# Patient Record
Sex: MALE | Race: WHITE | Employment: OTHER | URBAN - METROPOLITAN AREA
[De-identification: names, ages, dates, MRNs, and addresses within clinical notes are randomized per-mention and may not be internally consistent; named-entity substitution may affect disease eponyms.]

---

## 2018-08-28 ENCOUNTER — APPOINTMENT (OUTPATIENT)
Dept: GENERAL RADIOLOGY | Age: 68
End: 2018-08-28
Payer: MEDICARE

## 2018-08-28 ENCOUNTER — APPOINTMENT (OUTPATIENT)
Dept: CT IMAGING | Age: 68
End: 2018-08-28
Payer: MEDICARE

## 2018-08-28 ENCOUNTER — HOSPITAL ENCOUNTER (EMERGENCY)
Age: 68
Discharge: HOME | End: 2018-08-28
Attending: EMERGENCY MEDICINE
Payer: MEDICARE

## 2018-08-28 VITALS
HEART RATE: 74 BPM | BODY MASS INDEX: 26.6 KG/M2 | OXYGEN SATURATION: 97 % | TEMPERATURE: 98.1 F | WEIGHT: 190 LBS | HEIGHT: 71 IN | RESPIRATION RATE: 16 BRPM | DIASTOLIC BLOOD PRESSURE: 78 MMHG | SYSTOLIC BLOOD PRESSURE: 148 MMHG

## 2018-08-28 DIAGNOSIS — F12.10 MARIJUANA ABUSE: ICD-10-CM

## 2018-08-28 DIAGNOSIS — R42 DIZZINESS AND GIDDINESS: Primary | ICD-10-CM

## 2018-08-28 LAB
ACETAMINOPHEN LEVEL: <5 MCG/ML (ref 10–30)
ALBUMIN SERPL-MCNC: 4.7 G/DL (ref 3.5–5.2)
ALP BLD-CCNC: 122 U/L (ref 40–129)
ALT SERPL-CCNC: 11 U/L (ref 0–40)
AMPHETAMINE SCREEN, URINE: NOT DETECTED
ANION GAP SERPL CALCULATED.3IONS-SCNC: 18 MMOL/L (ref 7–16)
APTT: 31.5 SEC (ref 24.5–35.1)
AST SERPL-CCNC: 14 U/L (ref 0–39)
B.E.: 0.1 MMOL/L (ref -3–3)
BACTERIA: ABNORMAL /HPF
BARBITURATE SCREEN URINE: NOT DETECTED
BASOPHILS ABSOLUTE: 0.09 E9/L (ref 0–0.2)
BASOPHILS RELATIVE PERCENT: 0.9 % (ref 0–2)
BENZODIAZEPINE SCREEN, URINE: NOT DETECTED
BILIRUB SERPL-MCNC: 0.6 MG/DL (ref 0–1.2)
BILIRUBIN URINE: NEGATIVE
BLOOD, URINE: ABNORMAL
BUN BLDV-MCNC: 11 MG/DL (ref 8–23)
CALCIUM SERPL-MCNC: 9.5 MG/DL (ref 8.6–10.2)
CANNABINOID SCREEN URINE: POSITIVE
CHLORIDE BLD-SCNC: 100 MMOL/L (ref 98–107)
CHP ED QC CHECK: YES
CLARITY: CLEAR
CO2: 22 MMOL/L (ref 22–29)
COCAINE METABOLITE SCREEN URINE: NOT DETECTED
COHB: 2.1 % (ref 0–1.5)
COLOR: YELLOW
CREAT SERPL-MCNC: 0.9 MG/DL (ref 0.7–1.2)
CRITICAL: ABNORMAL
DATE ANALYZED: ABNORMAL
DATE OF COLLECTION: ABNORMAL
EKG ATRIAL RATE: 127 BPM
EKG P AXIS: 70 DEGREES
EKG P-R INTERVAL: 162 MS
EKG Q-T INTERVAL: 308 MS
EKG QRS DURATION: 96 MS
EKG QTC CALCULATION (BAZETT): 447 MS
EKG R AXIS: -88 DEGREES
EKG T AXIS: 65 DEGREES
EKG VENTRICULAR RATE: 127 BPM
EOSINOPHILS ABSOLUTE: 0.37 E9/L (ref 0.05–0.5)
EOSINOPHILS RELATIVE PERCENT: 3.6 % (ref 0–6)
ETHANOL: <10 MG/DL (ref 0–0.08)
GFR AFRICAN AMERICAN: >60
GFR NON-AFRICAN AMERICAN: >60 ML/MIN/1.73
GLUCOSE BLD-MCNC: 171 MG/DL
GLUCOSE BLD-MCNC: 174 MG/DL (ref 74–109)
GLUCOSE URINE: NEGATIVE MG/DL
HCO3: 21.5 MMOL/L (ref 22–26)
HCT VFR BLD CALC: 48.6 % (ref 37–54)
HEMOGLOBIN: 16.7 G/DL (ref 12.5–16.5)
HHB: 1.5 % (ref 0–5)
IMMATURE GRANULOCYTES #: 0.06 E9/L
IMMATURE GRANULOCYTES %: 0.6 % (ref 0–5)
INR BLD: 1
KETONES, URINE: NEGATIVE MG/DL
LAB: ABNORMAL
LACTIC ACID: 2.6 MMOL/L (ref 0.5–2.2)
LEUKOCYTE ESTERASE, URINE: ABNORMAL
LYMPHOCYTES ABSOLUTE: 2.59 E9/L (ref 1.5–4)
LYMPHOCYTES RELATIVE PERCENT: 25.4 % (ref 20–42)
Lab: 845
MCH RBC QN AUTO: 31.3 PG (ref 26–35)
MCHC RBC AUTO-ENTMCNC: 34.4 % (ref 32–34.5)
MCV RBC AUTO: 91 FL (ref 80–99.9)
METER GLUCOSE: 171 MG/DL (ref 70–110)
METHADONE SCREEN, URINE: NOT DETECTED
METHB: 0.2 % (ref 0–1.5)
MODE: ABNORMAL
MONOCYTES ABSOLUTE: 0.88 E9/L (ref 0.1–0.95)
MONOCYTES RELATIVE PERCENT: 8.6 % (ref 2–12)
NEUTROPHILS ABSOLUTE: 6.21 E9/L (ref 1.8–7.3)
NEUTROPHILS RELATIVE PERCENT: 60.9 % (ref 43–80)
NITRITE, URINE: NEGATIVE
O2 CONTENT: 22.7 ML/DL
O2 SATURATION: 98.5 % (ref 92–98.5)
O2HB: 96.2 % (ref 94–97)
OPERATOR ID: ABNORMAL
OPIATE SCREEN URINE: NOT DETECTED
PATIENT TEMP: 37 C
PCO2: 27.5 MMHG (ref 35–45)
PDW BLD-RTO: 14.3 FL (ref 11.5–15)
PH BLOOD GAS: 7.51 (ref 7.35–7.45)
PH UA: 6 (ref 5–9)
PHENCYCLIDINE SCREEN URINE: NOT DETECTED
PLATELET # BLD: 348 E9/L (ref 130–450)
PMV BLD AUTO: 10.7 FL (ref 7–12)
PO2: 128.8 MMHG (ref 60–100)
POTASSIUM SERPL-SCNC: 3.4 MMOL/L (ref 3.5–5)
PROPOXYPHENE SCREEN: NOT DETECTED
PROTEIN UA: NEGATIVE MG/DL
PROTHROMBIN TIME: 11.1 SEC (ref 9.3–12.4)
RBC # BLD: 5.34 E12/L (ref 3.8–5.8)
RBC UA: ABNORMAL /HPF (ref 0–2)
SALICYLATE, SERUM: <0.3 MG/DL (ref 0–30)
SODIUM BLD-SCNC: 140 MMOL/L (ref 132–146)
SOURCE, BLOOD GAS: ABNORMAL
SPECIFIC GRAVITY UA: <=1.005 (ref 1–1.03)
THB: 16.7 G/DL (ref 11.5–16.5)
TIME ANALYZED: 854
TOTAL PROTEIN: 8.2 G/DL (ref 6.4–8.3)
TRICYCLIC ANTIDEPRESSANTS SCREEN SERUM: NEGATIVE NG/ML
TROPONIN: <0.01 NG/ML (ref 0–0.03)
UROBILINOGEN, URINE: 0.2 E.U./DL
WBC # BLD: 10.2 E9/L (ref 4.5–11.5)
WBC UA: ABNORMAL /HPF (ref 0–5)

## 2018-08-28 PROCEDURE — 73080 X-RAY EXAM OF ELBOW: CPT

## 2018-08-28 PROCEDURE — 82962 GLUCOSE BLOOD TEST: CPT

## 2018-08-28 PROCEDURE — 6370000000 HC RX 637 (ALT 250 FOR IP): Performed by: EMERGENCY MEDICINE

## 2018-08-28 PROCEDURE — 2580000003 HC RX 258: Performed by: EMERGENCY MEDICINE

## 2018-08-28 PROCEDURE — 36415 COLL VENOUS BLD VENIPUNCTURE: CPT

## 2018-08-28 PROCEDURE — 84484 ASSAY OF TROPONIN QUANT: CPT

## 2018-08-28 PROCEDURE — 71045 X-RAY EXAM CHEST 1 VIEW: CPT

## 2018-08-28 PROCEDURE — 83605 ASSAY OF LACTIC ACID: CPT

## 2018-08-28 PROCEDURE — 81001 URINALYSIS AUTO W/SCOPE: CPT

## 2018-08-28 PROCEDURE — G0480 DRUG TEST DEF 1-7 CLASSES: HCPCS

## 2018-08-28 PROCEDURE — 82805 BLOOD GASES W/O2 SATURATION: CPT

## 2018-08-28 PROCEDURE — 85730 THROMBOPLASTIN TIME PARTIAL: CPT

## 2018-08-28 PROCEDURE — 80053 COMPREHEN METABOLIC PANEL: CPT

## 2018-08-28 PROCEDURE — 96365 THER/PROPH/DIAG IV INF INIT: CPT

## 2018-08-28 PROCEDURE — 70450 CT HEAD/BRAIN W/O DYE: CPT

## 2018-08-28 PROCEDURE — 87040 BLOOD CULTURE FOR BACTERIA: CPT

## 2018-08-28 PROCEDURE — 6360000002 HC RX W HCPCS: Performed by: EMERGENCY MEDICINE

## 2018-08-28 PROCEDURE — 85025 COMPLETE CBC W/AUTO DIFF WBC: CPT

## 2018-08-28 PROCEDURE — 99285 EMERGENCY DEPT VISIT HI MDM: CPT

## 2018-08-28 PROCEDURE — 93005 ELECTROCARDIOGRAM TRACING: CPT | Performed by: EMERGENCY MEDICINE

## 2018-08-28 PROCEDURE — 80307 DRUG TEST PRSMV CHEM ANLYZR: CPT

## 2018-08-28 PROCEDURE — 96366 THER/PROPH/DIAG IV INF ADDON: CPT

## 2018-08-28 PROCEDURE — 85610 PROTHROMBIN TIME: CPT

## 2018-08-28 RX ORDER — SODIUM CHLORIDE 0.9 % (FLUSH) 0.9 %
10 SYRINGE (ML) INJECTION EVERY 12 HOURS SCHEDULED
Status: DISCONTINUED | OUTPATIENT
Start: 2018-08-28 | End: 2018-08-28 | Stop reason: HOSPADM

## 2018-08-28 RX ORDER — DIAZEPAM 5 MG/1
5 TABLET ORAL EVERY 6 HOURS PRN
Qty: 16 TABLET | Refills: 0 | Status: ON HOLD | OUTPATIENT
Start: 2018-08-28 | End: 2018-08-30

## 2018-08-28 RX ORDER — DIAZEPAM 5 MG/1
10 TABLET ORAL ONCE
Status: COMPLETED | OUTPATIENT
Start: 2018-08-28 | End: 2018-08-28

## 2018-08-28 RX ORDER — VENLAFAXINE HYDROCHLORIDE 150 MG/1
150 CAPSULE, EXTENDED RELEASE ORAL DAILY
COMMUNITY
End: 2018-08-30 | Stop reason: SDUPTHER

## 2018-08-28 RX ORDER — ATORVASTATIN CALCIUM 10 MG/1
10 TABLET, FILM COATED ORAL DAILY
COMMUNITY
End: 2018-08-30 | Stop reason: SDUPTHER

## 2018-08-28 RX ORDER — AMLODIPINE BESYLATE 10 MG/1
10 TABLET ORAL DAILY
COMMUNITY
End: 2018-08-30 | Stop reason: SDUPTHER

## 2018-08-28 RX ORDER — OMEPRAZOLE 20 MG/1
20 CAPSULE, DELAYED RELEASE ORAL 2 TIMES DAILY
COMMUNITY
End: 2018-08-30 | Stop reason: SDUPTHER

## 2018-08-28 RX ORDER — POTASSIUM CHLORIDE 7.45 MG/ML
10 INJECTION INTRAVENOUS ONCE
Status: COMPLETED | OUTPATIENT
Start: 2018-08-28 | End: 2018-08-28

## 2018-08-28 RX ORDER — POLYETHYLENE GLYCOL 3350 17 G/17G
17 POWDER, FOR SOLUTION ORAL DAILY PRN
COMMUNITY
End: 2018-08-30

## 2018-08-28 RX ORDER — TAMSULOSIN HYDROCHLORIDE 0.4 MG/1
0.4 CAPSULE ORAL NIGHTLY
COMMUNITY
End: 2018-08-30 | Stop reason: SDUPTHER

## 2018-08-28 RX ORDER — SODIUM CHLORIDE 0.9 % (FLUSH) 0.9 %
10 SYRINGE (ML) INJECTION PRN
Status: DISCONTINUED | OUTPATIENT
Start: 2018-08-28 | End: 2018-08-28 | Stop reason: HOSPADM

## 2018-08-28 RX ADMIN — DIAZEPAM 10 MG: 5 TABLET ORAL at 12:22

## 2018-08-28 RX ADMIN — POTASSIUM CHLORIDE 10 MEQ: 10 INJECTION, SOLUTION INTRAVENOUS at 10:25

## 2018-08-28 RX ADMIN — Medication 10 ML: at 12:23

## 2018-08-28 ASSESSMENT — ENCOUNTER SYMPTOMS
EYE REDNESS: 0
SHORTNESS OF BREATH: 0
DIARRHEA: 0
SINUS PRESSURE: 0
WHEEZING: 0
VOMITING: 0
COUGH: 0
ABDOMINAL PAIN: 0
EYE DISCHARGE: 0
NAUSEA: 0
EYE PAIN: 0
BLOOD IN STOOL: 0
SORE THROAT: 0
BACK PAIN: 0

## 2018-08-28 ASSESSMENT — PAIN SCALES - GENERAL: PAINLEVEL_OUTOF10: 10

## 2018-08-28 ASSESSMENT — PAIN DESCRIPTION - LOCATION: LOCATION: HEAD

## 2018-08-28 ASSESSMENT — PAIN DESCRIPTION - PAIN TYPE: TYPE: ACUTE PAIN

## 2018-08-28 NOTE — ED PROVIDER NOTES
Normal rate, regular rhythm and normal heart sounds. Pulmonary/Chest: Effort normal and breath sounds normal. No respiratory distress. He has no wheezes. Abdominal: Soft. Bowel sounds are normal. He exhibits no distension. There is no tenderness. There is no rebound. Neurological: He is alert and oriented to person, place, and time. He has normal strength. No cranial nerve deficit or sensory deficit. He exhibits normal muscle tone. Coordination normal.   Patient is answering questions appropriately and responding with appropriate activity to questions however is very delayed and having trouble immediately responding to those commands. Skin: He is not diaphoretic. Procedures    MDM  --------------------------------------------- PAST HISTORY ---------------------------------------------  Past Medical History:  has a past medical history of Cerebral artery occlusion with cerebral infarction ([address] [address] Depression; Hyperlipidemia; and Hypertension. Past Surgical History:  has no past surgical history on file. Social History:  reports that he has never smoked. He has never used smokeless tobacco. He reports that he uses drugs, including Marijuana, about 7 times per week. He reports that he does not drink alcohol. Family History: family history is not on file. The patients home medications have been reviewed.     Allergies: Penicillins    -------------------------------------------------- RESULTS -------------------------------------------------  Labs:  Results for orders placed or performed during the hospital encounter of 08/28/18   CBC Auto Differential   Result Value Ref Range    WBC 10.2 4.5 - 11.5 E9/L    RBC 5.34 3.80 - 5.80 E12/L    Hemoglobin 16.7 (H) 12.5 - 16.5 g/dL    Hematocrit 48.6 37.0 - 54.0 %    MCV 91.0 80.0 - 99.9 fL    MCH 31.3 26.0 - 35.0 pg    MCHC 34.4 32.0 - 34.5 %    RDW 14.3 11.5 - 15.0 fL    Platelets 608 047 - 901 E9/L    MPV 10.7 7.0 - 12.0 fL    Neutrophils % 60.9 43.0 - 80.0 %    Immature Granulocytes % 0.6 0.0 - 5.0 %    Lymphocytes % 25.4 20.0 - 42.0 %    Monocytes % 8.6 2.0 - 12.0 %    Eosinophils % 3.6 0.0 - 6.0 %    Basophils % 0.9 0.0 - 2.0 %    Neutrophils # 6.21 1.80 - 7.30 E9/L    Immature Granulocytes # 0.06 E9/L    Lymphocytes # 2.59 1.50 - 4.00 E9/L    Monocytes # 0.88 0.10 - 0.95 E9/L    Eosinophils # 0.37 0.05 - 0.50 E9/L    Basophils # 0.09 0.00 - 0.20 E9/L   Comprehensive Metabolic Panel   Result Value Ref Range    Sodium 140 132 - 146 mmol/L    Potassium 3.4 (L) 3.5 - 5.0 mmol/L    Chloride 100 98 - 107 mmol/L    CO2 22 22 - 29 mmol/L    Anion Gap 18 (H) 7 - 16 mmol/L    Glucose 174 (H) 74 - 109 mg/dL    BUN 11 8 - 23 mg/dL    CREATININE 0.9 0.7 - 1.2 mg/dL    GFR Non-African American >60 >=60 mL/min/1.73    GFR African American >60     Calcium 9.5 8.6 - 10.2 mg/dL    Total Protein 8.2 6.4 - 8.3 g/dL    Alb 4.7 3.5 - 5.2 g/dL    Total Bilirubin 0.6 0.0 - 1.2 mg/dL    Alkaline Phosphatase 122 40 - 129 U/L    ALT 11 0 - 40 U/L    AST 14 0 - 39 U/L   Troponin   Result Value Ref Range    Troponin <0.01 0.00 - 0.03 ng/mL   Lactic Acid, Plasma   Result Value Ref Range    Lactic Acid 2.6 (H) 0.5 - 2.2 mmol/L   Urinalysis   Result Value Ref Range    Color, UA Yellow Straw/Yellow    Clarity, UA Clear Clear    Glucose, Ur Negative Negative mg/dL    Bilirubin Urine Negative Negative    Ketones, Urine Negative Negative mg/dL    Specific Gravity, UA <=1.005 1.005 - 1.030    Blood, Urine TRACE-INTACT Negative    pH, UA 6.0 5.0 - 9.0    Protein, UA Negative Negative mg/dL    Urobilinogen, Urine 0.2 <2.0 E.U./dL    Nitrite, Urine Negative Negative    Leukocyte Esterase, Urine TRACE (A) Negative   Urine Drug Screen   Result Value Ref Range    Amphetamine Screen, Urine NOT DETECTED Negative <1000 ng/mL    Barbiturate Screen, Ur NOT DETECTED Negative < 200 ng/mL    Benzodiazepine Screen, Urine NOT DETECTED Negative < 200 ng/mL    Cannabinoid Scrn, Ur POSITIVE (A) Negative < 50ng/mL No gross focal consolidation. CT Head WO Contrast   Final Result   1. No evidence of intracranial hemorrhage, extra axial collection,   space-occupying mass lesion or mass effect, midline shift, or acute   territorial infarction. If there is strong clinical suspicion for   acute infarct of the brain, then MR imaging of the brain with   diffusion-weighted sequence may be obtained for further evaluation. 2. Chronic involutional changes and minimal microvascular ischemic   disease as described above.          ------------------------- NURSING NOTES AND VITALS REVIEWED ---------------------------  Date / Time Roomed:  8/28/2018  8:34 AM  ED Bed Assignment:  21/21    The nursing notes within the ED encounter and vital signs as below have been reviewed. BP (!) 164/89   Pulse 70   Temp 98.1 °F (36.7 °C) (Oral)   Resp 18   Ht 5' 11\" (1.803 m)   Wt 190 lb (86.2 kg)   SpO2 97%   BMI 26.50 kg/m²   Oxygen Saturation Interpretation: Normal      ------------------------------------------ PROGRESS NOTES ------------------------------------------  Patient seen and examined the patient does appear altered in his mental status he is complaining of dizziness he is answering questions appropriately but is very delayed in his responses to my commands. Labs and imaging for concern for stroke or brain bleed are ordered neurologically EKG chest x-ray and other drug screens have been ordered as well. Imaging and labs are reviewed because we have not found a clear source for this patient's altered mental status I have suggested a lumbar puncture to the patient. Patient is much more alert and oriented this time is answering all questions appropriately and states he does not want to have a lumbar puncture performed. Patient is complaining of significant dizziness at this time I've given the patient a 10 mg value to see how he does.     Patient reevaluated and is able to ambulate without any difficulty he is given shows signs of improvement of his mental status at this point I feel comfortable discharging the patient. I have discussed the patient's use of marijuana may have some implications in his symptoms today. He does state that he goes his own marijuana and homes is not using any synthetics or other disorders for his marijuana. He asked that he follow up with primary care and would like to be connected with a psychiatrist for his psychiatric meds that he uses. The patient is safe for discharge and he has told me that he will return if symptoms do change and he would be willing to lumbar puncture if he is having these symptoms again. I again suggested that he get a lumbar puncture to get answers today and he refuses. Patient is discharged with short course of Valium. I have spoken with the patient and discussed todays results, in addition to providing specific details for the plan of care and counseling regarding the diagnosis and prognosis. Their questions are answered at this time and they are agreeable with the plan. I discussed at length with them reasons for immediate return here for re evaluation. They will followup with their psychiatrist and primary care physician by calling their office tomorrow. --------------------------------- ADDITIONAL PROVIDER NOTES ---------------------------------  At this time the patient is without objective evidence of an acute process requiring hospitalization or inpatient management. They have remained hemodynamically stable throughout their entire ED visit and are stable for discharge with outpatient follow-up. The plan has been discussed in detail and they are aware of the specific conditions for emergent return, as well as the importance of follow-up. New Prescriptions    DIAZEPAM (VALIUM) 5 MG TABLET    Take 1 tablet by mouth every 6 hours as needed for Anxiety for up to 10 days. .       Diagnosis:  1. Dizziness and giddiness    2.  Marijuana abuse

## 2018-08-28 NOTE — ED NOTES
Pt refused lumbar puncture, discussed with Dr. Soniya Calderon.       Monica Alonzo, RN  08/28/18 4934

## 2018-08-28 NOTE — ED NOTES
RN called CT. They will come get patient when CT machine is booted properly. Lobito Camargo.  [facility]  08/28/18 9708

## 2018-08-28 NOTE — ED NOTES
Pt ambulated down love and back, complaining of headache, steady on feet with cane.      Ciara Shah, SCOTT  08/28/18 5826

## 2018-08-30 ENCOUNTER — HOSPITAL ENCOUNTER (INPATIENT)
Age: 68
LOS: 6 days | Discharge: PSYCHIATRIC OR SUBSTANCE USE FACILITY | DRG: 103 | End: 2018-09-05
Attending: INTERNAL MEDICINE | Admitting: INTERNAL MEDICINE
Payer: MEDICARE

## 2018-08-30 ENCOUNTER — OFFICE VISIT (OUTPATIENT)
Dept: FAMILY MEDICINE CLINIC | Age: 68
End: 2018-08-30
Payer: MEDICARE

## 2018-08-30 VITALS
BODY MASS INDEX: 26.05 KG/M2 | SYSTOLIC BLOOD PRESSURE: 142 MMHG | OXYGEN SATURATION: 97 % | DIASTOLIC BLOOD PRESSURE: 82 MMHG | WEIGHT: 182 LBS | HEIGHT: 70 IN | HEART RATE: 76 BPM | RESPIRATION RATE: 18 BRPM

## 2018-08-30 DIAGNOSIS — Z91.81 AT HIGH RISK FOR INJURY RELATED TO FALL: ICD-10-CM

## 2018-08-30 DIAGNOSIS — R35.0 FREQUENCY OF URINATION: ICD-10-CM

## 2018-08-30 DIAGNOSIS — K21.9 GASTROESOPHAGEAL REFLUX DISEASE WITHOUT ESOPHAGITIS: ICD-10-CM

## 2018-08-30 DIAGNOSIS — R27.0 ATAXIA: Primary | ICD-10-CM

## 2018-08-30 DIAGNOSIS — H53.8 BLURRED VISION: ICD-10-CM

## 2018-08-30 DIAGNOSIS — F32.0 CURRENT MILD EPISODE OF MAJOR DEPRESSIVE DISORDER, UNSPECIFIED WHETHER RECURRENT (HCC): ICD-10-CM

## 2018-08-30 DIAGNOSIS — E78.2 MIXED HYPERLIPIDEMIA: ICD-10-CM

## 2018-08-30 DIAGNOSIS — F32.9 MAJOR DEPRESSION, CHRONIC: ICD-10-CM

## 2018-08-30 DIAGNOSIS — R73.09 ABNORMAL BLOOD SUGAR: ICD-10-CM

## 2018-08-30 DIAGNOSIS — I10 ESSENTIAL (PRIMARY) HYPERTENSION: ICD-10-CM

## 2018-08-30 DIAGNOSIS — Z87.828 HISTORY OF TRAUMATIC HEAD INJURY: ICD-10-CM

## 2018-08-30 PROBLEM — E78.5 HYPERLIPIDEMIA: Chronic | Status: ACTIVE | Noted: 2018-08-30

## 2018-08-30 PROBLEM — G44.309 POST-TRAUMATIC HEADACHE: Status: ACTIVE | Noted: 2018-08-30

## 2018-08-30 PROBLEM — E78.5 HYPERLIPIDEMIA: Status: ACTIVE | Noted: 2018-08-30

## 2018-08-30 LAB
GLUCOSE BLD-MCNC: 155 MG/DL
HBA1C MFR BLD: 6.9 %
METER GLUCOSE: 132 MG/DL (ref 70–110)

## 2018-08-30 PROCEDURE — 99204 OFFICE O/P NEW MOD 45 MIN: CPT | Performed by: STUDENT IN AN ORGANIZED HEALTH CARE EDUCATION/TRAINING PROGRAM

## 2018-08-30 PROCEDURE — 1123F ACP DISCUSS/DSCN MKR DOCD: CPT | Performed by: STUDENT IN AN ORGANIZED HEALTH CARE EDUCATION/TRAINING PROGRAM

## 2018-08-30 PROCEDURE — 6360000002 HC RX W HCPCS: Performed by: INTERNAL MEDICINE

## 2018-08-30 PROCEDURE — 1036F TOBACCO NON-USER: CPT | Performed by: STUDENT IN AN ORGANIZED HEALTH CARE EDUCATION/TRAINING PROGRAM

## 2018-08-30 PROCEDURE — G8427 DOCREV CUR MEDS BY ELIG CLIN: HCPCS | Performed by: STUDENT IN AN ORGANIZED HEALTH CARE EDUCATION/TRAINING PROGRAM

## 2018-08-30 PROCEDURE — 2060000000 HC ICU INTERMEDIATE R&B

## 2018-08-30 PROCEDURE — G8419 CALC BMI OUT NRM PARAM NOF/U: HCPCS | Performed by: STUDENT IN AN ORGANIZED HEALTH CARE EDUCATION/TRAINING PROGRAM

## 2018-08-30 PROCEDURE — 82962 GLUCOSE BLOOD TEST: CPT | Performed by: STUDENT IN AN ORGANIZED HEALTH CARE EDUCATION/TRAINING PROGRAM

## 2018-08-30 PROCEDURE — 6370000000 HC RX 637 (ALT 250 FOR IP): Performed by: INTERNAL MEDICINE

## 2018-08-30 PROCEDURE — 4040F PNEUMOC VAC/ADMIN/RCVD: CPT | Performed by: STUDENT IN AN ORGANIZED HEALTH CARE EDUCATION/TRAINING PROGRAM

## 2018-08-30 PROCEDURE — G8598 ASA/ANTIPLAT THER USED: HCPCS | Performed by: STUDENT IN AN ORGANIZED HEALTH CARE EDUCATION/TRAINING PROGRAM

## 2018-08-30 PROCEDURE — 3017F COLORECTAL CA SCREEN DOC REV: CPT | Performed by: STUDENT IN AN ORGANIZED HEALTH CARE EDUCATION/TRAINING PROGRAM

## 2018-08-30 PROCEDURE — 6370000000 HC RX 637 (ALT 250 FOR IP): Performed by: HOSPITALIST

## 2018-08-30 PROCEDURE — 83036 HEMOGLOBIN GLYCOSYLATED A1C: CPT | Performed by: STUDENT IN AN ORGANIZED HEALTH CARE EDUCATION/TRAINING PROGRAM

## 2018-08-30 PROCEDURE — 1101F PT FALLS ASSESS-DOCD LE1/YR: CPT | Performed by: STUDENT IN AN ORGANIZED HEALTH CARE EDUCATION/TRAINING PROGRAM

## 2018-08-30 PROCEDURE — G0444 DEPRESSION SCREEN ANNUAL: HCPCS | Performed by: STUDENT IN AN ORGANIZED HEALTH CARE EDUCATION/TRAINING PROGRAM

## 2018-08-30 PROCEDURE — 82962 GLUCOSE BLOOD TEST: CPT

## 2018-08-30 RX ORDER — MECLIZINE HCL 12.5 MG/1
25 TABLET ORAL 3 TIMES DAILY PRN
Status: DISCONTINUED | OUTPATIENT
Start: 2018-08-30 | End: 2018-09-05 | Stop reason: HOSPADM

## 2018-08-30 RX ORDER — AMLODIPINE BESYLATE 10 MG/1
10 TABLET ORAL DAILY
Qty: 90 TABLET | Refills: 0 | Status: SHIPPED | OUTPATIENT
Start: 2018-08-30 | End: 2018-10-30 | Stop reason: SDUPTHER

## 2018-08-30 RX ORDER — ONDANSETRON 2 MG/ML
4 INJECTION INTRAMUSCULAR; INTRAVENOUS EVERY 6 HOURS PRN
Status: DISCONTINUED | OUTPATIENT
Start: 2018-08-30 | End: 2018-09-05 | Stop reason: HOSPADM

## 2018-08-30 RX ORDER — OMEPRAZOLE 20 MG/1
20 CAPSULE, DELAYED RELEASE ORAL 2 TIMES DAILY
Qty: 180 CAPSULE | Refills: 0 | Status: SHIPPED | OUTPATIENT
Start: 2018-08-30 | End: 2018-10-30 | Stop reason: SDUPTHER

## 2018-08-30 RX ORDER — PANTOPRAZOLE SODIUM 40 MG/1
40 TABLET, DELAYED RELEASE ORAL
Status: DISCONTINUED | OUTPATIENT
Start: 2018-08-31 | End: 2018-09-05 | Stop reason: HOSPADM

## 2018-08-30 RX ORDER — ATORVASTATIN CALCIUM 40 MG/1
40 TABLET, FILM COATED ORAL DAILY
Status: DISCONTINUED | OUTPATIENT
Start: 2018-08-30 | End: 2018-09-05 | Stop reason: HOSPADM

## 2018-08-30 RX ORDER — ASPIRIN 81 MG/1
81 TABLET ORAL DAILY
Status: DISCONTINUED | OUTPATIENT
Start: 2018-08-30 | End: 2018-09-05 | Stop reason: HOSPADM

## 2018-08-30 RX ORDER — NICOTINE POLACRILEX 4 MG
15 LOZENGE BUCCAL PRN
Status: DISCONTINUED | OUTPATIENT
Start: 2018-08-30 | End: 2018-09-05 | Stop reason: HOSPADM

## 2018-08-30 RX ORDER — VENLAFAXINE HYDROCHLORIDE 150 MG/1
150 CAPSULE, EXTENDED RELEASE ORAL DAILY
Qty: 90 CAPSULE | Refills: 0 | Status: ON HOLD | OUTPATIENT
Start: 2018-08-30 | End: 2018-09-08 | Stop reason: HOSPADM

## 2018-08-30 RX ORDER — ATORVASTATIN CALCIUM 10 MG/1
10 TABLET, FILM COATED ORAL DAILY
Qty: 90 TABLET | Refills: 0 | Status: SHIPPED | OUTPATIENT
Start: 2018-08-30 | End: 2018-10-30 | Stop reason: SDUPTHER

## 2018-08-30 RX ORDER — VENLAFAXINE HYDROCHLORIDE 150 MG/1
150 CAPSULE, EXTENDED RELEASE ORAL DAILY
Status: DISCONTINUED | OUTPATIENT
Start: 2018-08-30 | End: 2018-09-05 | Stop reason: HOSPADM

## 2018-08-30 RX ORDER — POLYETHYLENE GLYCOL 3350 17 G/17G
17 POWDER, FOR SOLUTION ORAL DAILY
COMMUNITY
Start: 2018-07-15

## 2018-08-30 RX ORDER — AMLODIPINE BESYLATE 10 MG/1
10 TABLET ORAL DAILY
Status: DISCONTINUED | OUTPATIENT
Start: 2018-08-30 | End: 2018-09-05 | Stop reason: HOSPADM

## 2018-08-30 RX ORDER — TAMSULOSIN HYDROCHLORIDE 0.4 MG/1
0.4 CAPSULE ORAL NIGHTLY
Qty: 90 CAPSULE | Refills: 0 | Status: SHIPPED | OUTPATIENT
Start: 2018-08-30 | End: 2018-10-30 | Stop reason: SDUPTHER

## 2018-08-30 RX ORDER — TAMSULOSIN HYDROCHLORIDE 0.4 MG/1
0.4 CAPSULE ORAL NIGHTLY
Status: DISCONTINUED | OUTPATIENT
Start: 2018-08-30 | End: 2018-09-05 | Stop reason: HOSPADM

## 2018-08-30 RX ORDER — DEXTROSE MONOHYDRATE 50 MG/ML
100 INJECTION, SOLUTION INTRAVENOUS PRN
Status: DISCONTINUED | OUTPATIENT
Start: 2018-08-30 | End: 2018-09-05 | Stop reason: HOSPADM

## 2018-08-30 RX ORDER — ACETAMINOPHEN 325 MG/1
650 TABLET ORAL EVERY 4 HOURS PRN
Status: DISCONTINUED | OUTPATIENT
Start: 2018-08-30 | End: 2018-09-05 | Stop reason: HOSPADM

## 2018-08-30 RX ORDER — DEXTROSE MONOHYDRATE 25 G/50ML
12.5 INJECTION, SOLUTION INTRAVENOUS PRN
Status: DISCONTINUED | OUTPATIENT
Start: 2018-08-30 | End: 2018-09-05 | Stop reason: HOSPADM

## 2018-08-30 RX ADMIN — VENLAFAXINE HYDROCHLORIDE 150 MG: 150 CAPSULE, EXTENDED RELEASE ORAL at 20:47

## 2018-08-30 RX ADMIN — TAMSULOSIN HYDROCHLORIDE 0.4 MG: 0.4 CAPSULE ORAL at 20:47

## 2018-08-30 RX ADMIN — ENOXAPARIN SODIUM 40 MG: 40 INJECTION SUBCUTANEOUS at 20:47

## 2018-08-30 RX ADMIN — ONDANSETRON 4 MG: 2 SOLUTION INTRAMUSCULAR; INTRAVENOUS at 19:56

## 2018-08-30 RX ADMIN — MECLIZINE HCL 12.5 MG 25 MG: 12.5 TABLET ORAL at 20:09

## 2018-08-30 RX ADMIN — ACETAMINOPHEN 650 MG: 325 TABLET, FILM COATED ORAL at 20:47

## 2018-08-30 ASSESSMENT — PAIN DESCRIPTION - PAIN TYPE: TYPE: ACUTE PAIN

## 2018-08-30 ASSESSMENT — ENCOUNTER SYMPTOMS
SINUS PAIN: 0
SHORTNESS OF BREATH: 1
NAUSEA: 0
SPUTUM PRODUCTION: 1
VOMITING: 0
ABDOMINAL PAIN: 1
COUGH: 1
HEMOPTYSIS: 0
BLURRED VISION: 1
CONSTIPATION: 1
BACK PAIN: 0
DOUBLE VISION: 1
HEARTBURN: 1
PHOTOPHOBIA: 1

## 2018-08-30 ASSESSMENT — PATIENT HEALTH QUESTIONNAIRE - PHQ9
10. IF YOU CHECKED OFF ANY PROBLEMS, HOW DIFFICULT HAVE THESE PROBLEMS MADE IT FOR YOU TO DO YOUR WORK, TAKE CARE OF THINGS AT HOME, OR GET ALONG WITH OTHER PEOPLE: 3
8. MOVING OR SPEAKING SO SLOWLY THAT OTHER PEOPLE COULD HAVE NOTICED. OR THE OPPOSITE, BEING SO FIGETY OR RESTLESS THAT YOU HAVE BEEN MOVING AROUND A LOT MORE THAN USUAL: 3
2. FEELING DOWN, DEPRESSED OR HOPELESS: 3
9. THOUGHTS THAT YOU WOULD BE BETTER OFF DEAD, OR OF HURTING YOURSELF: 3
5. POOR APPETITE OR OVEREATING: 3
SUM OF ALL RESPONSES TO PHQ QUESTIONS 1-9: 27
3. TROUBLE FALLING OR STAYING ASLEEP: 3
1. LITTLE INTEREST OR PLEASURE IN DOING THINGS: 3
7. TROUBLE CONCENTRATING ON THINGS, SUCH AS READING THE NEWSPAPER OR WATCHING TELEVISION: 3
4. FEELING TIRED OR HAVING LITTLE ENERGY: 3
6. FEELING BAD ABOUT YOURSELF - OR THAT YOU ARE A FAILURE OR HAVE LET YOURSELF OR YOUR FAMILY DOWN: 3
SUM OF ALL RESPONSES TO PHQ9 QUESTIONS 1 & 2: 6
SUM OF ALL RESPONSES TO PHQ QUESTIONS 1-9: 27

## 2018-08-30 ASSESSMENT — PAIN DESCRIPTION - LOCATION: LOCATION: HEAD

## 2018-08-30 ASSESSMENT — PAIN SCALES - GENERAL
PAINLEVEL_OUTOF10: 4
PAINLEVEL_OUTOF10: 0
PAINLEVEL_OUTOF10: 0

## 2018-08-30 ASSESSMENT — PAIN DESCRIPTION - DESCRIPTORS: DESCRIPTORS: DULL;HEADACHE

## 2018-08-30 ASSESSMENT — PAIN DESCRIPTION - PROGRESSION: CLINICAL_PROGRESSION: GRADUALLY WORSENING

## 2018-08-30 ASSESSMENT — PAIN DESCRIPTION - ONSET: ONSET: ON-GOING

## 2018-08-30 ASSESSMENT — PAIN DESCRIPTION - FREQUENCY: FREQUENCY: CONTINUOUS

## 2018-08-30 NOTE — PROGRESS NOTES
[facility] (formerly [facility])  Department of Family Medicine  Family Medicine Residency Program      Patient:  Shiv Smith 80 y.o. male     Date of Service: 8/30/18      Chief complaint:   Chief Complaint   Patient presents with    New Patient    Depression     patient has been out of his medication for 5 days    Hypertension    Dizziness    Headache    Numbness     tingling sensation all over his body, started when the dizziness started and he falls         History of Present Illness   The patient is a 80 y.o. male  presented to the clinic with complaints as above. Hasn't seen a doctor in a year. Was seeing a doctor Tate Basurto in [facility]. Moved here one year ago. Patient is tearful. Wife is providing history because patient is not responding very well. She states he has headaches all the time. Around the 9th of this month he was going to load something in a [employer] and he hit his head on the [place] [employer]. Has had headaches ever since. Patient is also complaining of being dizzy and off-balance. Falls multiple times per week. Admits to hitting his head on a binder in 2000. States \"all this started then\". Has been consistently dizzy since the 9th when he hit his head. Has had multiple falls and does not know why he is falling. He describes his dizziness as the room spinning and it is present when he is laying down, seated or walking. Has intermittent episodes when he feels better. Went to the ED 8/28 for dizziness and altered mental status. Found to have respiratory alkalosis, mildly elevated lactate. They recommended LP but he declined. CT head was negative. No MRI. Discharged. Depression over the last week because he ran out of venlafaxine. No suicide plan but does feel if he wasn't here he'd be better off. He has stomach aches all the time. Wife thinks he has diverticulosis. Has chronic cough. It is productive. Both for at least a year. No COPD dx.  Sometimes short of breath according to his Positive for falls. Negative for back pain and neck pain. Neurological: Positive for dizziness, tingling, sensory change, focal weakness, loss of consciousness, weakness and headaches. Endo/Heme/Allergies: Negative for environmental allergies. Does not bruise/bleed easily. Psychiatric/Behavioral: Positive for depression, memory loss and substance abuse. Negative for suicidal ideas. The patient is nervous/anxious and has insomnia. Physical Exam   Vitals: BP (!) 142/82   Pulse 76   Resp 18   Ht 5' 10\" (1.778 m)   Wt 182 lb (82.6 kg)   SpO2 97%   BMI 26.11 kg/m²   General Appearance: Well developed, somnolent, oriented to person place and time. HEENT: Normocephalic, atraumatic. Left eye blown pupil. Neck: Supple, symmetrical, trachea midline. No JVD. Chest wall/Lung: Clear to auscultation bilaterally,  respirations unlabored. No ronchi/wheezing/rales  Heart[de-identified]  Irregular rate and rhythm, S1 and S2 normal, no murmur, rub or gallop. Abdomen: Soft, Diffuse abdominal pain to palpation, bowel sounds normoactive, no masses, no organomegaly. umbilical hernia. Extremities:  Extremities normal, atraumatic, no cyanosis. no edema. Skin: Skin color, texture, turgor normal, no rashes or lesions  Lymph nodes: no lymph node enlargement appreciated  Neurologic:    Ataxic gait  Blurry vision requiring patient to tilt head to see person he is talking to. Slurred speech. Difficulty looking straight ahead. Tingling throughout body. Psychiatric: depressed mood, tearful. Assessment and Plan     Angel Luis Perez was seen today for new patient, depression, hypertension, dizziness, headache and numbness. Diagnoses and all orders for this visit:    Ataxia: multiple falls at home. Worse this summer particularly since he hit his head on the 9th. Mostly due to dizziness and blurry vision. Has not seen neurology as far as patient and his wife know.     Patient admitted downtown for further assessment    At high complications,  especially if left uncontrolled. Counseled regarding the possible side effects, risks, benefits and alternatives to treatment; patient and/or guardian verbalizes understanding, agrees, feels comfortable with and wishes to proceed with above treatment plan. Call or go to ED immediately if symptoms worsen or persist. Advised patient to call with any new medication issues, and, as applicable, read all Rx info from pharmacy to assure aware of all possible risks and side effects of medication before taking. Patient and/or guardian given opportunity to ask questions/raise concerns. The patient verbalized comfort and understanding of instructions. I encourage further reading and education about your health conditions. Information on many health conditions is provided by the American Academy of Family Physicians: https://familydoctor. org/  Please bring any questions to me at your next visit. Return to Office: Return in about 2 weeks (around 9/13/2018). Medication List:    Current Outpatient Prescriptions   Medication Sig Dispense Refill    polyethylene glycol (GLYCOLAX) powder Take 17 g by mouth daily       amLODIPine (NORVASC) 10 MG tablet Take 1 tablet by mouth daily 90 tablet 0    atorvastatin (LIPITOR) 10 MG tablet Take 1 tablet by mouth daily 90 tablet 0    omeprazole (PRILOSEC) 20 MG delayed release capsule Take 1 capsule by mouth 2 times daily 180 capsule 0    tamsulosin (FLOMAX) 0.4 MG capsule Take 1 capsule by mouth nightly 90 capsule 0    venlafaxine (EFFEXOR XR) 150 MG extended release capsule Take 1 capsule by mouth daily 90 capsule 0    aspirin 81 MG tablet Take 81 mg by mouth daily      diazepam (VALIUM) 5 MG tablet Take 1 tablet by mouth every 6 hours as needed for Anxiety for up to 10 days. . 16 tablet 0     No current facility-administered medications for this visit.          Catalina Mckeon, DO       This document may have been prepared at least partially through the

## 2018-08-31 ENCOUNTER — APPOINTMENT (OUTPATIENT)
Dept: CT IMAGING | Age: 68
DRG: 103 | End: 2018-08-31
Attending: INTERNAL MEDICINE
Payer: MEDICARE

## 2018-08-31 ENCOUNTER — APPOINTMENT (OUTPATIENT)
Dept: MRI IMAGING | Age: 68
DRG: 103 | End: 2018-08-31
Attending: INTERNAL MEDICINE
Payer: MEDICARE

## 2018-08-31 PROBLEM — I65.23 BILATERAL CAROTID ARTERY STENOSIS: Chronic | Status: ACTIVE | Noted: 2018-08-31

## 2018-08-31 LAB
ANION GAP SERPL CALCULATED.3IONS-SCNC: 13 MMOL/L (ref 7–16)
BUN BLDV-MCNC: 15 MG/DL (ref 8–23)
CALCIUM SERPL-MCNC: 9 MG/DL (ref 8.6–10.2)
CHLORIDE BLD-SCNC: 102 MMOL/L (ref 98–107)
CHOLESTEROL, TOTAL: 161 MG/DL (ref 0–199)
CO2: 27 MMOL/L (ref 22–29)
CREAT SERPL-MCNC: 0.9 MG/DL (ref 0.7–1.2)
GFR AFRICAN AMERICAN: >60
GFR NON-AFRICAN AMERICAN: >60 ML/MIN/1.73
GLUCOSE BLD-MCNC: 117 MG/DL (ref 74–109)
HDLC SERPL-MCNC: 63 MG/DL
LDL CHOLESTEROL CALCULATED: 76 MG/DL (ref 0–99)
LV EF: 65 %
LVEF MODALITY: NORMAL
METER GLUCOSE: 108 MG/DL (ref 70–110)
METER GLUCOSE: 115 MG/DL (ref 70–110)
METER GLUCOSE: 137 MG/DL (ref 70–110)
METER GLUCOSE: 91 MG/DL (ref 70–110)
POTASSIUM SERPL-SCNC: 4.4 MMOL/L (ref 3.5–5)
SODIUM BLD-SCNC: 142 MMOL/L (ref 132–146)
TRIGL SERPL-MCNC: 111 MG/DL (ref 0–149)
VLDLC SERPL CALC-MCNC: 22 MG/DL

## 2018-08-31 PROCEDURE — 82962 GLUCOSE BLOOD TEST: CPT

## 2018-08-31 PROCEDURE — 6360000004 HC RX CONTRAST MEDICATION: Performed by: RADIOLOGY

## 2018-08-31 PROCEDURE — 6360000002 HC RX W HCPCS: Performed by: INTERNAL MEDICINE

## 2018-08-31 PROCEDURE — 2060000000 HC ICU INTERMEDIATE R&B

## 2018-08-31 PROCEDURE — 80061 LIPID PANEL: CPT

## 2018-08-31 PROCEDURE — 6370000000 HC RX 637 (ALT 250 FOR IP): Performed by: INTERNAL MEDICINE

## 2018-08-31 PROCEDURE — 70551 MRI BRAIN STEM W/O DYE: CPT

## 2018-08-31 PROCEDURE — 70544 MR ANGIOGRAPHY HEAD W/O DYE: CPT

## 2018-08-31 PROCEDURE — 70498 CT ANGIOGRAPHY NECK: CPT

## 2018-08-31 PROCEDURE — 36415 COLL VENOUS BLD VENIPUNCTURE: CPT

## 2018-08-31 PROCEDURE — 99223 1ST HOSP IP/OBS HIGH 75: CPT | Performed by: SURGERY

## 2018-08-31 PROCEDURE — 80048 BASIC METABOLIC PNL TOTAL CA: CPT

## 2018-08-31 PROCEDURE — 93306 TTE W/DOPPLER COMPLETE: CPT

## 2018-08-31 PROCEDURE — 70496 CT ANGIOGRAPHY HEAD: CPT

## 2018-08-31 PROCEDURE — 6370000000 HC RX 637 (ALT 250 FOR IP): Performed by: HOSPITALIST

## 2018-08-31 RX ADMIN — IOPAMIDOL 60 ML: 755 INJECTION, SOLUTION INTRAVENOUS at 11:46

## 2018-08-31 RX ADMIN — ATORVASTATIN CALCIUM 40 MG: 40 TABLET, FILM COATED ORAL at 09:04

## 2018-08-31 RX ADMIN — ASPIRIN 81 MG: 81 TABLET ORAL at 09:04

## 2018-08-31 RX ADMIN — ENOXAPARIN SODIUM 40 MG: 40 INJECTION SUBCUTANEOUS at 09:03

## 2018-08-31 RX ADMIN — AMLODIPINE BESYLATE 10 MG: 10 TABLET ORAL at 09:04

## 2018-08-31 RX ADMIN — ACETAMINOPHEN 650 MG: 325 TABLET, FILM COATED ORAL at 12:47

## 2018-08-31 RX ADMIN — TAMSULOSIN HYDROCHLORIDE 0.4 MG: 0.4 CAPSULE ORAL at 20:18

## 2018-08-31 RX ADMIN — MECLIZINE HCL 12.5 MG 25 MG: 12.5 TABLET ORAL at 17:10

## 2018-08-31 RX ADMIN — VENLAFAXINE HYDROCHLORIDE 150 MG: 150 CAPSULE, EXTENDED RELEASE ORAL at 09:03

## 2018-08-31 RX ADMIN — PANTOPRAZOLE SODIUM 40 MG: 40 TABLET, DELAYED RELEASE ORAL at 12:47

## 2018-08-31 RX ADMIN — ACETAMINOPHEN 650 MG: 325 TABLET, FILM COATED ORAL at 20:17

## 2018-08-31 RX ADMIN — MECLIZINE HCL 12.5 MG 25 MG: 12.5 TABLET ORAL at 17:13

## 2018-08-31 RX ADMIN — MECLIZINE HCL 12.5 MG 25 MG: 12.5 TABLET ORAL at 12:48

## 2018-08-31 ASSESSMENT — PAIN SCALES - GENERAL
PAINLEVEL_OUTOF10: 0
PAINLEVEL_OUTOF10: 5
PAINLEVEL_OUTOF10: 0
PAINLEVEL_OUTOF10: 6

## 2018-08-31 ASSESSMENT — ENCOUNTER SYMPTOMS
NAUSEA: 1
BACK PAIN: 1
DOUBLE VISION: 0
RESPIRATORY NEGATIVE: 1

## 2018-08-31 ASSESSMENT — PAIN DESCRIPTION - PAIN TYPE: TYPE: ACUTE PAIN

## 2018-08-31 NOTE — PROGRESS NOTES
Physical Therapy    Facility/Department: [address] NEURO [facility]MC[facility]      NAME: Alyssa Sweet  : 1950  MRN: [id number]    Date of Service: 2018    PT order received. Chart reviewed. Evaluation attempted x 2 this am but pt off the floor for testing. Will check later.        Osito Tavarez PT, DPT [id number]

## 2018-08-31 NOTE — CONSULTS
pantoprazole  40 mg Oral QAM AC    enoxaparin  40 mg Subcutaneous Daily        Allergies:  Penicillins    Social History     Social History    Marital status:      Spouse name: N/A    Number of children: N/A    Years of education: N/A     Occupational History    Not on file.      Social History Main Topics    Smoking status: Never Smoker    Smokeless tobacco: Never Used    Alcohol use No    Drug use: Yes     Frequency: 7.0 times per week     Types: Marijuana    Sexual activity: No     Other Topics Concern    Not on file     Social History Narrative    No narrative on file        Family History   Problem Relation Age of Onset    Cancer Mother     No Known Problems Father     Diabetes Sister     Diabetes Brother     Diabetes Paternal Grandmother     Cancer Paternal Grandfather        REVIEW OF SYSTEMS:      Eyes:      Blurred vision:  No []/Yes [x]               Diplopia:   No [x]/Yes []               Vision loss:       No [x]/Yes []   Ears, nose, throat:             Hearing loss:    No [x]/Yes []      Vertigo:   No []/Yes [x]                       Swallowing problem:  No [x]/Yes []               Nose bleeds:   No [x]/Yes []      Voice hoarseness:  No [x]/Yes []  Respiratory:             Cough:   No [x]/Yes []      Pleuritic chest pain:  No [x]/Yes []                        Dyspnea:   No [x]/Yes []      Wheezing:   No [x]/Yes []  Cardiovascular:             Angina:   No [x]/Yes []      Palpitations:   No [x]/Yes []          Claudication:    No [x]/Yes []      Leg swelling:   No [x]/Yes []  Gastrointestinal:             Nausea or vomiting:  No [x]/Yes []               Abdominal pain:  No [x]/Yes []                     Intestinal bleeding: No [x]/Yes []  Musculoskeletal:             Leg pain:   No [x]/Yes []      Back pain:   No [x]/Yes []                    Weakness:   No [x]/Yes []  Neurologic:             Numbness:   No [x]/Yes []      Paralysis:   No [x]/Yes [] Headaches:   No [x]/Yes []  Hematologic, lymphatic:   Anemia:   No [x]/Yes []              Bleeding or bruising:  No [x]/Yes []              Fevers or chills: No [x]/Yes []  Endocrine:             Temp intolerance:   No [x]/Yes []                       Polydipsia, polyuria:  No [x]/Yes []  Skin:              Rash:    No [x]/Yes []      Ulcers:   No [x]/Yes []              Abnorm pigment: No [x]/Yes []  :              Frequency/urgency:  No [x]/Yes []      Hematuria:    No [x]/Yes []                      Incontinence:    No [x]/Yes []    PHYSICAL EXAM:  Vitals:    08/31/18 0755   BP: 114/62   Pulse: 72   Resp: 14   Temp: 97.8 °F (36.6 °C)   SpO2: 97%     General Appearance: alert and oriented to person, place and time, well developed and well- nourished, in no acute distress  Skin: warm and dry, no rash or erythema  Head: normocephalic and atraumatic  Eyes: extraocular eye movements intact, conjunctivae normal  ENT: external ear and ear canal normal bilaterally, nose without deformity  Pulmonary/Chest: normal air movement, no respiratory distress  Cardiovascular: normal rate, regular rhythm  Musculoskeletal: normal range of motion, no joint deformity or tenderness  Neurologic: no cranial nerve deficit, speech normal  Extremities: no leg edema bilaterally    PULSE EXAM      Right      Left   Brachial     Radial 3 3   Femoral     Popliteal     Dorsalis Pedis     Posterior Tibial 3 3   (3=normal, 2=diminished, 1=barely palpable, 4=widened)      LABS:    Lab Results   Component Value Date    WBC 10.2 08/28/2018    HGB 16.7 (H) 08/28/2018    HCT 48.6 08/28/2018     08/28/2018    PROTIME 11.1 08/28/2018    INR 1.0 08/28/2018    K 4.4 08/31/2018    BUN 15 08/31/2018    CREATININE 0.9 08/31/2018       RADIOLOGY:    CTA head and neck reviewed.

## 2018-08-31 NOTE — CONSULTS
substance abuse. Family History:     Family History   Problem Relation Age of Onset    Cancer Mother     No Known Problems Father     Diabetes Sister     Diabetes Brother     Diabetes Paternal Grandmother     Cancer Paternal Grandfather         History of Present Illness:     History obtained from pt and EMR  80year old man with history of sever head trauma with resulting severe dizziness presents after a number of hits to the head over the past few weeks with severe dizziness and imbalance of gait. He has had CTAs and MRAs with mild scattered stenosis  CT head does not show any acute problem. Mild to moderate atrophy      Objective:   /68   Pulse 54   Temp 97.9 °F (36.6 °C) (Oral)   Resp 12   Ht 5' 10\" (1.778 m)   Wt 182 lb (82.6 kg)   SpO2 95%   BMI 26.11 kg/m²          General:laying in bed NAD  Head: AT NC  Eyes: sclera clear no nystagmus      Extremities:  Well perfused             Neurological Exam    Mental Status  The patient is alert and oriented to person, place, time, and situation. His recent and remote memory are normal. He has no dysarthria. He is unable to name object and read. He has normal attention span and concentration. He follows multi-step commands. He has a normal fund of knowledge. Cranial Nerves    CN II: The patient's visual acuity and visual fields are normal.  CN III, IV, VI: The patient's pupils are equally round and reactive to light and ocular movements are normal.  CN V: The patient has normal facial sensation  CN VII:  The patient has symmetric facial movement  CN VIII:  The patient's hearing is normal.  CN IX, X: The patient has symmetric palate movement and normal gag reflex. CN XI: The patient's shoulder shrug strength is normal.  CN XII: The patient's tongue is midline without atrophy or fasciculations. No nystagmus with frenzel goggle testing head impulse testing caused worsening of dizziness but no eye movement abnormality.      Motor  The patient has normal muscle bulk throughout. His overall muscle tone is normal throughout. His strength is 5/5 throughout all four extremities. Slight increase in LE tone     Sensory  The patient's sensation is to light touch and pinprick. Reflexes  Deep tendon reflexes are 2+ and symmetric except as noted. Right                     Left  Biceps                                   1+                         1+  Triceps                                  1+                         1+  Patellar                                 2+                         2+  Achilles                                2+                         2+    Gait and Coordination   He has a wide stance. Romberg's sign is sways with eyes closed. He has normal right finger to nose and normal left finger to nose coordination. Unable to do HTS       Laboratory/Radiology:       I independently reviewed the labs and imaging studies at today's appointment. Assessment:     I suspect ataxia and dizziness is acute on chronic related to many head traumas. Less likely stroke however we should complete work up given he has already had multiple modalities on intracranial vessel evaluation. Patient Active Problem List   Diagnosis    Ataxia    At high risk for injury related to fall    History of traumatic head injury    Post-traumatic headache    Hyperlipidemia    Essential (primary) hypertension    GERD (gastroesophageal reflux disease)    Frequency of urination    Major depression, chronic    Bilateral carotid artery stenosis       Plan:   Follow up MRI   Continue symptomatic control of dizziness.   PT/OT zulma.   safety training   Neuro rest.       [facility]  6:29 PM  8/31/2018

## 2018-08-31 NOTE — PROGRESS NOTES
Hospitalist Progress Note      PCP: Shorty Toribio DO    Date of Admission: 8/30/2018    Chief Complaint:  [address] Course: *admitted with ataxia  ? If had CVA MRI pending     Subjective:   No complaints      Medications:  Reviewed    Infusion Medications    dextrose       Scheduled Medications    amLODIPine  10 mg Oral Daily    aspirin  81 mg Oral Daily    atorvastatin  40 mg Oral Daily    tamsulosin  0.4 mg Oral Nightly    venlafaxine  150 mg Oral Daily    insulin lispro  0-10 Units Subcutaneous 4x Daily AC & HS    pantoprazole  40 mg Oral QAM AC    enoxaparin  40 mg Subcutaneous Daily     PRN Meds: glucose, dextrose, glucagon (rDNA), dextrose, ondansetron, acetaminophen, meclizine      Intake/Output Summary (Last 24 hours) at 08/31/18 1356  Last data filed at 08/30/18 2243   Gross per 24 hour   Intake              475 ml   Output                0 ml   Net              475 ml       Exam:    /62   Pulse 72   Temp 97.8 °F (36.6 °C) (Core)   Resp 14   Ht 5' 10\" (1.778 m)   Wt 182 lb (82.6 kg)   SpO2 97%   BMI 26.11 kg/m²           Gen: well developed  HEENT: NC/AT, moist mucous membranes, no oropharyngeal erythema or exudate  Neck: supple, trachea midline, no anterior cervical or SC LAD  Heart:  Normal s1/s2, RRR, no murmurs, gallops, or rubs. Lungs: cta  bilaterally,   Abd: bowel sounds present, soft, nontender, nondistended, no masses  Extrem:  No clubbing, cyanosis,   No  edema  Skin: no rashes or lesions  Psych: A & O x3  Neuro: grossly intact, moves all four extremities. Capillary Refill: Brisk,< 3 seconds   Peripheral Pulses: +2 palpable, equal bilaterally               Labs:   No results for input(s): WBC, HGB, HCT, PLT in the last 72 hours. Recent Labs      08/31/18   0709   NA  142   K  4.4   CL  102   CO2  27   BUN  15   CREATININE  0.9   CALCIUM  9.0     No results for input(s): AST, ALT, BILIDIR, BILITOT, ALKPHOS in the last 72 hours.   No results for
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
## 2018-09-01 LAB
ANION GAP SERPL CALCULATED.3IONS-SCNC: 10 MMOL/L (ref 7–16)
BUN BLDV-MCNC: 11 MG/DL (ref 8–23)
CALCIUM SERPL-MCNC: 8.8 MG/DL (ref 8.6–10.2)
CANNABINOIDS CONF, URINE: >500 NG/ML
CHLORIDE BLD-SCNC: 104 MMOL/L (ref 98–107)
CO2: 27 MMOL/L (ref 22–29)
CREAT SERPL-MCNC: 0.9 MG/DL (ref 0.7–1.2)
GFR AFRICAN AMERICAN: >60
GFR NON-AFRICAN AMERICAN: >60 ML/MIN/1.73
GLUCOSE BLD-MCNC: 100 MG/DL (ref 74–109)
METER GLUCOSE: 116 MG/DL (ref 70–110)
METER GLUCOSE: 126 MG/DL (ref 70–110)
METER GLUCOSE: 78 MG/DL (ref 70–110)
METER GLUCOSE: 95 MG/DL (ref 70–110)
POTASSIUM SERPL-SCNC: 4.5 MMOL/L (ref 3.5–5)
SODIUM BLD-SCNC: 141 MMOL/L (ref 132–146)

## 2018-09-01 PROCEDURE — G8979 MOBILITY GOAL STATUS: HCPCS

## 2018-09-01 PROCEDURE — 97162 PT EVAL MOD COMPLEX 30 MIN: CPT

## 2018-09-01 PROCEDURE — 6370000000 HC RX 637 (ALT 250 FOR IP): Performed by: INTERNAL MEDICINE

## 2018-09-01 PROCEDURE — 6360000002 HC RX W HCPCS: Performed by: INTERNAL MEDICINE

## 2018-09-01 PROCEDURE — G8978 MOBILITY CURRENT STATUS: HCPCS

## 2018-09-01 PROCEDURE — 2060000000 HC ICU INTERMEDIATE R&B

## 2018-09-01 PROCEDURE — 36415 COLL VENOUS BLD VENIPUNCTURE: CPT

## 2018-09-01 PROCEDURE — 99232 SBSQ HOSP IP/OBS MODERATE 35: CPT | Performed by: NURSE PRACTITIONER

## 2018-09-01 PROCEDURE — 80048 BASIC METABOLIC PNL TOTAL CA: CPT

## 2018-09-01 PROCEDURE — 6370000000 HC RX 637 (ALT 250 FOR IP): Performed by: HOSPITALIST

## 2018-09-01 PROCEDURE — 2580000003 HC RX 258: Performed by: INTERNAL MEDICINE

## 2018-09-01 PROCEDURE — 82962 GLUCOSE BLOOD TEST: CPT

## 2018-09-01 RX ORDER — SODIUM CHLORIDE 0.9 % (FLUSH) 0.9 %
10 SYRINGE (ML) INJECTION PRN
Status: DISCONTINUED | OUTPATIENT
Start: 2018-09-01 | End: 2018-09-05 | Stop reason: HOSPADM

## 2018-09-01 RX ORDER — MECLIZINE HYDROCHLORIDE 25 MG/1
25 TABLET ORAL 3 TIMES DAILY PRN
Qty: 60 TABLET | Refills: 0 | Status: SHIPPED | OUTPATIENT
Start: 2018-09-01 | End: 2018-09-11

## 2018-09-01 RX ORDER — SODIUM CHLORIDE 0.9 % (FLUSH) 0.9 %
10 SYRINGE (ML) INJECTION EVERY 12 HOURS SCHEDULED
Status: DISCONTINUED | OUTPATIENT
Start: 2018-09-01 | End: 2018-09-05 | Stop reason: HOSPADM

## 2018-09-01 RX ADMIN — ENOXAPARIN SODIUM 40 MG: 40 INJECTION SUBCUTANEOUS at 09:08

## 2018-09-01 RX ADMIN — ONDANSETRON 4 MG: 2 SOLUTION INTRAMUSCULAR; INTRAVENOUS at 07:46

## 2018-09-01 RX ADMIN — ATORVASTATIN CALCIUM 40 MG: 40 TABLET, FILM COATED ORAL at 09:08

## 2018-09-01 RX ADMIN — PANTOPRAZOLE SODIUM 40 MG: 40 TABLET, DELAYED RELEASE ORAL at 06:42

## 2018-09-01 RX ADMIN — MECLIZINE HCL 12.5 MG 25 MG: 12.5 TABLET ORAL at 07:43

## 2018-09-01 RX ADMIN — AMLODIPINE BESYLATE 10 MG: 10 TABLET ORAL at 09:08

## 2018-09-01 RX ADMIN — ASPIRIN 81 MG: 81 TABLET ORAL at 09:08

## 2018-09-01 RX ADMIN — Medication 10 ML: at 21:18

## 2018-09-01 RX ADMIN — VENLAFAXINE HYDROCHLORIDE 150 MG: 150 CAPSULE, EXTENDED RELEASE ORAL at 09:08

## 2018-09-01 RX ADMIN — MECLIZINE HCL 12.5 MG 25 MG: 12.5 TABLET ORAL at 17:17

## 2018-09-01 RX ADMIN — TAMSULOSIN HYDROCHLORIDE 0.4 MG: 0.4 CAPSULE ORAL at 21:11

## 2018-09-01 ASSESSMENT — PAIN SCALES - GENERAL
PAINLEVEL_OUTOF10: 0

## 2018-09-01 NOTE — CARE COORDINATION
SOCIAL WORK / DISCHARGE PLANNING:  Sw discussed case with Macario CHAVEZ, originally plan for home with Karl Rea and need for dme of bedside commode and hospital bed. PT eval completed, based on [facility] score MANUELA recommended. Per Jerome Adams he provided a MANUELA list to family and they are aware of need to make choices. Negrito will follow up.                        Electronically signed by MARTHA Guzman on 9/1/2018 at 4:24 PM

## 2018-09-01 NOTE — PROGRESS NOTES
Barry Castaneda is a 80 y.o. right handed male     Neurology is following for dizziness and ataxia.     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   II: pupils L pupil 2mm, R pupil blown   III,VII: ptosis None   III,IV,VI: extraocular muscles  Full ROM   V: mastication Normal   V: facial light touch sensation  Normal   V,VII: corneal reflex     VII: facial muscle function - upper  Normal   VII: facial muscle function - lower Normal   VIII: hearing Normal   IX: soft palate elevation  Normal   IX,X: gag reflex    XI: trapezius strength  5/5   XI: sternocleidomastoid strength 5/5   XI: neck extension strength  5/5   XII: tongue strength  Normal     Motor:  Generalized weakness most likely from poor effort as he states \"he just did all this for PT\"  3/5 throughout for me  Bilateral hand  5/5  Normal bulk and tone with LE increased tone    Sensory:  LT equal throughout, pt reports tingling all over body from head to toe chronic from trauma    Coordination:   Abnormal FNF exam bilaterally   Not cooperative to complete HKS exam     Gait:  Not tested     DTR:   Right Brachioradialis reflex 2+  Left Brachioradialis reflex 2+  Right Biceps reflex 1+  Left Biceps reflex 1+  Right Triceps reflex 1+  Left Triceps reflex 1+  Right Quadriceps reflex 2+  Left Quadriceps reflex 2+  Right Achilles reflex 2+  Left Achilles reflex 2+    No  GERD (gastroesophageal reflux disease)    Frequency of urination    Major depression, chronic    Bilateral carotid artery stenosis       Plan:     Treat symptomatic dizziness  Vestibular rehab with PT ordered  Continue PT/OT  Safety and gait training  Continue aspirin and statin   A1C 6.9, LDL 76       Ok to discharge to rehab once medically cleared and bed available.   Neuro will sign off      TITI Booker, CNP  12:36 PM  9/1/2018

## 2018-09-01 NOTE — PROGRESS NOTES
Hospitalist Progress Note      PCP: Brigido Farias DO    Date of Admission: 8/30/2018    Chief Complaint:  ataxia    Hospital Course: * no cva per MRI, per nurse( no PT note), they recommend MANUELA. SS called. Pt states he does feel a little better than when he first came in     Subjective: feeling better       Medications:  Reviewed    Infusion Medications    dextrose       Scheduled Medications    amLODIPine  10 mg Oral Daily    aspirin  81 mg Oral Daily    atorvastatin  40 mg Oral Daily    tamsulosin  0.4 mg Oral Nightly    venlafaxine  150 mg Oral Daily    insulin lispro  0-10 Units Subcutaneous 4x Daily AC & HS    pantoprazole  40 mg Oral QAM AC    enoxaparin  40 mg Subcutaneous Daily     PRN Meds: glucose, dextrose, glucagon (rDNA), dextrose, ondansetron, acetaminophen, meclizine      Intake/Output Summary (Last 24 hours) at 09/01/18 1319  Last data filed at 09/01/18 0755   Gross per 24 hour   Intake              880 ml   Output                0 ml   Net              880 ml       Exam:    /62   Pulse 52   Temp 97 °F (36.1 °C) (Temporal)   Resp 14   Ht 5' 10\" (1.778 m)   Wt 182 lb (82.6 kg)   SpO2 96%   BMI 26.11 kg/m²       Gen: well developed  HEENT: NC/AT, moist mucous membranes,   Neck: supple, trachea midline, no anterior cervical or SC LAD  Heart:  Normal s1/s2, RRR, no murmurs, gallops, or rubs. Lungs: cta  bilaterally,   Abd: bowel sounds present, soft, nontender, nondistended, no masses  Extrem:  No clubbing, cyanosis,   No  edema  Skin: no rashes or lesions  Psych: A & O x3  Neuro: grossly intact, moves all four extremities. Capillary Refill: Brisk,< 3 seconds   Peripheral Pulses: +2 palpable, equal bilaterally                     Labs:   No results for input(s): WBC, HGB, HCT, PLT in the last 72 hours.   Recent Labs      08/31/18   0709  09/01/18   0633   NA  142  141   K  4.4  4.5   CL  102  104   CO2  27  27   BUN  15  11   CREATININE  0.9  0.9   CALCIUM  9.0  8.8 No results for input(s): AST, ALT, BILIDIR, BILITOT, ALKPHOS in the last 72 hours. No results for input(s): INR in the last 72 hours. No results for input(s): Radha Bibber in the last 72 hours. No results for input(s): AST, ALT, ALB, BILIDIR, BILITOT, ALKPHOS in the last 72 hours. No results for input(s): LACTA in the last 72 hours.   No results found for: Madelyn [place]  No results found for: AMMONIA    Assessment:    Active Hospital Problems    Diagnosis Date Noted    Bilateral carotid artery stenosis [I65.23] 08/31/2018    Ataxia [R27.0] 08/30/2018   marijuana use  HTN  IIDM   HLD   GERD   major depression   abn cta of brachiocephalic v.     Plan:  MRI done,    cont statin   cont aspirin  Consult psych           DVT Prophylaxis: lovenox  Diet: DIET CARB CONTROL; Carb Control: 4 carb choices (60 gms)/meal  Code Status: Full Code     PT/OT Eval Status: ordered     Dispo -MANUELA recommended        Electronically signed by Meghna Prater DO on 9/1/2018 at 1:19 PM

## 2018-09-01 NOTE — PROGRESS NOTES
Physical Therapy    Facility/Department: [facility] NEURO IMCU  Initial Assessment    NAME: Chris Mckeon  : 1950  MRN: [id number]    Date of Service: 2018    Initial Assessment     Name: Chris Mckeon  : 1950  MRN: [id number]    Date of Service: 2018    Evaluating PT:  Wilberto Belle DPT    Room #:  4658/7822-B  Diagnosis:  Ataxia  PMHx:  To ED on 18 for ataxia/dizziness. Hx includes CVA, multiple/frequent falls, hx head traumas, anxiety, depression, HTN, substance abuse. MRI shows no acute ischemic insults. Precautions:  Falls, ataxia    Equipment Needs:  ww    Pt lives with wife in a 2 story home with 4 stairs to enter and 1 rail. Bed is on 2nd floor and bath is on 2nd floor (10+10 steps with bilateral rails). Pt ambulated with spc PTA. Initial Evaluation  Date: 18 Treatment Short Term/ Long Term   Goals   AM-PAC 6 Clicks 82/77     Was pt agreeable to Eval/treatment? yes     Does pt have pain? No pain     Bed Mobility  Rolling: Independent   Supine to sit: Independent   Sit to supine: Independent   Scooting: Independent      Transfers Sit to stand: Isma  Stand to sit: Isma  Stand pivot: Isma  Sit to stand: mod I with AAD  Stand to sit: mod I with AAD  Stand pivot: mod I with AAD   Ambulation    10 feet with ww modA  >150 feet with AAD mod I   Stair negotiation: ascended and descended NT  >20 steps with bilateral rail mod I   ROM BUE:  See OT notes  BLE:  WNL     Strength BUE:  See OT notes  BLE:  4-/5     Balance Sitting EOB:  sba->Isma  Dynamic Standing:  modA with ww  Sitting EOB:  Independent   Dynamic Standing: Mod I with AAD     Pt is A & O x 2-3. Pt reports he is at a hospital but gave wrong hospital name multiple times. Pt reports the year is . Reoriented to time and place.   Sensation:  Pt reports tingling/impaired sensation \"over my whole body\"  Edema:  None noted    Patient education  Pt educated on safety with mobility, hand placement with transfers    Patient

## 2018-09-02 LAB
ANION GAP SERPL CALCULATED.3IONS-SCNC: 14 MMOL/L (ref 7–16)
BLOOD CULTURE, ROUTINE: NORMAL
BUN BLDV-MCNC: 9 MG/DL (ref 8–23)
CALCIUM SERPL-MCNC: 9 MG/DL (ref 8.6–10.2)
CHLORIDE BLD-SCNC: 100 MMOL/L (ref 98–107)
CO2: 26 MMOL/L (ref 22–29)
CREAT SERPL-MCNC: 0.8 MG/DL (ref 0.7–1.2)
CULTURE, BLOOD 2: NORMAL
GFR AFRICAN AMERICAN: >60
GFR NON-AFRICAN AMERICAN: >60 ML/MIN/1.73
GLUCOSE BLD-MCNC: 94 MG/DL (ref 74–109)
METER GLUCOSE: 106 MG/DL (ref 70–110)
METER GLUCOSE: 131 MG/DL (ref 70–110)
METER GLUCOSE: 142 MG/DL (ref 70–110)
METER GLUCOSE: 96 MG/DL (ref 70–110)
POTASSIUM SERPL-SCNC: 3.9 MMOL/L (ref 3.5–5)
SODIUM BLD-SCNC: 140 MMOL/L (ref 132–146)

## 2018-09-02 PROCEDURE — 2060000000 HC ICU INTERMEDIATE R&B

## 2018-09-02 PROCEDURE — 97530 THERAPEUTIC ACTIVITIES: CPT

## 2018-09-02 PROCEDURE — 6360000002 HC RX W HCPCS: Performed by: INTERNAL MEDICINE

## 2018-09-02 PROCEDURE — 82962 GLUCOSE BLOOD TEST: CPT

## 2018-09-02 PROCEDURE — G8987 SELF CARE CURRENT STATUS: HCPCS

## 2018-09-02 PROCEDURE — G8988 SELF CARE GOAL STATUS: HCPCS

## 2018-09-02 PROCEDURE — 6370000000 HC RX 637 (ALT 250 FOR IP): Performed by: HOSPITALIST

## 2018-09-02 PROCEDURE — 6370000000 HC RX 637 (ALT 250 FOR IP): Performed by: INTERNAL MEDICINE

## 2018-09-02 PROCEDURE — 36415 COLL VENOUS BLD VENIPUNCTURE: CPT

## 2018-09-02 PROCEDURE — 80048 BASIC METABOLIC PNL TOTAL CA: CPT

## 2018-09-02 PROCEDURE — 97165 OT EVAL LOW COMPLEX 30 MIN: CPT

## 2018-09-02 RX ORDER — LACTULOSE 10 G/15ML
20 SOLUTION ORAL 2 TIMES DAILY
Status: DISCONTINUED | OUTPATIENT
Start: 2018-09-02 | End: 2018-09-05 | Stop reason: HOSPADM

## 2018-09-02 RX ORDER — BISACODYL 10 MG
10 SUPPOSITORY, RECTAL RECTAL DAILY PRN
Status: DISCONTINUED | OUTPATIENT
Start: 2018-09-02 | End: 2018-09-05 | Stop reason: HOSPADM

## 2018-09-02 RX ADMIN — AMLODIPINE BESYLATE 10 MG: 10 TABLET ORAL at 08:24

## 2018-09-02 RX ADMIN — LACTULOSE 20 G: 20 SOLUTION ORAL at 11:07

## 2018-09-02 RX ADMIN — TAMSULOSIN HYDROCHLORIDE 0.4 MG: 0.4 CAPSULE ORAL at 20:01

## 2018-09-02 RX ADMIN — ATORVASTATIN CALCIUM 40 MG: 40 TABLET, FILM COATED ORAL at 08:24

## 2018-09-02 RX ADMIN — ENOXAPARIN SODIUM 40 MG: 40 INJECTION SUBCUTANEOUS at 08:24

## 2018-09-02 RX ADMIN — ACETAMINOPHEN 650 MG: 325 TABLET, FILM COATED ORAL at 06:12

## 2018-09-02 RX ADMIN — ASPIRIN 81 MG: 81 TABLET ORAL at 08:24

## 2018-09-02 RX ADMIN — VENLAFAXINE HYDROCHLORIDE 150 MG: 150 CAPSULE, EXTENDED RELEASE ORAL at 08:24

## 2018-09-02 RX ADMIN — MECLIZINE HCL 12.5 MG 25 MG: 12.5 TABLET ORAL at 20:03

## 2018-09-02 RX ADMIN — MECLIZINE HCL 12.5 MG 25 MG: 12.5 TABLET ORAL at 08:25

## 2018-09-02 RX ADMIN — PANTOPRAZOLE SODIUM 40 MG: 40 TABLET, DELAYED RELEASE ORAL at 06:09

## 2018-09-02 ASSESSMENT — PAIN SCALES - GENERAL
PAINLEVEL_OUTOF10: 0
PAINLEVEL_OUTOF10: 3

## 2018-09-02 NOTE — PROGRESS NOTES
Physical Therapy    Medical chart reviewed for PT treatment 9/2 AM. Pt reporting increased dizziness this morning and requesting PT to return at a later time. Will re-attempt at a later time/date. Thank you.     Yasmani Dodson, PT, DPT  [phone]

## 2018-09-03 LAB
ANION GAP SERPL CALCULATED.3IONS-SCNC: 12 MMOL/L (ref 7–16)
BUN BLDV-MCNC: 11 MG/DL (ref 8–23)
CALCIUM SERPL-MCNC: 8.9 MG/DL (ref 8.6–10.2)
CHLORIDE BLD-SCNC: 103 MMOL/L (ref 98–107)
CO2: 28 MMOL/L (ref 22–29)
CREAT SERPL-MCNC: 0.9 MG/DL (ref 0.7–1.2)
GFR AFRICAN AMERICAN: >60
GFR NON-AFRICAN AMERICAN: >60 ML/MIN/1.73
GLUCOSE BLD-MCNC: 98 MG/DL (ref 74–109)
METER GLUCOSE: 122 MG/DL (ref 70–110)
METER GLUCOSE: 123 MG/DL (ref 70–110)
METER GLUCOSE: 87 MG/DL (ref 70–110)
POTASSIUM SERPL-SCNC: 4.2 MMOL/L (ref 3.5–5)
SODIUM BLD-SCNC: 143 MMOL/L (ref 132–146)

## 2018-09-03 PROCEDURE — 6360000002 HC RX W HCPCS: Performed by: INTERNAL MEDICINE

## 2018-09-03 PROCEDURE — 6370000000 HC RX 637 (ALT 250 FOR IP): Performed by: INTERNAL MEDICINE

## 2018-09-03 PROCEDURE — 80048 BASIC METABOLIC PNL TOTAL CA: CPT

## 2018-09-03 PROCEDURE — 2060000000 HC ICU INTERMEDIATE R&B

## 2018-09-03 PROCEDURE — 36415 COLL VENOUS BLD VENIPUNCTURE: CPT

## 2018-09-03 PROCEDURE — 82962 GLUCOSE BLOOD TEST: CPT

## 2018-09-03 PROCEDURE — 2580000003 HC RX 258: Performed by: INTERNAL MEDICINE

## 2018-09-03 PROCEDURE — 6370000000 HC RX 637 (ALT 250 FOR IP): Performed by: HOSPITALIST

## 2018-09-03 RX ADMIN — ATORVASTATIN CALCIUM 40 MG: 40 TABLET, FILM COATED ORAL at 08:44

## 2018-09-03 RX ADMIN — Medication 10 ML: at 12:33

## 2018-09-03 RX ADMIN — AMLODIPINE BESYLATE 10 MG: 10 TABLET ORAL at 09:16

## 2018-09-03 RX ADMIN — ACETAMINOPHEN 650 MG: 325 TABLET, FILM COATED ORAL at 10:39

## 2018-09-03 RX ADMIN — ASPIRIN 81 MG: 81 TABLET ORAL at 08:45

## 2018-09-03 RX ADMIN — Medication 10 ML: at 19:56

## 2018-09-03 RX ADMIN — HYDROMORPHONE HYDROCHLORIDE 0.5 MG: 1 INJECTION, SOLUTION INTRAMUSCULAR; INTRAVENOUS; SUBCUTANEOUS at 19:56

## 2018-09-03 RX ADMIN — Medication 10 ML: at 08:45

## 2018-09-03 RX ADMIN — PANTOPRAZOLE SODIUM 40 MG: 40 TABLET, DELAYED RELEASE ORAL at 06:41

## 2018-09-03 RX ADMIN — VENLAFAXINE HYDROCHLORIDE 150 MG: 150 CAPSULE, EXTENDED RELEASE ORAL at 08:44

## 2018-09-03 RX ADMIN — MECLIZINE HCL 12.5 MG 25 MG: 12.5 TABLET ORAL at 08:44

## 2018-09-03 RX ADMIN — HYDROMORPHONE HYDROCHLORIDE 0.5 MG: 1 INJECTION, SOLUTION INTRAMUSCULAR; INTRAVENOUS; SUBCUTANEOUS at 12:33

## 2018-09-03 RX ADMIN — ENOXAPARIN SODIUM 40 MG: 40 INJECTION SUBCUTANEOUS at 08:45

## 2018-09-03 RX ADMIN — MECLIZINE HCL 12.5 MG 25 MG: 12.5 TABLET ORAL at 19:56

## 2018-09-03 ASSESSMENT — PAIN DESCRIPTION - LOCATION
LOCATION: HEAD
LOCATION: HEAD

## 2018-09-03 ASSESSMENT — PAIN DESCRIPTION - PROGRESSION: CLINICAL_PROGRESSION: GRADUALLY WORSENING

## 2018-09-03 ASSESSMENT — PAIN DESCRIPTION - PAIN TYPE
TYPE: ACUTE PAIN
TYPE: ACUTE PAIN

## 2018-09-03 ASSESSMENT — PAIN DESCRIPTION - FREQUENCY: FREQUENCY: INTERMITTENT

## 2018-09-03 ASSESSMENT — PAIN DESCRIPTION - DESCRIPTORS
DESCRIPTORS: SQUEEZING;HEADACHE
DESCRIPTORS: ACHING;DISCOMFORT

## 2018-09-03 ASSESSMENT — PAIN SCALES - GENERAL
PAINLEVEL_OUTOF10: 8
PAINLEVEL_OUTOF10: 0
PAINLEVEL_OUTOF10: 8
PAINLEVEL_OUTOF10: 0
PAINLEVEL_OUTOF10: 0
PAINLEVEL_OUTOF10: 5
PAINLEVEL_OUTOF10: 0
PAINLEVEL_OUTOF10: 0

## 2018-09-03 ASSESSMENT — PAIN DESCRIPTION - ONSET: ONSET: ON-GOING

## 2018-09-03 NOTE — CONSULTS
10 mL, Intravenous, 2 times per day  sodium chloride flush 0.9 % injection 10 mL, 10 mL, Intravenous, PRN  amLODIPine (NORVASC) tablet 10 mg, 10 mg, Oral, Daily  aspirin EC tablet 81 mg, 81 mg, Oral, Daily  atorvastatin (LIPITOR) tablet 40 mg, 40 mg, Oral, Daily  tamsulosin (FLOMAX) capsule 0.4 mg, 0.4 mg, Oral, Nightly  venlafaxine (EFFEXOR XR) extended release capsule 150 mg, 150 mg, Oral, Daily  insulin lispro (HUMALOG) injection vial 0-10 Units, 0-10 Units, Subcutaneous, 4x Daily AC & HS  pantoprazole (PROTONIX) tablet 40 mg, 40 mg, Oral, QAM AC  enoxaparin (LOVENOX) injection 40 mg, 40 mg, Subcutaneous, Daily  glucose (GLUTOSE) 40 % oral gel 15 g, 15 g, Oral, PRN  dextrose 50 % solution 12.5 g, 12.5 g, Intravenous, PRN  glucagon (rDNA) injection 1 mg, 1 mg, Intramuscular, PRN  dextrose 5 % solution, 100 mL/hr, Intravenous, PRN  ondansetron (ZOFRAN) injection 4 mg, 4 mg, Intravenous, Q6H PRN  acetaminophen (TYLENOL) tablet 650 mg, 650 mg, Oral, Q4H PRN  meclizine (ANTIVERT) tablet 25 mg, 25 mg, Oral, TID PRN      MENTAL STATUS EXAM:       Mental Status Examination:    Cognition:      [x] Alert  [x] Awake  [x] Oriented  [x] Person  [x] Place [x] Time      [] drowsy  [] tired  [] lethargic  [] distractable     Attention/Concentration:   [x] Attentive  [] Distracted        Memory Recent and Remote: [] Intact   [] Impaired [] Partially Impaired     Language:  [] Able to recognize and name objects          [] Unable to recognize and name Objects    Fund of Knowledge:  [] Poor [x]  Fair  [] Good    Speech: [x] Normal  [] Soft  [] Slow  [] Fast [] Pressured            [] Loud [] Dysarthria  [] Incoherent       Appearance: [] Well Groomed  [] Casual Dressed  [] Unkept  [x] Disheveled          [x] Normal weight[] Thin  [] Overweight  [] Obese           Attitude: [] Positive  [] Hostile  [] Demanding  [] Guarded  [] Defensive         [x] Cooperative  []  Uncooperative      Behavior:  [] Normal Gait  [] Walks with

## 2018-09-04 LAB
ANION GAP SERPL CALCULATED.3IONS-SCNC: 12 MMOL/L (ref 7–16)
BUN BLDV-MCNC: 10 MG/DL (ref 8–23)
CALCIUM SERPL-MCNC: 8.7 MG/DL (ref 8.6–10.2)
CHLORIDE BLD-SCNC: 101 MMOL/L (ref 98–107)
CO2: 27 MMOL/L (ref 22–29)
CREAT SERPL-MCNC: 0.9 MG/DL (ref 0.7–1.2)
GFR AFRICAN AMERICAN: >60
GFR NON-AFRICAN AMERICAN: >60 ML/MIN/1.73
GLUCOSE BLD-MCNC: 98 MG/DL (ref 74–109)
METER GLUCOSE: 103 MG/DL (ref 70–110)
METER GLUCOSE: 122 MG/DL (ref 70–110)
METER GLUCOSE: 196 MG/DL (ref 70–110)
METER GLUCOSE: 212 MG/DL (ref 70–110)
POTASSIUM SERPL-SCNC: 4.4 MMOL/L (ref 3.5–5)
SODIUM BLD-SCNC: 140 MMOL/L (ref 132–146)

## 2018-09-04 PROCEDURE — 82962 GLUCOSE BLOOD TEST: CPT

## 2018-09-04 PROCEDURE — 80048 BASIC METABOLIC PNL TOTAL CA: CPT

## 2018-09-04 PROCEDURE — 6360000002 HC RX W HCPCS: Performed by: INTERNAL MEDICINE

## 2018-09-04 PROCEDURE — 6370000000 HC RX 637 (ALT 250 FOR IP): Performed by: INTERNAL MEDICINE

## 2018-09-04 PROCEDURE — 2580000003 HC RX 258: Performed by: INTERNAL MEDICINE

## 2018-09-04 PROCEDURE — 36415 COLL VENOUS BLD VENIPUNCTURE: CPT

## 2018-09-04 PROCEDURE — 6370000000 HC RX 637 (ALT 250 FOR IP): Performed by: HOSPITALIST

## 2018-09-04 PROCEDURE — 97535 SELF CARE MNGMENT TRAINING: CPT

## 2018-09-04 PROCEDURE — 2060000000 HC ICU INTERMEDIATE R&B

## 2018-09-04 RX ORDER — VENLAFAXINE HYDROCHLORIDE 150 MG/1
150 CAPSULE, EXTENDED RELEASE ORAL DAILY
Status: CANCELLED | OUTPATIENT
Start: 2018-09-05

## 2018-09-04 RX ORDER — MECLIZINE HCL 12.5 MG/1
25 TABLET ORAL 3 TIMES DAILY PRN
Status: CANCELLED | OUTPATIENT
Start: 2018-09-04

## 2018-09-04 RX ORDER — LACTULOSE 10 G/15ML
20 SOLUTION ORAL 2 TIMES DAILY
Status: CANCELLED | OUTPATIENT
Start: 2018-09-04

## 2018-09-04 RX ORDER — ATORVASTATIN CALCIUM 40 MG/1
40 TABLET, FILM COATED ORAL DAILY
Status: CANCELLED | OUTPATIENT
Start: 2018-09-05

## 2018-09-04 RX ORDER — ASPIRIN 81 MG/1
81 TABLET ORAL DAILY
Status: CANCELLED | OUTPATIENT
Start: 2018-09-05

## 2018-09-04 RX ORDER — BISACODYL 10 MG
10 SUPPOSITORY, RECTAL RECTAL DAILY PRN
Status: CANCELLED | OUTPATIENT
Start: 2018-09-04

## 2018-09-04 RX ORDER — HYDROCODONE BITARTRATE AND ACETAMINOPHEN 5; 325 MG/1; MG/1
1 TABLET ORAL EVERY 4 HOURS PRN
Status: DISCONTINUED | OUTPATIENT
Start: 2018-09-04 | End: 2018-09-05 | Stop reason: HOSPADM

## 2018-09-04 RX ORDER — ACETAMINOPHEN 325 MG/1
650 TABLET ORAL EVERY 4 HOURS PRN
Status: CANCELLED | OUTPATIENT
Start: 2018-09-04

## 2018-09-04 RX ORDER — AMLODIPINE BESYLATE 10 MG/1
10 TABLET ORAL DAILY
Status: CANCELLED | OUTPATIENT
Start: 2018-09-05

## 2018-09-04 RX ORDER — TAMSULOSIN HYDROCHLORIDE 0.4 MG/1
0.4 CAPSULE ORAL NIGHTLY
Status: CANCELLED | OUTPATIENT
Start: 2018-09-04

## 2018-09-04 RX ORDER — HYDROCODONE BITARTRATE AND ACETAMINOPHEN 5; 325 MG/1; MG/1
1 TABLET ORAL EVERY 6 HOURS PRN
Status: CANCELLED | OUTPATIENT
Start: 2018-09-04

## 2018-09-04 RX ADMIN — PANTOPRAZOLE SODIUM 40 MG: 40 TABLET, DELAYED RELEASE ORAL at 05:41

## 2018-09-04 RX ADMIN — ONDANSETRON 4 MG: 2 SOLUTION INTRAMUSCULAR; INTRAVENOUS at 13:02

## 2018-09-04 RX ADMIN — MECLIZINE HCL 12.5 MG 25 MG: 12.5 TABLET ORAL at 08:38

## 2018-09-04 RX ADMIN — ATORVASTATIN CALCIUM 40 MG: 40 TABLET, FILM COATED ORAL at 08:38

## 2018-09-04 RX ADMIN — AMLODIPINE BESYLATE 10 MG: 10 TABLET ORAL at 08:38

## 2018-09-04 RX ADMIN — Medication 10 ML: at 08:38

## 2018-09-04 RX ADMIN — ASPIRIN 81 MG: 81 TABLET ORAL at 08:38

## 2018-09-04 RX ADMIN — HYDROCODONE BITARTRATE AND ACETAMINOPHEN 1 TABLET: 5; 325 TABLET ORAL at 10:56

## 2018-09-04 RX ADMIN — VENLAFAXINE HYDROCHLORIDE 150 MG: 150 CAPSULE, EXTENDED RELEASE ORAL at 08:38

## 2018-09-04 RX ADMIN — ENOXAPARIN SODIUM 40 MG: 40 INJECTION SUBCUTANEOUS at 08:38

## 2018-09-04 RX ADMIN — INSULIN LISPRO 2 UNITS: 100 INJECTION, SOLUTION INTRAVENOUS; SUBCUTANEOUS at 16:04

## 2018-09-04 ASSESSMENT — PAIN SCALES - GENERAL
PAINLEVEL_OUTOF10: 4
PAINLEVEL_OUTOF10: 0

## 2018-09-04 NOTE — PROGRESS NOTES
Hospitalist Progress Note      PCP: Priti Garcia DO    Date of Admission: 8/30/2018    Chief Complaint: dizziness/ ataxia     Hospital Course: *No cva per MRI, per nurse( no PT note), they recommend MANUELA. SS called. Pt states he does feel a little better than when he first came in  . Now he says he has worsening dizziness today     Subjective:    Dizziness /constipation  Refers still feels dizzy      Medications:  Reviewed    Infusion Medications    dextrose       Scheduled Medications    lactulose  20 g Oral BID    sodium chloride flush  10 mL Intravenous 2 times per day    amLODIPine  10 mg Oral Daily    aspirin  81 mg Oral Daily    atorvastatin  40 mg Oral Daily    tamsulosin  0.4 mg Oral Nightly    venlafaxine  150 mg Oral Daily    insulin lispro  0-10 Units Subcutaneous 4x Daily AC & HS    pantoprazole  40 mg Oral QAM AC    enoxaparin  40 mg Subcutaneous Daily     PRN Meds: HYDROmorphone, bisacodyl, sodium chloride flush, glucose, dextrose, glucagon (rDNA), dextrose, ondansetron, acetaminophen, meclizine      Intake/Output Summary (Last 24 hours) at 09/03/18 1217  Last data filed at 09/03/18 1353   Gross per 24 hour   Intake               20 ml   Output                0 ml   Net               20 ml       Exam:    /80   Pulse 50   Temp 97.4 °F (36.3 °C) (Oral)   Resp 16   Ht 5' 10\" (1.778 m)   Wt 182 lb (82.6 kg)   SpO2 94%   BMI 26.11 kg/m²     Gen: well developed  HEENT: NC/AT, moist mucous membranes,   Neck: supple, trachea midline, no anterior cervical or SC LAD  Heart:  Normal s1/s2, RRR, no murmurs, gallops, or rubs.    Lungs: cta  bilaterally,   Abd: bowel sounds present, soft, nontender,  Extrem:  No clubbing, cyanosis,   No  edema  Skin: no rashes or lesions  Psych: A & O x3  Neuro: grossly intact, moves all four extremities.    Capillary Refill: Brisk,< 3 seconds   Peripheral Pulses: +2 palpable, equal bilaterally                     Labs:   No results for input(s): WBC, HGB, HCT, PLT in the last 72 hours. Recent Labs      09/01/18   0633  09/02/18   0703  09/03/18   0554   NA  141  140  143   K  4.5  3.9  4.2   CL  104  100  103   CO2  27  26  28   BUN  11  9  11   CREATININE  0.9  0.8  0.9   CALCIUM  8.8  9.0  8.9     No results for input(s): AST, ALT, BILIDIR, BILITOT, ALKPHOS in the last 72 hours. No results for input(s): INR in the last 72 hours. No results for input(s): Abdirahman Ma in the last 72 hours. No results for input(s): AST, ALT, ALB, BILIDIR, BILITOT, ALKPHOS in the last 72 hours. No results for input(s): LACTA in the last 72 hours.   No results found for: Echo Factor  No results found for: AMMONIA    Assessment:    Active Hospital Problems    Diagnosis Date Noted    Bilateral carotid artery stenosis [I65.23] 08/31/2018    Ataxia [R27.0] 08/30/2018   marijuana use  HTN  IIDM   HLD   GERD   major depression   abn cta of brachiocephalic v.( no further workup)   constipation    Plan:  Cont antivert   cont statin   cont aspirin  Consult psych   lactulose     Pending precert for discharge     DVT Prophylaxis: lovenox  Diet: DIET CARB CONTROL; Carb Control: 4 carb choices (60 gms)/meal  Code Status: Full Code     PT/OT Eval Status: ordered     Dispo -MANUELA recommended  Awaiting precert          Electronically signed by Christian Pride MD on 9/3/2018 at 10:17 PM

## 2018-09-04 NOTE — PROGRESS NOTES
ADLS    Functional Assessment:    Initial Status 9/2 Current Status 9/4/18   Feeding  S; set up Talisha Benton  Will continue to assess. Grooming  SBA  SBA  Seated at EOB pt demo'd ability to comb hair with assist required for dynamic sitting balance/safety. Fair tolerance. Upper Body Dressing SBA  SBA  To [facility] gown seated at EOB. Min cues for sequencing required.    Lower Body Dressing Min A  Min A  Pt demo'd ability to don/doff socks utilizing leg cross over technique seated at EOB. Pt required Min A for standing balance/safety while adjusting pants around waist in standing d/t unsteadiness. Bathing NT  NT   Toileting  NT  NT   Bed Mobility  Supine to Sit: SBA  Sit to Supine:SBA  Supine<>Sit: SBA  Min cues for safety required. Pt reports dizziness with positional change requiring ~1 minute to recover. Functional Transfers Min A sit<>stand   Sit<>Stand: Min A  Min cues for hand placement/safety required with fair- carryover. Pt initially c/o dizziness with positional changes however pt reports symptoms decreased quickly. Functional Mobility Min A [address]; mod cues for walker safety; mod cues to sequence steps and regard base of support. Pt reporting weak knees; fearful of buckling  Min A  Completed < household distances with w/w with mod cues for walker safety and proximity. Mod cues for sequencing and to widen SANDY provided with poor carryover. Pt reports \"my legs don't work\". Sit balance: Min A to SBA seated at EOB x5 minutes. Pt demo'd L lateral LOB requiring Min A to recover initially upon sitting at EOB. Stand balance: Min A with w/w. Endurance/Activity tolerance: Fair. Limited by c/o dizziness. Comments: Upon arrival pt in supine with HOB elevated. Pt educated on transfer/mobility safety, walker safety, sequencing, fall prevention, adadptive techniques for ADL tasks and benefits of increasing activity. Pt verbalized/demonstrated fair- understanding, recommend continued educatiion.  Pt

## 2018-09-05 ENCOUNTER — HOSPITAL ENCOUNTER (INPATIENT)
Age: 68
LOS: 3 days | Discharge: HOME | DRG: 881 | End: 2018-09-08
Attending: PSYCHIATRY & NEUROLOGY | Admitting: PSYCHIATRY & NEUROLOGY
Payer: MEDICARE

## 2018-09-05 VITALS
TEMPERATURE: 98.6 F | RESPIRATION RATE: 18 BRPM | OXYGEN SATURATION: 96 % | HEART RATE: 61 BPM | DIASTOLIC BLOOD PRESSURE: 77 MMHG | BODY MASS INDEX: 26.05 KG/M2 | HEIGHT: 70 IN | WEIGHT: 182 LBS | SYSTOLIC BLOOD PRESSURE: 116 MMHG

## 2018-09-05 PROBLEM — F32.2 SEVERE MAJOR DEPRESSION WITHOUT PSYCHOTIC FEATURES (HCC): Status: ACTIVE | Noted: 2018-09-05

## 2018-09-05 LAB
ANION GAP SERPL CALCULATED.3IONS-SCNC: 12 MMOL/L (ref 7–16)
BUN BLDV-MCNC: 10 MG/DL (ref 8–23)
CALCIUM SERPL-MCNC: 9.2 MG/DL (ref 8.6–10.2)
CHLORIDE BLD-SCNC: 104 MMOL/L (ref 98–107)
CO2: 24 MMOL/L (ref 22–29)
CREAT SERPL-MCNC: 0.9 MG/DL (ref 0.7–1.2)
GFR AFRICAN AMERICAN: >60
GFR NON-AFRICAN AMERICAN: >60 ML/MIN/1.73
GLUCOSE BLD-MCNC: 121 MG/DL (ref 74–109)
METER GLUCOSE: 106 MG/DL (ref 70–110)
METER GLUCOSE: 116 MG/DL (ref 70–110)
POTASSIUM SERPL-SCNC: 3.8 MMOL/L (ref 3.5–5)
SODIUM BLD-SCNC: 140 MMOL/L (ref 132–146)

## 2018-09-05 PROCEDURE — 1240000000 HC EMOTIONAL WELLNESS R&B

## 2018-09-05 PROCEDURE — G8979 MOBILITY GOAL STATUS: HCPCS

## 2018-09-05 PROCEDURE — 82962 GLUCOSE BLOOD TEST: CPT

## 2018-09-05 PROCEDURE — 97535 SELF CARE MNGMENT TRAINING: CPT

## 2018-09-05 PROCEDURE — 6370000000 HC RX 637 (ALT 250 FOR IP): Performed by: INTERNAL MEDICINE

## 2018-09-05 PROCEDURE — G8978 MOBILITY CURRENT STATUS: HCPCS

## 2018-09-05 PROCEDURE — 80048 BASIC METABOLIC PNL TOTAL CA: CPT

## 2018-09-05 PROCEDURE — 6370000000 HC RX 637 (ALT 250 FOR IP): Performed by: HOSPITALIST

## 2018-09-05 PROCEDURE — 97161 PT EVAL LOW COMPLEX 20 MIN: CPT

## 2018-09-05 PROCEDURE — 36415 COLL VENOUS BLD VENIPUNCTURE: CPT

## 2018-09-05 PROCEDURE — 97110 THERAPEUTIC EXERCISES: CPT

## 2018-09-05 PROCEDURE — 2580000003 HC RX 258: Performed by: INTERNAL MEDICINE

## 2018-09-05 RX ORDER — AMLODIPINE BESYLATE 10 MG/1
10 TABLET ORAL DAILY
Status: DISCONTINUED | OUTPATIENT
Start: 2018-09-06 | End: 2018-09-08 | Stop reason: HOSPADM

## 2018-09-05 RX ORDER — MECLIZINE HCL 12.5 MG/1
25 TABLET ORAL 3 TIMES DAILY PRN
Status: DISCONTINUED | OUTPATIENT
Start: 2018-09-05 | End: 2018-09-08 | Stop reason: HOSPADM

## 2018-09-05 RX ORDER — BISACODYL 10 MG
10 SUPPOSITORY, RECTAL RECTAL DAILY PRN
Status: DISCONTINUED | OUTPATIENT
Start: 2018-09-05 | End: 2018-09-08 | Stop reason: HOSPADM

## 2018-09-05 RX ORDER — HALOPERIDOL 5 MG/ML
2 INJECTION INTRAMUSCULAR EVERY 4 HOURS PRN
Status: DISCONTINUED | OUTPATIENT
Start: 2018-09-05 | End: 2018-09-08 | Stop reason: HOSPADM

## 2018-09-05 RX ORDER — ASPIRIN 81 MG/1
81 TABLET ORAL DAILY
Status: DISCONTINUED | OUTPATIENT
Start: 2018-09-06 | End: 2018-09-08 | Stop reason: HOSPADM

## 2018-09-05 RX ORDER — MAGNESIUM HYDROXIDE/ALUMINUM HYDROXICE/SIMETHICONE 120; 1200; 1200 MG/30ML; MG/30ML; MG/30ML
30 SUSPENSION ORAL PRN
Status: DISCONTINUED | OUTPATIENT
Start: 2018-09-05 | End: 2018-09-08 | Stop reason: HOSPADM

## 2018-09-05 RX ORDER — LORAZEPAM 0.5 MG/1
0.5 TABLET ORAL EVERY 8 HOURS PRN
Status: DISCONTINUED | OUTPATIENT
Start: 2018-09-05 | End: 2018-09-08 | Stop reason: HOSPADM

## 2018-09-05 RX ORDER — ATORVASTATIN CALCIUM 40 MG/1
40 TABLET, FILM COATED ORAL DAILY
Status: DISCONTINUED | OUTPATIENT
Start: 2018-09-06 | End: 2018-09-08 | Stop reason: HOSPADM

## 2018-09-05 RX ORDER — ACETAMINOPHEN 325 MG/1
650 TABLET ORAL EVERY 4 HOURS PRN
Status: DISCONTINUED | OUTPATIENT
Start: 2018-09-05 | End: 2018-09-08 | Stop reason: HOSPADM

## 2018-09-05 RX ORDER — VENLAFAXINE HYDROCHLORIDE 150 MG/1
150 CAPSULE, EXTENDED RELEASE ORAL DAILY
Status: DISCONTINUED | OUTPATIENT
Start: 2018-09-06 | End: 2018-09-08 | Stop reason: HOSPADM

## 2018-09-05 RX ORDER — TAMSULOSIN HYDROCHLORIDE 0.4 MG/1
0.4 CAPSULE ORAL NIGHTLY
Status: DISCONTINUED | OUTPATIENT
Start: 2018-09-05 | End: 2018-09-08 | Stop reason: HOSPADM

## 2018-09-05 RX ORDER — HYDROCODONE BITARTRATE AND ACETAMINOPHEN 5; 325 MG/1; MG/1
1 TABLET ORAL EVERY 6 HOURS PRN
Status: DISCONTINUED | OUTPATIENT
Start: 2018-09-05 | End: 2018-09-08 | Stop reason: HOSPADM

## 2018-09-05 RX ORDER — BENZTROPINE MESYLATE 1 MG/ML
2 INJECTION INTRAMUSCULAR; INTRAVENOUS 2 TIMES DAILY PRN
Status: DISCONTINUED | OUTPATIENT
Start: 2018-09-05 | End: 2018-09-08 | Stop reason: HOSPADM

## 2018-09-05 RX ADMIN — TAMSULOSIN HYDROCHLORIDE 0.4 MG: 0.4 CAPSULE ORAL at 21:41

## 2018-09-05 RX ADMIN — ATORVASTATIN CALCIUM 40 MG: 40 TABLET, FILM COATED ORAL at 09:02

## 2018-09-05 RX ADMIN — VENLAFAXINE HYDROCHLORIDE 150 MG: 150 CAPSULE, EXTENDED RELEASE ORAL at 09:02

## 2018-09-05 RX ADMIN — PANTOPRAZOLE SODIUM 40 MG: 40 TABLET, DELAYED RELEASE ORAL at 07:00

## 2018-09-05 RX ADMIN — MECLIZINE HCL 12.5 MG 25 MG: 12.5 TABLET ORAL at 21:41

## 2018-09-05 RX ADMIN — AMLODIPINE BESYLATE 10 MG: 10 TABLET ORAL at 09:02

## 2018-09-05 RX ADMIN — ASPIRIN 81 MG: 81 TABLET ORAL at 09:02

## 2018-09-05 RX ADMIN — MECLIZINE HCL 12.5 MG 25 MG: 12.5 TABLET ORAL at 06:39

## 2018-09-05 RX ADMIN — Medication 10 ML: at 09:02

## 2018-09-05 ASSESSMENT — PAIN SCALES - GENERAL
PAINLEVEL_OUTOF10: 0

## 2018-09-05 ASSESSMENT — LIFESTYLE VARIABLES: HISTORY_ALCOHOL_USE: YES

## 2018-09-05 NOTE — ED NOTES
PER JULIO SCOTT GALVEZ, PATIENT IS ACCEPTED TO 7W BY DR. Danyel Olson. PATIENT TO GO TO ROOM #5457. FAXED VOLUNTARY FORM TO [facility]. N2N: EXT [id number]    CALLED ADMITTING AND NOTIFIED Jose J Olivas. CALLED UNIT & NOTIFIED SCOTT PLATT TO COMPLETE PRE-CERTIFICATION.      PHILL Mar, [facility]  09/05/18 0644
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
## 2018-09-05 NOTE — PROGRESS NOTES
`[facility]  Admission Note     Admission Type:   Admission Type: Voluntary    Reason for admission:  Reason for Admission: depression    PATIENT STRENGTHS:       Patient Strengths and Limitations:  Limitations: Inappropriate/potentially harmful leisure interests, Multiple barriers to leisure interests, Lacks leisure interests, Difficulty problem solving/relies on others to help solve problems, Hopeless about future    Addictive Behavior:   Addictive Behavior  In the past 3 months, have you felt or has someone told you that you have a problem with:  : Other(Comments)  Do you have a history of Chemical Use?: Yes  Do you have a history of Alcohol Use?: Yes  Do you have a history of Street Drug Abuse?: Yes    Medical Problems:   Past Medical History:   Diagnosis Date    Anxiety     Cerebral artery occlusion with cerebral infarction ([facility] [address] 1990    Depression     GERD (gastroesophageal reflux disease)     Headache     Hyperlipidemia     Hypertension     Substance abuse        Status EXAM:  Status and Exam  Normal: Yes  Facial Expression: Expressionless  Affect: Appropriate  Level of Consciousness: Alert  Mood:Normal: Yes  Motor Activity:Normal: Yes  Interview Behavior: Cooperative  Preception: [place] to Person, [place] to Place  Attention:Normal: Yes  Hallucinations: None  Delusions: No  Memory:Normal: No  Memory: Other(See comment)  Insight and Judgment: No  Insight and Judgment: Poor Judgment, Poor Insight  Present Suicidal Ideation: No  Present Homicidal Ideation: No    Tobacco Screening:  Practical Counseling, on admission, dawit X, if applicable and completed (first 3 are required if patient doesn't refuse):            (X )  Recognizing danger situations (included triggers and roadblocks)                    (X )  Coping skills (new ways to manage stress, exercise, relaxation techniques, changing routine, distraction)                                                           ( )  Basic

## 2018-09-05 NOTE — PROGRESS NOTES
Patient refusing to have this RN check blood sugar, and give medications. Patient offered nausea medication as well as something to help with dizziness and vertigo, patient also refusing.

## 2018-09-05 NOTE — PROGRESS NOTES
Physical Therapy    Facility/Department: [facility]  2  Initial Assessment    NAME: Haley Lei  : 1950  MRN: [id number]    Date of Service: 2018  Evaluating Therapist: Marcela Dolan DPT      Room #: 1698E  DIAGNOSIS: severe major depression  PMHx:  To ED on 18 for ataxia/dizziness. Hx includes CVA, multiple/frequent falls, hx head traumas, anxiety, depression, HTN, substance abuse. MRI shows no acute ischemic insults. Precautions:  Falls, ataxia     Equipment Needs:  ww     Pt lives with wife in a 2 story home with 4 stairs to enter and 1 rail. Bed is on 2nd floor and bath is on 2nd floor (10+10 steps with bilateral rails). Pt ambulated with spc PTA. Initial Evaluation  Date: 18 Treatment  Date: NA  Short Term/ Long Term   Goals   Was pt agreeable to Eval/treatment? yes     Did pt report pain? Pt reported aching/pains in B LEs with activity     Bed Mobility  Rolling: NT   Supine to sit: SBA   Sit to supine: SBA   Scooting: SBA   Mod I   Transfers Sit to stand: SBA  Stand to sit: SBA  Stand pivot: SBA  Mod I   Ambulation    20 feet x2 with ww with CGA  75 feet with AAD with mod I   Stair negotiation: ascended and descended NT   20 steps with 1 rail with CGA   AMPAC Raw Score 17/24       Alertness/Orientation: x4 however demonstrates confusion at times during conversation  LE AROM: Grossly WFL   LE Strength: Grossly 4/5  Static Balance: standing with ww CGA  Dynamic Balance: standing with ww CGA   Endurance: poor--limited by vertigo and LE fatigue   Sensation: denied numbness/tingling   Edema/Skin Integrity: no visible skin integrity issues noted     Chair/Bed Alarm: NA      ASSESSMENT/TREATMENT  Pt displays functional ability as noted in the objective portion of this evaluation.       Pt performed the following therapeutic activities/exercise:   Seated ankle pumps, LAQ, hip flexion, hip abduction/adduction all x5 sets of 5 reps each  W/c propulstion x 100 feet with B UEs and supervision   Ambulation and gait training per above    Comments: Pt was sitting in w/c upon PT arrival and agreeable to PT evaluation/treatment. Pt demonstrated unsteadiness while ambulating and was limited by LE weakness and discomfort, also c/o transient vertigo when performing functional activity which pt reports is chronic. Pt tolerated TE well--limited endurance due to c/o cramping in LE muscualture with multiple reps. Pending progress toward goals recommend continued skilled PT services at discharge as pt is unsafe to discharge home at this time and demonstrates increased risk of falls. Pt educated on importance of regular activity with assistance on unit. At end of session pt sitting in w/c in common area with call light within reach and all needs met. Patient education  Pt educated re: safety recommendations, PT treatment expectations and POC, PT d/c recommendations. Patient response to education:   Pt verbalized understanding Pt demonstrated skill Pt requires further education in this area   Yes  partial yes     Rehab potential is good for reaching above PT goals. Pts/ family goals   To get better       PLAN  PT care will be provided in accordance with the objectives noted above. Whenever appropriate, clear delegation orders will be provided for nursing staff. Exercises and functional mobility practice will be used as well as appropriate assistive devices or modalities to obtain goals. Patient and family education will also be administered as needed. Frequency of treatments will be 2-5x/week x 2-4 days. Patient and or family understand(s) diagnosis, prognosis, and plan of care.       Time in: 1446  Time out: RADHA Richter   License number:  [id number]

## 2018-09-06 PROBLEM — F31.60 MIXED BIPOLAR I DISORDER (HCC): Status: ACTIVE | Noted: 2018-09-06

## 2018-09-06 LAB
METER GLUCOSE: 126 MG/DL (ref 70–110)
METER GLUCOSE: 92 MG/DL (ref 70–110)

## 2018-09-06 PROCEDURE — 1240000000 HC EMOTIONAL WELLNESS R&B

## 2018-09-06 PROCEDURE — 82962 GLUCOSE BLOOD TEST: CPT

## 2018-09-06 PROCEDURE — 99222 1ST HOSP IP/OBS MODERATE 55: CPT | Performed by: PSYCHIATRY & NEUROLOGY

## 2018-09-06 PROCEDURE — 6370000000 HC RX 637 (ALT 250 FOR IP): Performed by: PSYCHIATRY & NEUROLOGY

## 2018-09-06 PROCEDURE — 6370000000 HC RX 637 (ALT 250 FOR IP): Performed by: INTERNAL MEDICINE

## 2018-09-06 RX ORDER — ARIPIPRAZOLE 2 MG/1
2 TABLET ORAL EVERY EVENING
Status: DISCONTINUED | OUTPATIENT
Start: 2018-09-06 | End: 2018-09-07

## 2018-09-06 RX ADMIN — ATORVASTATIN CALCIUM 40 MG: 40 TABLET, FILM COATED ORAL at 09:50

## 2018-09-06 RX ADMIN — VENLAFAXINE HYDROCHLORIDE 150 MG: 150 CAPSULE, EXTENDED RELEASE ORAL at 09:50

## 2018-09-06 RX ADMIN — ARIPIPRAZOLE 2 MG: 2 TABLET ORAL at 18:16

## 2018-09-06 RX ADMIN — TAMSULOSIN HYDROCHLORIDE 0.4 MG: 0.4 CAPSULE ORAL at 20:41

## 2018-09-06 RX ADMIN — MECLIZINE HCL 12.5 MG 25 MG: 12.5 TABLET ORAL at 15:51

## 2018-09-06 RX ADMIN — MECLIZINE HCL 12.5 MG 25 MG: 12.5 TABLET ORAL at 09:50

## 2018-09-06 RX ADMIN — AMLODIPINE BESYLATE 10 MG: 10 TABLET ORAL at 10:00

## 2018-09-06 RX ADMIN — ASPIRIN 81 MG: 81 TABLET ORAL at 09:50

## 2018-09-06 ASSESSMENT — SLEEP AND FATIGUE QUESTIONNAIRES
DO YOU HAVE DIFFICULTY SLEEPING: NO
DO YOU USE A SLEEP AID: NO

## 2018-09-06 ASSESSMENT — LIFESTYLE VARIABLES: HISTORY_ALCOHOL_USE: YES

## 2018-09-06 NOTE — PROGRESS NOTES
Briefly attended recreational group this afternoon. Minimal interest in activity, but recepetive to encouragement. Pleasant and interacting with peers. Expresses depression as current barrier.
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
## 2018-09-06 NOTE — PROGRESS NOTES
Patient out in dayroom. The T tried talking to the patient regarding taking the patients' blood sugar and getting a shower and the patient started screaming at the [facility] saying \"get the fuck away from me. I don't want you fucking talking to me. \"  Staff attempted to speak with the patient but he stated \"I'm fucking leaving tomorrow. I'll break a fucking window if I have to, I'm leaving tomorrow. \"  Patient refused getting his blood sugar checked.
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
## 2018-09-06 NOTE — PROGRESS NOTES
Patient attended community meeting. Shared daily goal and participated in morning stretch/exercise. Identified goal as \" Behave myself, not yell or scream, listen and learn\". Positively participated in discussion on the importance of supports. Shared his wife of 48 years as main support. Proudly shares that his 3 sons and 1 daughter also are helpful. Apologetic for \"loosing my cool with that doctor, that's just not me\".

## 2018-09-06 NOTE — PLAN OF CARE
Problem: Anger Management/Homicidal Ideation:  Goal: Able to display appropriate communication and problem solving  Able to display appropriate communication and problem solving  Outcome: Ongoing    Goal: Ability to verbalize frustrations and anger appropriately will improve  Ability to verbalize frustrations and anger appropriately will improve  Outcome: Ongoing  Patient isolative to room, appropriately groomed and attired. Patient is irritable and angry towards staff and having to be here. Denies thoughts or intent to harm self or others at this time. Denies audio or visual hallucinations at this time. Reports no concerns or complaints, no signs or symptoms of distress or discomfort. Unable to maintain control of behaviors and emotions. Patient is encouraged to continue to work towards discharge goal by complying with medications, attending groups and to seek staff if feelings are overwhelming. Environmental rounds completed per unit policy to maintain safety of everyone on the unit. Staff will offer support and interventions as requested or required.

## 2018-09-06 NOTE — PLAN OF CARE
Problem: Nutrition  Goal: Optimal nutrition therapy  Outcome: Ongoing  Nutrition Problem: Inadequate oral intake  Intervention: Food and/or Nutrient Delivery: Continue current diet (Pt refused ONS.)  Nutritional Goals: Consume > 75% of most meals.

## 2018-09-06 NOTE — PROGRESS NOTES
This nurse went down to patients' room to talk to patient. Patient sitting on the floor. Asked the patient if he fell and he said \"no, I just sat down because I'm so fucking mad and I'm ready to break out of the fucking window. I'm going to be nice to you ma'am and ask you to please leave the fuck alone. \"  This nurse offered the patient PO ativan for anxiety and to help relax him, and the patient stated \"I don't want anything except to be left the fuck alone. \"  Patient came out to the nurses station in his wheelchair and threw his patient folder onto the counter, turned around and headed back towards his room. Patient left the wheelchair in the middle of the hallway and another staff member went to take it into his room and patient stated \"I don't want that fucking wheelchair. \"  The nurse removed the wheelchair from the patients' room and told the patient that it would be available if he needed/wanted it. Environmental rounds completed per unit policy to maintain safety of everyone on the unit. Staff will offer support and interventions as requested or required.

## 2018-09-06 NOTE — PROGRESS NOTES
Patient on phone with someone talking about refusing all medications, and how \"I'm not taking these medications from these people. \"  Patient also stating \"I'm not sleeping in that bed. I'm going to sleep on the floor. \"  Patient talking to person on phone stating \"when you come up here don't bring anything. They'll probably make you take everything off and they'll probably keep it. Leave your phone at home, because these people are shady and they'll probably take all of your stuff. \"      Patient then came up to nurses station asking \"why didn't I get my night time medications? \"  This nurse went onto the computer and told patient that he refused his medications and told staff \"I don't want anything from you people. \"  Patient is now requesting his medications. This nurse gave patient his medications. Patient grabbed his folder from the counter which he previously threw up there and stated Levy Patches is this doing up here? I thought I threw this away. \"  Patient walked away with folder, ripping it up into pieces and throwing it all over the floor in the day room. Patient returned to his room. Environmental rounds completed per unit policy to maintain safety of everyone on the unit. Staff will offer support and interventions as requested or required.

## 2018-09-06 NOTE — PROGRESS NOTES
Goals set   · Goals: Consume > 75% of most meals. · Monitoring: Meal Intake, Diet Tolerance, Weight, Comparative Standards, Pertinent Labs    See Adult Nutrition Doc Flowsheet for more detail.      Electronically signed by Jacquie Ramirez RD, LD on 9/6/18 at 12:42 PM    Contact Number:  Ext [phone]

## 2018-09-07 PROCEDURE — 99231 SBSQ HOSP IP/OBS SF/LOW 25: CPT | Performed by: PSYCHIATRY & NEUROLOGY

## 2018-09-07 PROCEDURE — 6370000000 HC RX 637 (ALT 250 FOR IP): Performed by: PSYCHIATRY & NEUROLOGY

## 2018-09-07 PROCEDURE — 6370000000 HC RX 637 (ALT 250 FOR IP): Performed by: INTERNAL MEDICINE

## 2018-09-07 PROCEDURE — 1240000000 HC EMOTIONAL WELLNESS R&B

## 2018-09-07 RX ORDER — ARIPIPRAZOLE 5 MG/1
5 TABLET ORAL EVERY EVENING
Status: DISCONTINUED | OUTPATIENT
Start: 2018-09-07 | End: 2018-09-08 | Stop reason: HOSPADM

## 2018-09-07 RX ADMIN — ASPIRIN 81 MG: 81 TABLET ORAL at 09:08

## 2018-09-07 RX ADMIN — TAMSULOSIN HYDROCHLORIDE 0.4 MG: 0.4 CAPSULE ORAL at 20:21

## 2018-09-07 RX ADMIN — MECLIZINE HCL 12.5 MG 25 MG: 12.5 TABLET ORAL at 09:08

## 2018-09-07 RX ADMIN — AMLODIPINE BESYLATE 10 MG: 10 TABLET ORAL at 09:08

## 2018-09-07 RX ADMIN — ARIPIPRAZOLE 5 MG: 5 TABLET ORAL at 17:58

## 2018-09-07 RX ADMIN — MECLIZINE HCL 12.5 MG 25 MG: 12.5 TABLET ORAL at 20:21

## 2018-09-07 RX ADMIN — VENLAFAXINE HYDROCHLORIDE 150 MG: 150 CAPSULE, EXTENDED RELEASE ORAL at 09:08

## 2018-09-07 RX ADMIN — ATORVASTATIN CALCIUM 40 MG: 40 TABLET, FILM COATED ORAL at 09:08

## 2018-09-07 ASSESSMENT — PAIN SCALES - GENERAL: PAINLEVEL_OUTOF10: 0

## 2018-09-07 NOTE — PROGRESS NOTES
Group Therapy Note    Date: 9/7/2018  Start Time: 210  End Time:  255  Number of Participants: 10    Type of Group: Cognitive Skills    Wellness Binder Information  Module Name:  How to express appreciate to others and apologize  Session Number:      Patient's Goal: Pt will be able to identify importance of expressing apology and gratitude in relationships. Notes:  Pt active in class discussion. Pt became tearful at end of class but didn't want to share in class. SW spoke with him after class and he expressed fear that dr was going to keep him forever here and he just wants to go home. SW provided support and shared that pt would probably be d/c early next week as pt is compliant with all groups/classes/and medications. Status After Intervention:  Improved    Participation Level:  Active Listener and Interactive    Participation Quality: Appropriate, Attentive, Sharing and Supportive      Speech:  normal      Thought Process/Content: Logical      Affective Functioning: Congruent      Mood: depressed      Level of consciousness:  Alert, Oriented x4 and Attentive      Response to Learning: Able to verbalize current knowledge/experience, Able to verbalize/acknowledge new learning and Progressing to goal      Endings: None Reported    Modes of Intervention: Education, Support, Socialization and Problem-solving      Discipline Responsible: /Counselor      Signature:  TAYLOR Cardenas

## 2018-09-07 NOTE — PROGRESS NOTES
Patient attended community meeting/morning stretch.  Attempted to have patient identify goal for the day but patient stated: \"No\"

## 2018-09-07 NOTE — PROGRESS NOTES
Group Therapy Note    Date: 9/7/2018  Start Time:1000  End Time:  2100  Number of Participants: 6    Type of Group: Psychoeducation    Wellness Binder Information  Module Name:  Dimensions of wellness   Session Number:  na    Patient's Goal: patient engaged in group, able to share what she is doing well and what needs improvement. Notes: sharing and engaged in group, willing to participate in group and supportive of others. Status After Intervention:  Improved    Participation Level:  Active Listener    Participation Quality: Appropriate and Attentive      Speech:  normal      Thought Process/Content: Logical      Affective Functioning: Congruent      Mood: euthymic      Level of consciousness:  Alert, Oriented x4 and Attentive      Response to Learning: Able to verbalize/acknowledge new learning, Able to retain information and Progressing to goal      Endings: None Reported    Modes of Intervention: Education, Support, Socialization and Problem-solving      Discipline Responsible: Psychoeducational Specialist      Signature:  Reyna Iraheta

## 2018-09-07 NOTE — PROGRESS NOTES
Group Therapy Note    Date: 9/7/2018  Start Time: 1110  End Time:  9846  Number of Participants: 6    Type of Group: Psychotherapy    Wellness Binder Information  Module Name:  n/a  Session Number:  n/a    Patient's Goal:  To increase socialization and improve communication with others. Notes: Pt active in group discussion focusing on importance of reaching out to others instead of pushing others away and ways to improve relationships. Pt shared personal issues and provides feedback and support to others. Status After Intervention:  Improved    Participation Level:  Active Listener and Interactive    Participation Quality: Appropriate, Attentive, Sharing and Supportive      Speech:  normal      Thought Process/Content: Logical      Affective Functioning: congruent      Mood: depressed      Level of consciousness:  Alert, Oriented x4 and Attentive      Response to Learning: Able to verbalize current knowledge/experience, Able to verbalize/acknowledge new learning and Progressing to goal      Endings: None Reported    Modes of Intervention: Support, Socialization, Exploration and Problem-solving      Discipline Responsible: /Counselor      Signature:  TAYLOR Dickens

## 2018-09-07 NOTE — CARE COORDINATION
LC Note: d/c planning: SW met with pt for assessment. [facility] had obtained answers from srn unit while he was there yesterday. Pt discussed stressors of parents dying 1 day apart in March. He also discussed recent irritability and fear dr will keep him here for a long time. SW provided support and encouraged referral to counseling and med management which pt agreed to. Pt states his children will drive him to an appt or he will take bus. Pt lives with his wife in [place] and plans on returning. Pt is close with his wife and family. We discussed referrals to [facility] and [facility] in [place]. LC called and left message for [place] [facility] re: intake appt. Pt has [address] and message left if they can accept his insurance. If not able to we will refer to [facility] on [address]

## 2018-09-08 VITALS
DIASTOLIC BLOOD PRESSURE: 82 MMHG | SYSTOLIC BLOOD PRESSURE: 123 MMHG | HEIGHT: 70 IN | BODY MASS INDEX: 26.05 KG/M2 | WEIGHT: 182 LBS | RESPIRATION RATE: 12 BRPM | HEART RATE: 75 BPM | OXYGEN SATURATION: 98 % | TEMPERATURE: 98.2 F

## 2018-09-08 PROCEDURE — 99238 HOSP IP/OBS DSCHRG MGMT 30/<: CPT | Performed by: PSYCHIATRY & NEUROLOGY

## 2018-09-08 PROCEDURE — 6370000000 HC RX 637 (ALT 250 FOR IP): Performed by: INTERNAL MEDICINE

## 2018-09-08 RX ORDER — VENLAFAXINE HYDROCHLORIDE 150 MG/1
150 CAPSULE, EXTENDED RELEASE ORAL DAILY
Qty: 30 CAPSULE | Refills: 3 | Status: SHIPPED | OUTPATIENT
Start: 2018-09-09 | End: 2018-10-30 | Stop reason: SDUPTHER

## 2018-09-08 RX ORDER — ARIPIPRAZOLE 5 MG/1
5 TABLET ORAL EVERY EVENING
Qty: 30 TABLET | Refills: 0 | Status: SHIPPED | OUTPATIENT
Start: 2018-09-08 | End: 2018-10-08 | Stop reason: SDUPTHER

## 2018-09-08 RX ADMIN — ATORVASTATIN CALCIUM 40 MG: 40 TABLET, FILM COATED ORAL at 08:37

## 2018-09-08 RX ADMIN — AMLODIPINE BESYLATE 10 MG: 10 TABLET ORAL at 09:26

## 2018-09-08 RX ADMIN — VENLAFAXINE HYDROCHLORIDE 150 MG: 150 CAPSULE, EXTENDED RELEASE ORAL at 08:37

## 2018-09-08 RX ADMIN — ASPIRIN 81 MG: 81 TABLET ORAL at 08:37

## 2018-09-08 ASSESSMENT — PAIN SCALES - GENERAL: PAINLEVEL_OUTOF10: 0

## 2018-09-08 NOTE — PROGRESS NOTES
Patient attended community meeting/morning stretch. Patient identified goal for the day as:  \"Get out of wheelchair\"
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
## 2018-09-08 NOTE — PROGRESS NOTES
[facility]  Discharge Note    Pt discharged with followings belongings:   Dentures: None  Vision - Corrective Lenses: Glasses  Hearing Aid: None  Jewelry: None  Body Piercings Removed: N/A  Clothing: Footwear, Pants, Shirt, Socks (1 pr shoes, 1 pr jeans, 2 shirts, 1 pr socks)  Were All Patient Medications Collected?: Not Applicable  Other Valuables: None   Valuables sent home with yes. Valuables retrieved from safe, Security envelope number:  n/a and returned to patient. Patient left department with Departure Mode: By self via Mobility at Departure: Ambulatory, discharged to Discharged to: Private Residence. Patient education on aftercare instructions: yes  Information faxed to n/a by n/a Patient verbalize understanding of AVS:  yes.     Status EXAM upon discharge:  Status and Exam  Normal: No  Facial Expression: Brightened, Exaggerated  Affect: Congruent  Level of Consciousness: Alert  Mood:Normal: No  Mood: Elated  Motor Activity:Normal: Yes  Motor Activity: Increased  Interview Behavior: Cooperative  Preception: [place] to Person, Arlyn Journey to Time, [place] to Place, [place] to Situation  Attention:Normal: Yes  Attention: Unable to Concentrate  Thought Processes: Circumstantial  Thought Content:Normal: Yes  Thought Content: Preoccupations  Hallucinations: None  Delusions: No  Memory:Normal: No  Memory: Poor Recent  Insight and Judgment: Yes  Insight and Judgment:  (improving)  Present Suicidal Ideation: No  Present Homicidal Ideation: No    Loyd Mistry RN

## 2018-09-14 ENCOUNTER — OFFICE VISIT (OUTPATIENT)
Dept: FAMILY MEDICINE CLINIC | Age: 68
End: 2018-09-14
Payer: MEDICARE

## 2018-09-14 VITALS
RESPIRATION RATE: 16 BRPM | HEIGHT: 71 IN | BODY MASS INDEX: 26.04 KG/M2 | WEIGHT: 186 LBS | DIASTOLIC BLOOD PRESSURE: 75 MMHG | HEART RATE: 62 BPM | OXYGEN SATURATION: 96 % | SYSTOLIC BLOOD PRESSURE: 119 MMHG

## 2018-09-14 DIAGNOSIS — Z87.828 HISTORY OF TRAUMATIC HEAD INJURY: Chronic | ICD-10-CM

## 2018-09-14 DIAGNOSIS — I10 ESSENTIAL (PRIMARY) HYPERTENSION: Chronic | ICD-10-CM

## 2018-09-14 DIAGNOSIS — F32.2 SEVERE MAJOR DEPRESSION WITHOUT PSYCHOTIC FEATURES (HCC): ICD-10-CM

## 2018-09-14 DIAGNOSIS — R91.1 INCIDENTAL PULMONARY NODULE, > 3MM AND < 8MM: ICD-10-CM

## 2018-09-14 DIAGNOSIS — R42 DIZZINESS: ICD-10-CM

## 2018-09-14 DIAGNOSIS — E11.9 TYPE 2 DIABETES MELLITUS WITHOUT COMPLICATION, WITHOUT LONG-TERM CURRENT USE OF INSULIN (HCC): Primary | ICD-10-CM

## 2018-09-14 PROCEDURE — 1036F TOBACCO NON-USER: CPT | Performed by: STUDENT IN AN ORGANIZED HEALTH CARE EDUCATION/TRAINING PROGRAM

## 2018-09-14 PROCEDURE — 1123F ACP DISCUSS/DSCN MKR DOCD: CPT | Performed by: STUDENT IN AN ORGANIZED HEALTH CARE EDUCATION/TRAINING PROGRAM

## 2018-09-14 PROCEDURE — 3044F HG A1C LEVEL LT 7.0%: CPT | Performed by: STUDENT IN AN ORGANIZED HEALTH CARE EDUCATION/TRAINING PROGRAM

## 2018-09-14 PROCEDURE — 2022F DILAT RTA XM EVC RTNOPTHY: CPT | Performed by: STUDENT IN AN ORGANIZED HEALTH CARE EDUCATION/TRAINING PROGRAM

## 2018-09-14 PROCEDURE — G8419 CALC BMI OUT NRM PARAM NOF/U: HCPCS | Performed by: STUDENT IN AN ORGANIZED HEALTH CARE EDUCATION/TRAINING PROGRAM

## 2018-09-14 PROCEDURE — 4040F PNEUMOC VAC/ADMIN/RCVD: CPT | Performed by: STUDENT IN AN ORGANIZED HEALTH CARE EDUCATION/TRAINING PROGRAM

## 2018-09-14 PROCEDURE — G8598 ASA/ANTIPLAT THER USED: HCPCS | Performed by: STUDENT IN AN ORGANIZED HEALTH CARE EDUCATION/TRAINING PROGRAM

## 2018-09-14 PROCEDURE — 1101F PT FALLS ASSESS-DOCD LE1/YR: CPT | Performed by: STUDENT IN AN ORGANIZED HEALTH CARE EDUCATION/TRAINING PROGRAM

## 2018-09-14 PROCEDURE — 1111F DSCHRG MED/CURRENT MED MERGE: CPT | Performed by: STUDENT IN AN ORGANIZED HEALTH CARE EDUCATION/TRAINING PROGRAM

## 2018-09-14 PROCEDURE — 99213 OFFICE O/P EST LOW 20 MIN: CPT | Performed by: STUDENT IN AN ORGANIZED HEALTH CARE EDUCATION/TRAINING PROGRAM

## 2018-09-14 PROCEDURE — 3017F COLORECTAL CA SCREEN DOC REV: CPT | Performed by: STUDENT IN AN ORGANIZED HEALTH CARE EDUCATION/TRAINING PROGRAM

## 2018-09-14 PROCEDURE — G8427 DOCREV CUR MEDS BY ELIG CLIN: HCPCS | Performed by: STUDENT IN AN ORGANIZED HEALTH CARE EDUCATION/TRAINING PROGRAM

## 2018-09-14 RX ORDER — LANCETS 30 GAUGE
EACH MISCELLANEOUS
Qty: 100 EACH | Refills: 3 | Status: SHIPPED | OUTPATIENT
Start: 2018-09-14

## 2018-09-14 RX ORDER — GLUCOSAMINE HCL/CHONDROITIN SU 500-400 MG
CAPSULE ORAL
Qty: 100 STRIP | Refills: 0 | Status: SHIPPED | OUTPATIENT
Start: 2018-09-14 | End: 2019-01-14 | Stop reason: SDUPTHER

## 2018-09-14 RX ORDER — MECLIZINE HYDROCHLORIDE 25 MG/1
25 TABLET ORAL 3 TIMES DAILY PRN
COMMUNITY
End: 2019-03-05

## 2018-09-14 ASSESSMENT — ENCOUNTER SYMPTOMS
BACK PAIN: 0
NAUSEA: 0
ABDOMINAL PAIN: 1
PHOTOPHOBIA: 1
SINUS PAIN: 0
WHEEZING: 0
BLURRED VISION: 1
VOMITING: 0
SHORTNESS OF BREATH: 0
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
## 2018-09-18 ENCOUNTER — TELEPHONE (OUTPATIENT)
Dept: FAMILY MEDICINE CLINIC | Age: 68
End: 2018-09-18

## 2018-09-18 DIAGNOSIS — E11.9 TYPE 2 DIABETES MELLITUS WITHOUT COMPLICATION, WITHOUT LONG-TERM CURRENT USE OF INSULIN (HCC): Primary | ICD-10-CM

## 2018-09-18 NOTE — TELEPHONE ENCOUNTER
Can you please place opthalmology referral to [facility] for insurance purposes. Thank you.

## 2018-09-19 NOTE — TELEPHONE ENCOUNTER
Can you place the referral to Dr. Ben Qiu.  Ashley Rolon @ [address] [place] [address] Dr Rod - @ [facility]

## 2018-09-21 ENCOUNTER — TELEPHONE (OUTPATIENT)
Dept: FAMILY MEDICINE CLINIC | Age: 68
End: 2018-09-21

## 2018-09-21 NOTE — TELEPHONE ENCOUNTER
Aidan Tompkins DO at 9/21/2018  8:10 AM     Status: Signed      Please call Lucas Sanchez and let him know we need to order a CT scan of his chest to further evaluate his lungs due to his smoking history. Thanks!

## 2018-09-28 ENCOUNTER — TELEPHONE (OUTPATIENT)
Dept: FAMILY MEDICINE CLINIC | Age: 68
End: 2018-09-28

## 2018-10-05 PROBLEM — E11.9 TYPE 2 DIABETES MELLITUS WITHOUT COMPLICATION, WITHOUT LONG-TERM CURRENT USE OF INSULIN (HCC): Status: ACTIVE | Noted: 2018-10-05

## 2018-10-08 DIAGNOSIS — F31.60 MIXED BIPOLAR I DISORDER (HCC): Primary | ICD-10-CM

## 2018-10-09 RX ORDER — ARIPIPRAZOLE 5 MG/1
5 TABLET ORAL EVERY EVENING
Qty: 30 TABLET | Refills: 0 | Status: SHIPPED | OUTPATIENT
Start: 2018-10-09 | End: 2018-10-30 | Stop reason: SDUPTHER

## 2018-10-18 NOTE — TELEPHONE ENCOUNTER
Patient would like a call back in regards to this CT order. Said that they do not understand why he needs another CT when he just had a one done.   Would like to speak to

## 2018-10-18 NOTE — TELEPHONE ENCOUNTER
Called them back and explained why the scan is important since we found lung nodules on a CT head, but that that scan didn't give a full picture of his lungs. They expressed concern that they can't afford the payment associated with additional testing right now. Will discuss further at visit next week.

## 2018-10-30 ENCOUNTER — OFFICE VISIT (OUTPATIENT)
Dept: FAMILY MEDICINE CLINIC | Age: 68
End: 2018-10-30
Payer: MEDICARE

## 2018-10-30 VITALS
HEART RATE: 76 BPM | BODY MASS INDEX: 26.32 KG/M2 | SYSTOLIC BLOOD PRESSURE: 108 MMHG | DIASTOLIC BLOOD PRESSURE: 72 MMHG | WEIGHT: 188 LBS | HEIGHT: 71 IN | RESPIRATION RATE: 18 BRPM | OXYGEN SATURATION: 98 %

## 2018-10-30 DIAGNOSIS — R05.9 COUGH: ICD-10-CM

## 2018-10-30 DIAGNOSIS — R35.0 FREQUENCY OF URINATION: ICD-10-CM

## 2018-10-30 DIAGNOSIS — E78.2 MIXED HYPERLIPIDEMIA: ICD-10-CM

## 2018-10-30 DIAGNOSIS — Z23 NEED FOR INFLUENZA VACCINATION: ICD-10-CM

## 2018-10-30 DIAGNOSIS — R91.1 INCIDENTAL PULMONARY NODULE, > 3MM AND < 8MM: ICD-10-CM

## 2018-10-30 DIAGNOSIS — K21.9 GASTROESOPHAGEAL REFLUX DISEASE WITHOUT ESOPHAGITIS: ICD-10-CM

## 2018-10-30 DIAGNOSIS — F31.60 MIXED BIPOLAR I DISORDER (HCC): ICD-10-CM

## 2018-10-30 DIAGNOSIS — Z11.59 NEED FOR HEPATITIS C SCREENING TEST: ICD-10-CM

## 2018-10-30 DIAGNOSIS — E11.9 TYPE 2 DIABETES MELLITUS WITHOUT COMPLICATION, WITHOUT LONG-TERM CURRENT USE OF INSULIN (HCC): Primary | ICD-10-CM

## 2018-10-30 DIAGNOSIS — I10 ESSENTIAL (PRIMARY) HYPERTENSION: ICD-10-CM

## 2018-10-30 PROCEDURE — 3044F HG A1C LEVEL LT 7.0%: CPT | Performed by: STUDENT IN AN ORGANIZED HEALTH CARE EDUCATION/TRAINING PROGRAM

## 2018-10-30 PROCEDURE — G0008 ADMIN INFLUENZA VIRUS VAC: HCPCS | Performed by: STUDENT IN AN ORGANIZED HEALTH CARE EDUCATION/TRAINING PROGRAM

## 2018-10-30 PROCEDURE — G8482 FLU IMMUNIZE ORDER/ADMIN: HCPCS | Performed by: STUDENT IN AN ORGANIZED HEALTH CARE EDUCATION/TRAINING PROGRAM

## 2018-10-30 PROCEDURE — 90662 IIV NO PRSV INCREASED AG IM: CPT | Performed by: STUDENT IN AN ORGANIZED HEALTH CARE EDUCATION/TRAINING PROGRAM

## 2018-10-30 PROCEDURE — 2022F DILAT RTA XM EVC RTNOPTHY: CPT | Performed by: STUDENT IN AN ORGANIZED HEALTH CARE EDUCATION/TRAINING PROGRAM

## 2018-10-30 PROCEDURE — 1123F ACP DISCUSS/DSCN MKR DOCD: CPT | Performed by: STUDENT IN AN ORGANIZED HEALTH CARE EDUCATION/TRAINING PROGRAM

## 2018-10-30 PROCEDURE — 4040F PNEUMOC VAC/ADMIN/RCVD: CPT | Performed by: STUDENT IN AN ORGANIZED HEALTH CARE EDUCATION/TRAINING PROGRAM

## 2018-10-30 PROCEDURE — G8598 ASA/ANTIPLAT THER USED: HCPCS | Performed by: STUDENT IN AN ORGANIZED HEALTH CARE EDUCATION/TRAINING PROGRAM

## 2018-10-30 PROCEDURE — G8427 DOCREV CUR MEDS BY ELIG CLIN: HCPCS | Performed by: STUDENT IN AN ORGANIZED HEALTH CARE EDUCATION/TRAINING PROGRAM

## 2018-10-30 PROCEDURE — 1101F PT FALLS ASSESS-DOCD LE1/YR: CPT | Performed by: STUDENT IN AN ORGANIZED HEALTH CARE EDUCATION/TRAINING PROGRAM

## 2018-10-30 PROCEDURE — 3017F COLORECTAL CA SCREEN DOC REV: CPT | Performed by: STUDENT IN AN ORGANIZED HEALTH CARE EDUCATION/TRAINING PROGRAM

## 2018-10-30 PROCEDURE — G8419 CALC BMI OUT NRM PARAM NOF/U: HCPCS | Performed by: STUDENT IN AN ORGANIZED HEALTH CARE EDUCATION/TRAINING PROGRAM

## 2018-10-30 PROCEDURE — 99213 OFFICE O/P EST LOW 20 MIN: CPT | Performed by: STUDENT IN AN ORGANIZED HEALTH CARE EDUCATION/TRAINING PROGRAM

## 2018-10-30 PROCEDURE — 1036F TOBACCO NON-USER: CPT | Performed by: STUDENT IN AN ORGANIZED HEALTH CARE EDUCATION/TRAINING PROGRAM

## 2018-10-30 RX ORDER — TAMSULOSIN HYDROCHLORIDE 0.4 MG/1
0.4 CAPSULE ORAL NIGHTLY
Qty: 90 CAPSULE | Refills: 0 | Status: SHIPPED | OUTPATIENT
Start: 2018-10-30 | End: 2019-01-14 | Stop reason: SDUPTHER

## 2018-10-30 RX ORDER — ARIPIPRAZOLE 5 MG/1
5 TABLET ORAL EVERY EVENING
Qty: 90 TABLET | Refills: 0 | Status: SHIPPED | OUTPATIENT
Start: 2018-10-30 | End: 2019-01-14 | Stop reason: SDUPTHER

## 2018-10-30 RX ORDER — VENLAFAXINE HYDROCHLORIDE 150 MG/1
150 CAPSULE, EXTENDED RELEASE ORAL DAILY
Qty: 90 CAPSULE | Refills: 0 | Status: SHIPPED | OUTPATIENT
Start: 2018-10-30 | End: 2019-01-14 | Stop reason: SDUPTHER

## 2018-10-30 RX ORDER — ALBUTEROL SULFATE 90 UG/1
2 AEROSOL, METERED RESPIRATORY (INHALATION) EVERY 6 HOURS PRN
Qty: 1 INHALER | Refills: 3 | Status: SHIPPED | OUTPATIENT
Start: 2018-10-30 | End: 2019-01-14 | Stop reason: SDUPTHER

## 2018-10-30 RX ORDER — GLUCOSAMINE HCL/CHONDROITIN SU 500-400 MG
CAPSULE ORAL
Qty: 100 STRIP | Refills: 3 | Status: CANCELLED | OUTPATIENT
Start: 2018-10-30

## 2018-10-30 RX ORDER — ATORVASTATIN CALCIUM 10 MG/1
20 TABLET, FILM COATED ORAL DAILY
Qty: 90 TABLET | Refills: 0 | Status: SHIPPED | OUTPATIENT
Start: 2018-10-30 | End: 2018-12-12 | Stop reason: SDUPTHER

## 2018-10-30 RX ORDER — OMEPRAZOLE 20 MG/1
20 CAPSULE, DELAYED RELEASE ORAL 2 TIMES DAILY
Qty: 180 CAPSULE | Refills: 0 | Status: SHIPPED | OUTPATIENT
Start: 2018-10-30 | End: 2019-01-14 | Stop reason: SDUPTHER

## 2018-10-30 RX ORDER — AMLODIPINE BESYLATE 10 MG/1
10 TABLET ORAL DAILY
Qty: 90 TABLET | Refills: 0 | Status: SHIPPED | OUTPATIENT
Start: 2018-10-30 | End: 2019-01-14 | Stop reason: SDUPTHER

## 2018-10-30 ASSESSMENT — ENCOUNTER SYMPTOMS
NAUSEA: 0
BACK PAIN: 0
CONSTIPATION: 0
SORE THROAT: 0
SHORTNESS OF BREATH: 0
DIARRHEA: 0
VOMITING: 0
SINUS PAIN: 0
BLOOD IN STOOL: 0
WHEEZING: 0
COUGH: 1
ABDOMINAL PAIN: 1
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
## 2018-10-30 NOTE — PROGRESS NOTES
Vaccine Information Sheet, \"Influenza - Inactivated\"  given to Frank Boss, or parent/legal guardian of  Frank Boss and verbalized understanding. Patient responses:    Have you ever had a reaction to a flu vaccine? No  Are you able to eat eggs without adverse effects? Yes  Do you have any current illness? No  Have you ever had Guillian [place] Syndrome? No    Flu vaccine given per order. Please see immunization tab.

## 2018-12-11 DIAGNOSIS — E78.2 MIXED HYPERLIPIDEMIA: ICD-10-CM

## 2018-12-12 RX ORDER — ATORVASTATIN CALCIUM 20 MG/1
20 TABLET, FILM COATED ORAL DAILY
Qty: 90 TABLET | Refills: 1 | Status: SHIPPED | OUTPATIENT
Start: 2018-12-12 | End: 2019-03-11 | Stop reason: SDUPTHER

## 2018-12-12 RX ORDER — ATORVASTATIN CALCIUM 10 MG/1
20 TABLET, FILM COATED ORAL DAILY
Qty: 90 TABLET | Refills: 0 | Status: SHIPPED | OUTPATIENT
Start: 2018-12-12 | End: 2018-12-12 | Stop reason: SDUPTHER

## 2019-01-14 DIAGNOSIS — K21.9 GASTROESOPHAGEAL REFLUX DISEASE WITHOUT ESOPHAGITIS: ICD-10-CM

## 2019-01-14 DIAGNOSIS — R35.0 FREQUENCY OF URINATION: ICD-10-CM

## 2019-01-14 DIAGNOSIS — I10 ESSENTIAL (PRIMARY) HYPERTENSION: ICD-10-CM

## 2019-01-14 DIAGNOSIS — R05.9 COUGH: ICD-10-CM

## 2019-01-14 DIAGNOSIS — F31.60 MIXED BIPOLAR I DISORDER (HCC): ICD-10-CM

## 2019-01-14 DIAGNOSIS — E11.9 TYPE 2 DIABETES MELLITUS WITHOUT COMPLICATION, WITHOUT LONG-TERM CURRENT USE OF INSULIN (HCC): ICD-10-CM

## 2019-01-16 ENCOUNTER — HOSPITAL ENCOUNTER (OUTPATIENT)
Dept: CT IMAGING | Age: 69
Discharge: HOME | End: 2019-01-18
Payer: MEDICARE

## 2019-01-16 PROCEDURE — 71250 CT THORAX DX C-: CPT

## 2019-01-16 RX ORDER — ARIPIPRAZOLE 5 MG/1
5 TABLET ORAL EVERY EVENING
Qty: 90 TABLET | Refills: 0 | Status: SHIPPED | OUTPATIENT
Start: 2019-01-16 | End: 2019-05-03 | Stop reason: SDUPTHER

## 2019-01-16 RX ORDER — GLUCOSAMINE HCL/CHONDROITIN SU 500-400 MG
CAPSULE ORAL
Qty: 100 STRIP | Refills: 1 | Status: SHIPPED | OUTPATIENT
Start: 2019-01-16 | End: 2019-02-27 | Stop reason: SDUPTHER

## 2019-01-16 RX ORDER — TAMSULOSIN HYDROCHLORIDE 0.4 MG/1
0.4 CAPSULE ORAL NIGHTLY
Qty: 90 CAPSULE | Refills: 0 | Status: SHIPPED | OUTPATIENT
Start: 2019-01-16 | End: 2019-04-22 | Stop reason: SDUPTHER

## 2019-01-16 RX ORDER — OMEPRAZOLE 20 MG/1
20 CAPSULE, DELAYED RELEASE ORAL 2 TIMES DAILY
Qty: 180 CAPSULE | Refills: 0 | Status: SHIPPED | OUTPATIENT
Start: 2019-01-16 | End: 2019-02-27 | Stop reason: SDUPTHER

## 2019-01-16 RX ORDER — AMLODIPINE BESYLATE 10 MG/1
10 TABLET ORAL DAILY
Qty: 90 TABLET | Refills: 0 | Status: SHIPPED | OUTPATIENT
Start: 2019-01-16 | End: 2019-03-11 | Stop reason: SDUPTHER

## 2019-01-16 RX ORDER — VENLAFAXINE HYDROCHLORIDE 150 MG/1
150 CAPSULE, EXTENDED RELEASE ORAL DAILY
Qty: 90 CAPSULE | Refills: 0 | Status: SHIPPED | OUTPATIENT
Start: 2019-01-16 | End: 2019-02-27 | Stop reason: SDUPTHER

## 2019-01-16 RX ORDER — ALBUTEROL SULFATE 90 UG/1
2 AEROSOL, METERED RESPIRATORY (INHALATION) EVERY 6 HOURS PRN
Qty: 1 INHALER | Refills: 3 | Status: SHIPPED | OUTPATIENT
Start: 2019-01-16 | End: 2019-04-05 | Stop reason: SDUPTHER

## 2019-01-17 ENCOUNTER — TELEPHONE (OUTPATIENT)
Dept: FAMILY MEDICINE CLINIC | Age: 69
End: 2019-01-17

## 2019-01-28 ENCOUNTER — TELEPHONE (OUTPATIENT)
Dept: FAMILY MEDICINE CLINIC | Age: 69
End: 2019-01-28

## 2019-02-27 DIAGNOSIS — K21.9 GASTROESOPHAGEAL REFLUX DISEASE WITHOUT ESOPHAGITIS: ICD-10-CM

## 2019-02-27 DIAGNOSIS — E11.9 TYPE 2 DIABETES MELLITUS WITHOUT COMPLICATION, WITHOUT LONG-TERM CURRENT USE OF INSULIN (HCC): ICD-10-CM

## 2019-02-27 DIAGNOSIS — F31.60 MIXED BIPOLAR I DISORDER (HCC): ICD-10-CM

## 2019-02-27 RX ORDER — VENLAFAXINE HYDROCHLORIDE 150 MG/1
150 CAPSULE, EXTENDED RELEASE ORAL DAILY
Qty: 90 CAPSULE | Refills: 0 | Status: SHIPPED | OUTPATIENT
Start: 2019-02-27 | End: 2019-05-30 | Stop reason: SDUPTHER

## 2019-02-27 RX ORDER — OMEPRAZOLE 20 MG/1
20 CAPSULE, DELAYED RELEASE ORAL 2 TIMES DAILY
Qty: 180 CAPSULE | Refills: 0 | Status: SHIPPED | OUTPATIENT
Start: 2019-02-27 | End: 2019-05-30 | Stop reason: SDUPTHER

## 2019-02-27 RX ORDER — GLUCOSAMINE HCL/CHONDROITIN SU 500-400 MG
CAPSULE ORAL
Qty: 100 STRIP | Refills: 1 | Status: SHIPPED
Start: 2019-02-27 | End: 2020-08-11 | Stop reason: SDUPTHER

## 2019-03-05 ENCOUNTER — OFFICE VISIT (OUTPATIENT)
Dept: FAMILY MEDICINE CLINIC | Age: 69
End: 2019-03-05
Payer: MEDICARE

## 2019-03-05 VITALS
HEART RATE: 77 BPM | WEIGHT: 192 LBS | HEIGHT: 71 IN | BODY MASS INDEX: 26.88 KG/M2 | OXYGEN SATURATION: 95 % | RESPIRATION RATE: 16 BRPM | SYSTOLIC BLOOD PRESSURE: 122 MMHG | DIASTOLIC BLOOD PRESSURE: 76 MMHG

## 2019-03-05 DIAGNOSIS — J44.1 COPD EXACERBATION (HCC): Primary | ICD-10-CM

## 2019-03-05 DIAGNOSIS — F41.9 ANXIETY: ICD-10-CM

## 2019-03-05 DIAGNOSIS — J43.9 PULMONARY EMPHYSEMA, UNSPECIFIED EMPHYSEMA TYPE (HCC): ICD-10-CM

## 2019-03-05 PROCEDURE — G8482 FLU IMMUNIZE ORDER/ADMIN: HCPCS | Performed by: STUDENT IN AN ORGANIZED HEALTH CARE EDUCATION/TRAINING PROGRAM

## 2019-03-05 PROCEDURE — G8926 SPIRO NO PERF OR DOC: HCPCS | Performed by: STUDENT IN AN ORGANIZED HEALTH CARE EDUCATION/TRAINING PROGRAM

## 2019-03-05 PROCEDURE — G8427 DOCREV CUR MEDS BY ELIG CLIN: HCPCS | Performed by: STUDENT IN AN ORGANIZED HEALTH CARE EDUCATION/TRAINING PROGRAM

## 2019-03-05 PROCEDURE — 1123F ACP DISCUSS/DSCN MKR DOCD: CPT | Performed by: STUDENT IN AN ORGANIZED HEALTH CARE EDUCATION/TRAINING PROGRAM

## 2019-03-05 PROCEDURE — G8419 CALC BMI OUT NRM PARAM NOF/U: HCPCS | Performed by: STUDENT IN AN ORGANIZED HEALTH CARE EDUCATION/TRAINING PROGRAM

## 2019-03-05 PROCEDURE — 4040F PNEUMOC VAC/ADMIN/RCVD: CPT | Performed by: STUDENT IN AN ORGANIZED HEALTH CARE EDUCATION/TRAINING PROGRAM

## 2019-03-05 PROCEDURE — 3023F SPIROM DOC REV: CPT | Performed by: STUDENT IN AN ORGANIZED HEALTH CARE EDUCATION/TRAINING PROGRAM

## 2019-03-05 PROCEDURE — 99213 OFFICE O/P EST LOW 20 MIN: CPT | Performed by: STUDENT IN AN ORGANIZED HEALTH CARE EDUCATION/TRAINING PROGRAM

## 2019-03-05 PROCEDURE — G8598 ASA/ANTIPLAT THER USED: HCPCS | Performed by: STUDENT IN AN ORGANIZED HEALTH CARE EDUCATION/TRAINING PROGRAM

## 2019-03-05 PROCEDURE — 1036F TOBACCO NON-USER: CPT | Performed by: STUDENT IN AN ORGANIZED HEALTH CARE EDUCATION/TRAINING PROGRAM

## 2019-03-05 PROCEDURE — 3017F COLORECTAL CA SCREEN DOC REV: CPT | Performed by: STUDENT IN AN ORGANIZED HEALTH CARE EDUCATION/TRAINING PROGRAM

## 2019-03-05 PROCEDURE — 1101F PT FALLS ASSESS-DOCD LE1/YR: CPT | Performed by: STUDENT IN AN ORGANIZED HEALTH CARE EDUCATION/TRAINING PROGRAM

## 2019-03-05 RX ORDER — BUDESONIDE AND FORMOTEROL FUMARATE DIHYDRATE 80; 4.5 UG/1; UG/1
2 AEROSOL RESPIRATORY (INHALATION) 2 TIMES DAILY
Qty: 1 INHALER | Refills: 3 | Status: SHIPPED | OUTPATIENT
Start: 2019-03-05 | End: 2019-07-19 | Stop reason: SDUPTHER

## 2019-03-05 RX ORDER — PREDNISONE 10 MG/1
10 TABLET ORAL 2 TIMES DAILY
Qty: 10 TABLET | Refills: 0 | Status: SHIPPED | OUTPATIENT
Start: 2019-03-05 | End: 2019-03-10

## 2019-03-05 RX ORDER — DOXYCYCLINE HYCLATE 100 MG
100 TABLET ORAL 2 TIMES DAILY
Qty: 14 TABLET | Refills: 0 | Status: SHIPPED | OUTPATIENT
Start: 2019-03-05 | End: 2019-03-12

## 2019-03-05 ASSESSMENT — ENCOUNTER SYMPTOMS
ABDOMINAL PAIN: 0
SHORTNESS OF BREATH: 1
EYE REDNESS: 1
SINUS PRESSURE: 0
WHEEZING: 0
SORE THROAT: 0
BLOOD IN STOOL: 0
COLOR CHANGE: 0
DIARRHEA: 0
COUGH: 1

## 2019-04-05 DIAGNOSIS — R05.9 COUGH: ICD-10-CM

## 2019-04-05 RX ORDER — ALBUTEROL SULFATE 90 UG/1
2 AEROSOL, METERED RESPIRATORY (INHALATION) EVERY 6 HOURS PRN
Qty: 1 INHALER | Refills: 3 | Status: SHIPPED | OUTPATIENT
Start: 2019-04-05 | End: 2019-05-03 | Stop reason: SDUPTHER

## 2019-04-22 DIAGNOSIS — R35.0 FREQUENCY OF URINATION: ICD-10-CM

## 2019-04-22 RX ORDER — TAMSULOSIN HYDROCHLORIDE 0.4 MG/1
0.4 CAPSULE ORAL NIGHTLY
Qty: 90 CAPSULE | Refills: 3 | Status: SHIPPED | OUTPATIENT
Start: 2019-04-22 | End: 2019-07-31 | Stop reason: SDUPTHER

## 2019-05-03 DIAGNOSIS — R05.9 COUGH: ICD-10-CM

## 2019-05-03 DIAGNOSIS — F31.60 MIXED BIPOLAR I DISORDER (HCC): ICD-10-CM

## 2019-05-03 RX ORDER — ALBUTEROL SULFATE 90 UG/1
2 AEROSOL, METERED RESPIRATORY (INHALATION) EVERY 6 HOURS PRN
Qty: 1 INHALER | Refills: 3 | Status: SHIPPED | OUTPATIENT
Start: 2019-05-03 | End: 2019-07-30 | Stop reason: SDUPTHER

## 2019-05-03 RX ORDER — ARIPIPRAZOLE 5 MG/1
5 TABLET ORAL EVERY EVENING
Qty: 90 TABLET | Refills: 3 | Status: SHIPPED | OUTPATIENT
Start: 2019-05-03 | End: 2019-07-31 | Stop reason: SDUPTHER

## 2019-05-10 DIAGNOSIS — E11.9 TYPE 2 DIABETES MELLITUS WITHOUT COMPLICATION, WITHOUT LONG-TERM CURRENT USE OF INSULIN (HCC): Primary | ICD-10-CM

## 2019-06-03 ENCOUNTER — OFFICE VISIT (OUTPATIENT)
Dept: FAMILY MEDICINE CLINIC | Age: 69
End: 2019-06-03
Payer: MEDICARE

## 2019-06-03 ENCOUNTER — HOSPITAL ENCOUNTER (OUTPATIENT)
Age: 69
Discharge: HOME | End: 2019-06-05
Payer: MEDICARE

## 2019-06-03 VITALS
RESPIRATION RATE: 18 BRPM | HEIGHT: 72 IN | DIASTOLIC BLOOD PRESSURE: 74 MMHG | HEART RATE: 54 BPM | OXYGEN SATURATION: 94 % | BODY MASS INDEX: 25.19 KG/M2 | SYSTOLIC BLOOD PRESSURE: 124 MMHG | WEIGHT: 186 LBS

## 2019-06-03 DIAGNOSIS — Z12.11 COLON CANCER SCREENING: ICD-10-CM

## 2019-06-03 DIAGNOSIS — L72.9 CYST OF SKIN: ICD-10-CM

## 2019-06-03 DIAGNOSIS — J44.9 CHRONIC OBSTRUCTIVE PULMONARY DISEASE, UNSPECIFIED COPD TYPE (HCC): Chronic | ICD-10-CM

## 2019-06-03 DIAGNOSIS — E11.9 TYPE 2 DIABETES MELLITUS WITHOUT COMPLICATION, WITHOUT LONG-TERM CURRENT USE OF INSULIN (HCC): Primary | Chronic | ICD-10-CM

## 2019-06-03 DIAGNOSIS — Z11.59 ENCOUNTER FOR HEPATITIS C SCREENING TEST FOR LOW RISK PATIENT: ICD-10-CM

## 2019-06-03 PROBLEM — R42 DIZZINESS: Chronic | Status: RESOLVED | Noted: 2018-09-14 | Resolved: 2019-06-03

## 2019-06-03 PROBLEM — R27.0 ATAXIA: Status: RESOLVED | Noted: 2018-08-30 | Resolved: 2019-06-03

## 2019-06-03 PROBLEM — F32.2 SEVERE MAJOR DEPRESSION WITHOUT PSYCHOTIC FEATURES (HCC): Status: RESOLVED | Noted: 2018-09-05 | Resolved: 2019-06-03

## 2019-06-03 PROBLEM — F31.60 MIXED BIPOLAR I DISORDER (HCC): Status: RESOLVED | Noted: 2018-09-06 | Resolved: 2019-06-03

## 2019-06-03 LAB
CREATININE URINE POCT: ABNORMAL
HBA1C MFR BLD: 5.8 %
MICROALBUMIN/CREAT 24H UR: ABNORMAL MG/G{CREAT}
MICROALBUMIN/CREAT UR-RTO: ABNORMAL

## 2019-06-03 PROCEDURE — 3044F HG A1C LEVEL LT 7.0%: CPT | Performed by: STUDENT IN AN ORGANIZED HEALTH CARE EDUCATION/TRAINING PROGRAM

## 2019-06-03 PROCEDURE — G8419 CALC BMI OUT NRM PARAM NOF/U: HCPCS | Performed by: STUDENT IN AN ORGANIZED HEALTH CARE EDUCATION/TRAINING PROGRAM

## 2019-06-03 PROCEDURE — 1036F TOBACCO NON-USER: CPT | Performed by: STUDENT IN AN ORGANIZED HEALTH CARE EDUCATION/TRAINING PROGRAM

## 2019-06-03 PROCEDURE — 2022F DILAT RTA XM EVC RTNOPTHY: CPT | Performed by: STUDENT IN AN ORGANIZED HEALTH CARE EDUCATION/TRAINING PROGRAM

## 2019-06-03 PROCEDURE — 83036 HEMOGLOBIN GLYCOSYLATED A1C: CPT | Performed by: STUDENT IN AN ORGANIZED HEALTH CARE EDUCATION/TRAINING PROGRAM

## 2019-06-03 PROCEDURE — 86803 HEPATITIS C AB TEST: CPT

## 2019-06-03 PROCEDURE — 82044 UR ALBUMIN SEMIQUANTITATIVE: CPT | Performed by: STUDENT IN AN ORGANIZED HEALTH CARE EDUCATION/TRAINING PROGRAM

## 2019-06-03 PROCEDURE — 1123F ACP DISCUSS/DSCN MKR DOCD: CPT | Performed by: STUDENT IN AN ORGANIZED HEALTH CARE EDUCATION/TRAINING PROGRAM

## 2019-06-03 PROCEDURE — 4040F PNEUMOC VAC/ADMIN/RCVD: CPT | Performed by: STUDENT IN AN ORGANIZED HEALTH CARE EDUCATION/TRAINING PROGRAM

## 2019-06-03 PROCEDURE — G8598 ASA/ANTIPLAT THER USED: HCPCS | Performed by: STUDENT IN AN ORGANIZED HEALTH CARE EDUCATION/TRAINING PROGRAM

## 2019-06-03 PROCEDURE — 3023F SPIROM DOC REV: CPT | Performed by: STUDENT IN AN ORGANIZED HEALTH CARE EDUCATION/TRAINING PROGRAM

## 2019-06-03 PROCEDURE — G8926 SPIRO NO PERF OR DOC: HCPCS | Performed by: STUDENT IN AN ORGANIZED HEALTH CARE EDUCATION/TRAINING PROGRAM

## 2019-06-03 PROCEDURE — 3017F COLORECTAL CA SCREEN DOC REV: CPT | Performed by: STUDENT IN AN ORGANIZED HEALTH CARE EDUCATION/TRAINING PROGRAM

## 2019-06-03 PROCEDURE — G8427 DOCREV CUR MEDS BY ELIG CLIN: HCPCS | Performed by: STUDENT IN AN ORGANIZED HEALTH CARE EDUCATION/TRAINING PROGRAM

## 2019-06-03 PROCEDURE — 99213 OFFICE O/P EST LOW 20 MIN: CPT | Performed by: STUDENT IN AN ORGANIZED HEALTH CARE EDUCATION/TRAINING PROGRAM

## 2019-06-03 RX ORDER — METFORMIN HYDROCHLORIDE 500 MG/1
500 TABLET, EXTENDED RELEASE ORAL
Qty: 30 TABLET | Refills: 5 | Status: SHIPPED | OUTPATIENT
Start: 2019-06-03 | End: 2019-07-30 | Stop reason: SDUPTHER

## 2019-06-03 ASSESSMENT — ENCOUNTER SYMPTOMS
EYE PAIN: 0
DIARRHEA: 0
WHEEZING: 0
CHEST TIGHTNESS: 0
ABDOMINAL PAIN: 0
CONSTIPATION: 0
COUGH: 1
VOMITING: 0
NAUSEA: 0
SINUS PRESSURE: 0
SHORTNESS OF BREATH: 0

## 2019-06-03 NOTE — PROGRESS NOTES
[facility]  Precepting Note    Subjective:  F/u of DM2, COPD, anxiety  Blood sugar readings are running low  Compliant with meds    Breathing is better  Trying to quit smoking    ROS otherwise negative     Past medical, surgical, family and social history were reviewed, non-contributory, and unchanged unless otherwise stated. Objective:    /74   Pulse 54   Resp 18   Ht 6' (1.829 m)   Wt 186 lb (84.4 kg)   SpO2 94%   BMI 25.23 kg/m²     Exam is as noted by resident with the following changes, additions or corrections:    General:  NAD; alert & oriented x 3   Skin; cystic lesion on face and back  Heart:  RRR, no murmurs, gallops, or rubs. Lungs:  CTA bilaterally, no wheeze, rales or rhonchi  Abd: bowel sounds present, nontender, nondistended, no masses  Extrem:  No clubbing, cyanosis, or edema    Assessment/Plan:  Dm2  COPD  Anxiety    Decrease dose of Metformin to once a day and XL  Monitor blood sugars  HM: update  Cyst; can offer excision     Attending Physician Statement  I have reviewed the chart, including any radiology or labs. I have discussed the case, including pertinent history and exam findings with the resident. I agree with the assessment, plan and orders as documented by the resident. Please refer to the resident note for additional information.       Electronically signed by Mervat Almazan MD on 6/3/2019 at 3:14 PM
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
## 2019-06-04 LAB — HEPATITIS C ANTIBODY INTERPRETATION: NORMAL

## 2019-07-18 DIAGNOSIS — J44.1 COPD EXACERBATION (HCC): ICD-10-CM

## 2019-07-18 DIAGNOSIS — J43.9 PULMONARY EMPHYSEMA, UNSPECIFIED EMPHYSEMA TYPE (HCC): ICD-10-CM

## 2019-07-19 RX ORDER — BUDESONIDE AND FORMOTEROL FUMARATE DIHYDRATE 80; 4.5 UG/1; UG/1
2 AEROSOL RESPIRATORY (INHALATION) 2 TIMES DAILY
Qty: 1 INHALER | Refills: 3 | Status: SHIPPED | OUTPATIENT
Start: 2019-07-19 | End: 2019-07-30 | Stop reason: SDUPTHER

## 2019-07-30 DIAGNOSIS — F31.60 MIXED BIPOLAR I DISORDER (HCC): ICD-10-CM

## 2019-07-30 DIAGNOSIS — E78.2 MIXED HYPERLIPIDEMIA: ICD-10-CM

## 2019-07-30 DIAGNOSIS — J43.9 PULMONARY EMPHYSEMA, UNSPECIFIED EMPHYSEMA TYPE (HCC): ICD-10-CM

## 2019-07-30 DIAGNOSIS — I10 ESSENTIAL (PRIMARY) HYPERTENSION: ICD-10-CM

## 2019-07-30 DIAGNOSIS — R05.9 COUGH: ICD-10-CM

## 2019-07-30 DIAGNOSIS — K21.9 GASTROESOPHAGEAL REFLUX DISEASE WITHOUT ESOPHAGITIS: ICD-10-CM

## 2019-07-30 DIAGNOSIS — E11.9 TYPE 2 DIABETES MELLITUS WITHOUT COMPLICATION, WITHOUT LONG-TERM CURRENT USE OF INSULIN (HCC): Chronic | ICD-10-CM

## 2019-07-30 DIAGNOSIS — R35.0 FREQUENCY OF URINATION: ICD-10-CM

## 2019-07-30 DIAGNOSIS — J44.1 COPD EXACERBATION (HCC): ICD-10-CM

## 2019-07-31 RX ORDER — VENLAFAXINE HYDROCHLORIDE 150 MG/1
150 CAPSULE, EXTENDED RELEASE ORAL DAILY
Qty: 90 CAPSULE | Refills: 3 | Status: SHIPPED
Start: 2019-07-31 | End: 2020-08-03 | Stop reason: SDUPTHER

## 2019-07-31 RX ORDER — ATORVASTATIN CALCIUM 40 MG/1
40 TABLET, FILM COATED ORAL DAILY
Qty: 90 TABLET | Refills: 3 | Status: SHIPPED
Start: 2019-07-31 | End: 2020-08-03 | Stop reason: SDUPTHER

## 2019-07-31 RX ORDER — ARIPIPRAZOLE 5 MG/1
5 TABLET ORAL EVERY EVENING
Qty: 90 TABLET | Refills: 3 | Status: SHIPPED
Start: 2019-07-31 | End: 2020-08-03 | Stop reason: SDUPTHER

## 2019-07-31 RX ORDER — AMLODIPINE BESYLATE 10 MG/1
10 TABLET ORAL DAILY
Qty: 90 TABLET | Refills: 3 | Status: SHIPPED
Start: 2019-07-31 | End: 2020-08-03 | Stop reason: SDUPTHER

## 2019-07-31 RX ORDER — OMEPRAZOLE 20 MG/1
20 CAPSULE, DELAYED RELEASE ORAL 2 TIMES DAILY
Qty: 180 CAPSULE | Refills: 3 | Status: SHIPPED
Start: 2019-07-31 | End: 2020-08-03 | Stop reason: SDUPTHER

## 2019-07-31 RX ORDER — METFORMIN HYDROCHLORIDE 500 MG/1
500 TABLET, EXTENDED RELEASE ORAL
Qty: 90 TABLET | Refills: 3 | Status: SHIPPED
Start: 2019-07-31 | End: 2020-08-03 | Stop reason: SDUPTHER

## 2019-07-31 RX ORDER — BUDESONIDE AND FORMOTEROL FUMARATE DIHYDRATE 80; 4.5 UG/1; UG/1
2 AEROSOL RESPIRATORY (INHALATION) 2 TIMES DAILY
Qty: 3 INHALER | Refills: 3 | Status: SHIPPED
Start: 2019-07-31 | End: 2020-08-03 | Stop reason: SDUPTHER

## 2019-07-31 RX ORDER — TAMSULOSIN HYDROCHLORIDE 0.4 MG/1
0.4 CAPSULE ORAL NIGHTLY
Qty: 90 CAPSULE | Refills: 3 | Status: SHIPPED
Start: 2019-07-31 | End: 2020-08-03 | Stop reason: SDUPTHER

## 2019-07-31 RX ORDER — ALBUTEROL SULFATE 90 UG/1
2 AEROSOL, METERED RESPIRATORY (INHALATION) EVERY 6 HOURS PRN
Qty: 3 INHALER | Refills: 3 | Status: SHIPPED
Start: 2019-07-31 | End: 2020-02-19 | Stop reason: SDUPTHER

## 2019-10-06 ENCOUNTER — PATIENT MESSAGE (OUTPATIENT)
Dept: FAMILY MEDICINE CLINIC | Age: 69
End: 2019-10-06

## 2019-10-06 DIAGNOSIS — J44.1 COPD EXACERBATION (HCC): Primary | ICD-10-CM

## 2019-10-07 RX ORDER — ALBUTEROL SULFATE 90 UG/1
2 AEROSOL, METERED RESPIRATORY (INHALATION) 4 TIMES DAILY PRN
Qty: 1 INHALER | Refills: 0 | Status: SHIPPED
Start: 2019-10-07 | End: 2020-03-05 | Stop reason: CLARIF

## 2020-02-20 RX ORDER — ALBUTEROL SULFATE 90 UG/1
2 AEROSOL, METERED RESPIRATORY (INHALATION) EVERY 6 HOURS PRN
Qty: 3 INHALER | Refills: 3 | Status: SHIPPED
Start: 2020-02-20 | End: 2020-03-05 | Stop reason: SDUPTHER

## 2020-03-05 ENCOUNTER — HOSPITAL ENCOUNTER (OUTPATIENT)
Age: 70
Discharge: HOME | End: 2020-03-07
Payer: MEDICARE

## 2020-03-05 ENCOUNTER — OFFICE VISIT (OUTPATIENT)
Dept: FAMILY MEDICINE CLINIC | Age: 70
End: 2020-03-05
Payer: MEDICARE

## 2020-03-05 VITALS
HEIGHT: 72 IN | SYSTOLIC BLOOD PRESSURE: 128 MMHG | OXYGEN SATURATION: 96 % | TEMPERATURE: 98.1 F | BODY MASS INDEX: 26.41 KG/M2 | DIASTOLIC BLOOD PRESSURE: 78 MMHG | WEIGHT: 195 LBS | HEART RATE: 64 BPM | RESPIRATION RATE: 20 BRPM

## 2020-03-05 LAB
ALBUMIN SERPL-MCNC: 4.2 G/DL (ref 3.5–5.2)
ALP BLD-CCNC: 92 U/L (ref 40–129)
ALT SERPL-CCNC: 9 U/L (ref 0–40)
ANION GAP SERPL CALCULATED.3IONS-SCNC: 14 MMOL/L (ref 7–16)
AST SERPL-CCNC: 11 U/L (ref 0–39)
BILIRUB SERPL-MCNC: 0.2 MG/DL (ref 0–1.2)
BUN BLDV-MCNC: 10 MG/DL (ref 8–23)
CALCIUM SERPL-MCNC: 9.3 MG/DL (ref 8.6–10.2)
CHLORIDE BLD-SCNC: 103 MMOL/L (ref 98–107)
CHOLESTEROL, TOTAL: 145 MG/DL (ref 0–199)
CO2: 26 MMOL/L (ref 22–29)
CREAT SERPL-MCNC: 1.2 MG/DL (ref 0.7–1.2)
GFR AFRICAN AMERICAN: >60
GFR NON-AFRICAN AMERICAN: >60 ML/MIN/1.73
GLUCOSE BLD-MCNC: 103 MG/DL (ref 74–99)
HBA1C MFR BLD: 6.1 %
HDLC SERPL-MCNC: 57 MG/DL
LDL CHOLESTEROL CALCULATED: 59 MG/DL (ref 0–99)
POTASSIUM SERPL-SCNC: 4.2 MMOL/L (ref 3.5–5)
SODIUM BLD-SCNC: 143 MMOL/L (ref 132–146)
TOTAL PROTEIN: 7.2 G/DL (ref 6.4–8.3)
TRIGL SERPL-MCNC: 146 MG/DL (ref 0–149)
VLDLC SERPL CALC-MCNC: 29 MG/DL

## 2020-03-05 PROCEDURE — 90732 PPSV23 VACC 2 YRS+ SUBQ/IM: CPT | Performed by: STUDENT IN AN ORGANIZED HEALTH CARE EDUCATION/TRAINING PROGRAM

## 2020-03-05 PROCEDURE — 4040F PNEUMOC VAC/ADMIN/RCVD: CPT | Performed by: STUDENT IN AN ORGANIZED HEALTH CARE EDUCATION/TRAINING PROGRAM

## 2020-03-05 PROCEDURE — 83036 HEMOGLOBIN GLYCOSYLATED A1C: CPT | Performed by: STUDENT IN AN ORGANIZED HEALTH CARE EDUCATION/TRAINING PROGRAM

## 2020-03-05 PROCEDURE — 80061 LIPID PANEL: CPT

## 2020-03-05 PROCEDURE — 90653 IIV ADJUVANT VACCINE IM: CPT | Performed by: STUDENT IN AN ORGANIZED HEALTH CARE EDUCATION/TRAINING PROGRAM

## 2020-03-05 PROCEDURE — 80053 COMPREHEN METABOLIC PANEL: CPT

## 2020-03-05 PROCEDURE — G8427 DOCREV CUR MEDS BY ELIG CLIN: HCPCS | Performed by: STUDENT IN AN ORGANIZED HEALTH CARE EDUCATION/TRAINING PROGRAM

## 2020-03-05 PROCEDURE — 3017F COLORECTAL CA SCREEN DOC REV: CPT | Performed by: STUDENT IN AN ORGANIZED HEALTH CARE EDUCATION/TRAINING PROGRAM

## 2020-03-05 PROCEDURE — G0008 ADMIN INFLUENZA VIRUS VAC: HCPCS | Performed by: STUDENT IN AN ORGANIZED HEALTH CARE EDUCATION/TRAINING PROGRAM

## 2020-03-05 PROCEDURE — 3023F SPIROM DOC REV: CPT | Performed by: STUDENT IN AN ORGANIZED HEALTH CARE EDUCATION/TRAINING PROGRAM

## 2020-03-05 PROCEDURE — G8417 CALC BMI ABV UP PARAM F/U: HCPCS | Performed by: STUDENT IN AN ORGANIZED HEALTH CARE EDUCATION/TRAINING PROGRAM

## 2020-03-05 PROCEDURE — 2022F DILAT RTA XM EVC RTNOPTHY: CPT | Performed by: STUDENT IN AN ORGANIZED HEALTH CARE EDUCATION/TRAINING PROGRAM

## 2020-03-05 PROCEDURE — G8482 FLU IMMUNIZE ORDER/ADMIN: HCPCS | Performed by: STUDENT IN AN ORGANIZED HEALTH CARE EDUCATION/TRAINING PROGRAM

## 2020-03-05 PROCEDURE — 3044F HG A1C LEVEL LT 7.0%: CPT | Performed by: STUDENT IN AN ORGANIZED HEALTH CARE EDUCATION/TRAINING PROGRAM

## 2020-03-05 PROCEDURE — 36415 COLL VENOUS BLD VENIPUNCTURE: CPT | Performed by: FAMILY MEDICINE

## 2020-03-05 PROCEDURE — G8926 SPIRO NO PERF OR DOC: HCPCS | Performed by: STUDENT IN AN ORGANIZED HEALTH CARE EDUCATION/TRAINING PROGRAM

## 2020-03-05 PROCEDURE — 1036F TOBACCO NON-USER: CPT | Performed by: STUDENT IN AN ORGANIZED HEALTH CARE EDUCATION/TRAINING PROGRAM

## 2020-03-05 PROCEDURE — 99213 OFFICE O/P EST LOW 20 MIN: CPT | Performed by: STUDENT IN AN ORGANIZED HEALTH CARE EDUCATION/TRAINING PROGRAM

## 2020-03-05 PROCEDURE — G0009 ADMIN PNEUMOCOCCAL VACCINE: HCPCS | Performed by: STUDENT IN AN ORGANIZED HEALTH CARE EDUCATION/TRAINING PROGRAM

## 2020-03-05 PROCEDURE — 1123F ACP DISCUSS/DSCN MKR DOCD: CPT | Performed by: STUDENT IN AN ORGANIZED HEALTH CARE EDUCATION/TRAINING PROGRAM

## 2020-03-05 RX ORDER — ALBUTEROL SULFATE 90 UG/1
2 AEROSOL, METERED RESPIRATORY (INHALATION) EVERY 6 HOURS PRN
Qty: 3 INHALER | Refills: 3 | Status: SHIPPED | OUTPATIENT
Start: 2020-03-05 | End: 2020-12-13 | Stop reason: SDUPTHER

## 2020-03-05 RX ORDER — BUDESONIDE AND FORMOTEROL FUMARATE DIHYDRATE 160; 4.5 UG/1; UG/1
2 AEROSOL RESPIRATORY (INHALATION) 2 TIMES DAILY
Qty: 1 INHALER | Refills: 5 | Status: SHIPPED
Start: 2020-03-05 | End: 2020-03-05

## 2020-03-05 RX ORDER — FAMOTIDINE 20 MG/1
20 TABLET, FILM COATED ORAL 2 TIMES DAILY
Qty: 60 TABLET | Refills: 0 | Status: SHIPPED | OUTPATIENT
Start: 2020-03-05

## 2020-03-05 ASSESSMENT — PATIENT HEALTH QUESTIONNAIRE - PHQ9
SUM OF ALL RESPONSES TO PHQ QUESTIONS 1-9: 0
SUM OF ALL RESPONSES TO PHQ QUESTIONS 1-9: 0
2. FEELING DOWN, DEPRESSED OR HOPELESS: 0
SUM OF ALL RESPONSES TO PHQ9 QUESTIONS 1 & 2: 0
1. LITTLE INTEREST OR PLEASURE IN DOING THINGS: 0
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
## 2020-03-05 NOTE — PROGRESS NOTES
Vaccine Information Sheet, \"Influenza - Inactivated\"  given to Jeff Bee, or parent/legal guardian of  Jeff Bee and verbalized understanding. Patient responses:    Have you ever had a reaction to a flu vaccine? No  Do you have any current illness? No  Have you ever had Guillian Christiana Syndrome? No  Do you have a serious allergy to any of the follow: Neomycin, Polymyxin, Thimerosal, eggs or egg products? No    Flu vaccine given per order. Please see immunization tab. Risks and benefits explained. Current VIS given.

## 2020-03-06 ENCOUNTER — TELEPHONE (OUTPATIENT)
Dept: FAMILY MEDICINE CLINIC | Age: 70
End: 2020-03-06

## 2020-03-06 NOTE — TELEPHONE ENCOUNTER
[facility] pharmacy called, Famotidine is on national back order. Do you want to change? If so please send to pharmacy.

## 2020-03-09 ASSESSMENT — ENCOUNTER SYMPTOMS
SHORTNESS OF BREATH: 0
ABDOMINAL PAIN: 0
COUGH: 1

## 2020-07-31 RX ORDER — TAMSULOSIN HYDROCHLORIDE 0.4 MG/1
0.4 CAPSULE ORAL NIGHTLY
Qty: 90 CAPSULE | Refills: 0 | OUTPATIENT
Start: 2020-07-31

## 2020-07-31 RX ORDER — OMEPRAZOLE 20 MG/1
20 CAPSULE, DELAYED RELEASE ORAL 2 TIMES DAILY
Qty: 180 CAPSULE | Refills: 0 | OUTPATIENT
Start: 2020-07-31

## 2020-07-31 RX ORDER — METFORMIN HYDROCHLORIDE 500 MG/1
500 TABLET, EXTENDED RELEASE ORAL
Qty: 90 TABLET | Refills: 0 | OUTPATIENT
Start: 2020-07-31

## 2020-07-31 RX ORDER — TIOTROPIUM BROMIDE 18 UG/1
18 CAPSULE ORAL; RESPIRATORY (INHALATION) DAILY
Qty: 90 CAPSULE | Refills: 0 | OUTPATIENT
Start: 2020-07-31

## 2020-07-31 RX ORDER — AMLODIPINE BESYLATE 10 MG/1
10 TABLET ORAL DAILY
Qty: 90 TABLET | Refills: 0 | OUTPATIENT
Start: 2020-07-31

## 2020-07-31 RX ORDER — VENLAFAXINE HYDROCHLORIDE 150 MG/1
150 CAPSULE, EXTENDED RELEASE ORAL DAILY
Qty: 90 CAPSULE | Refills: 0 | OUTPATIENT
Start: 2020-07-31

## 2020-07-31 RX ORDER — ARIPIPRAZOLE 5 MG/1
5 TABLET ORAL EVERY EVENING
Qty: 90 TABLET | Refills: 0 | OUTPATIENT
Start: 2020-07-31

## 2020-07-31 RX ORDER — ATORVASTATIN CALCIUM 40 MG/1
40 TABLET, FILM COATED ORAL DAILY
Qty: 90 TABLET | Refills: 0 | OUTPATIENT
Start: 2020-07-31

## 2020-07-31 RX ORDER — BUDESONIDE AND FORMOTEROL FUMARATE DIHYDRATE 80; 4.5 UG/1; UG/1
2 AEROSOL RESPIRATORY (INHALATION) 2 TIMES DAILY
Qty: 3 INHALER | Refills: 0 | OUTPATIENT
Start: 2020-07-31

## 2020-07-31 NOTE — TELEPHONE ENCOUNTER
Multiple refill requests received from [facility]. [address] via fax. Patient was due for an appointment in May per last OV note. Left message for patient asking that he call to schedule an appointment so we may continue medications.      Requested Prescriptions     Pending Prescriptions Disp Refills    ARIPiprazole (ABILIFY) 5 MG tablet 90 tablet 0     Sig: Take 1 tablet by mouth every evening    amLODIPine (NORVASC) 10 MG tablet 90 tablet 0     Sig: Take 1 tablet by mouth daily    tamsulosin (FLOMAX) 0.4 MG capsule 90 capsule 0     Sig: Take 1 capsule by mouth nightly    venlafaxine (EFFEXOR XR) 150 MG extended release capsule 90 capsule 0     Sig: Take 1 capsule by mouth daily    omeprazole (PRILOSEC) 20 MG delayed release capsule 180 capsule 0     Sig: Take 1 capsule by mouth 2 times daily    atorvastatin (LIPITOR) 40 MG tablet 90 tablet 0     Sig: Take 1 tablet by mouth daily    metFORMIN (GLUCOPHAGE-XR) 500 MG extended release tablet 90 tablet 0     Sig: Take 1 tablet by mouth daily (with breakfast)    tiotropium (SPIRIVA HANDIHALER) 18 MCG inhalation capsule 90 capsule 0     Sig: Inhale 1 capsule into the lungs daily    budesonide-formoterol (SYMBICORT) 80-4.5 MCG/ACT AERO 3 Inhaler 0     Sig: Inhale 2 puffs into the lungs 2 times daily

## 2020-07-31 NOTE — TELEPHONE ENCOUNTER
Patient called back and made an appointment for soonest available opening 8/11/2020.  Medications re-pended for provider approval.

## 2020-08-03 RX ORDER — ARIPIPRAZOLE 5 MG/1
5 TABLET ORAL EVERY EVENING
Qty: 90 TABLET | Refills: 0 | Status: SHIPPED
Start: 2020-08-03 | End: 2020-10-12 | Stop reason: SDUPTHER

## 2020-08-03 RX ORDER — ATORVASTATIN CALCIUM 40 MG/1
40 TABLET, FILM COATED ORAL DAILY
Qty: 90 TABLET | Refills: 0 | Status: SHIPPED
Start: 2020-08-03 | End: 2020-10-12 | Stop reason: SDUPTHER

## 2020-08-03 RX ORDER — AMLODIPINE BESYLATE 10 MG/1
10 TABLET ORAL DAILY
Qty: 90 TABLET | Refills: 0 | Status: SHIPPED
Start: 2020-08-03 | End: 2020-10-12 | Stop reason: SDUPTHER

## 2020-08-03 RX ORDER — TAMSULOSIN HYDROCHLORIDE 0.4 MG/1
0.4 CAPSULE ORAL NIGHTLY
Qty: 90 CAPSULE | Refills: 0 | Status: SHIPPED
Start: 2020-08-03 | End: 2020-10-12 | Stop reason: SDUPTHER

## 2020-08-03 RX ORDER — VENLAFAXINE HYDROCHLORIDE 150 MG/1
150 CAPSULE, EXTENDED RELEASE ORAL DAILY
Qty: 90 CAPSULE | Refills: 0 | Status: SHIPPED
Start: 2020-08-03 | End: 2020-10-12 | Stop reason: SDUPTHER

## 2020-08-03 RX ORDER — TIOTROPIUM BROMIDE 18 UG/1
18 CAPSULE ORAL; RESPIRATORY (INHALATION) DAILY
Qty: 90 CAPSULE | Refills: 0 | Status: SHIPPED
Start: 2020-08-03 | End: 2020-10-12 | Stop reason: SDUPTHER

## 2020-08-03 RX ORDER — METFORMIN HYDROCHLORIDE 500 MG/1
500 TABLET, EXTENDED RELEASE ORAL
Qty: 90 TABLET | Refills: 0 | Status: SHIPPED
Start: 2020-08-03 | End: 2020-10-12 | Stop reason: SDUPTHER

## 2020-08-03 RX ORDER — OMEPRAZOLE 20 MG/1
20 CAPSULE, DELAYED RELEASE ORAL 2 TIMES DAILY
Qty: 180 CAPSULE | Refills: 0 | Status: SHIPPED
Start: 2020-08-03 | End: 2020-10-12 | Stop reason: SDUPTHER

## 2020-08-03 RX ORDER — BUDESONIDE AND FORMOTEROL FUMARATE DIHYDRATE 80; 4.5 UG/1; UG/1
2 AEROSOL RESPIRATORY (INHALATION) 2 TIMES DAILY
Qty: 3 INHALER | Refills: 0 | Status: SHIPPED
Start: 2020-08-03 | End: 2021-11-11 | Stop reason: SDUPTHER

## 2020-08-11 ENCOUNTER — HOSPITAL ENCOUNTER (OUTPATIENT)
Age: 70
Discharge: HOME | End: 2020-08-13
Payer: MEDICARE

## 2020-08-11 ENCOUNTER — OFFICE VISIT (OUTPATIENT)
Dept: FAMILY MEDICINE CLINIC | Age: 70
End: 2020-08-11
Payer: MEDICARE

## 2020-08-11 VITALS
TEMPERATURE: 97.1 F | RESPIRATION RATE: 18 BRPM | BODY MASS INDEX: 26.68 KG/M2 | SYSTOLIC BLOOD PRESSURE: 116 MMHG | WEIGHT: 197 LBS | HEIGHT: 72 IN | DIASTOLIC BLOOD PRESSURE: 67 MMHG | OXYGEN SATURATION: 95 % | HEART RATE: 61 BPM

## 2020-08-11 LAB
CREATININE URINE: 301 MG/DL (ref 40–278)
HBA1C MFR BLD: 6.4 %
MICROALBUMIN UR-MCNC: 24.2 MG/L
MICROALBUMIN/CREAT UR-RTO: 8 (ref 0–30)

## 2020-08-11 PROCEDURE — 3023F SPIROM DOC REV: CPT | Performed by: STUDENT IN AN ORGANIZED HEALTH CARE EDUCATION/TRAINING PROGRAM

## 2020-08-11 PROCEDURE — G8427 DOCREV CUR MEDS BY ELIG CLIN: HCPCS | Performed by: STUDENT IN AN ORGANIZED HEALTH CARE EDUCATION/TRAINING PROGRAM

## 2020-08-11 PROCEDURE — 3044F HG A1C LEVEL LT 7.0%: CPT | Performed by: STUDENT IN AN ORGANIZED HEALTH CARE EDUCATION/TRAINING PROGRAM

## 2020-08-11 PROCEDURE — 3017F COLORECTAL CA SCREEN DOC REV: CPT | Performed by: STUDENT IN AN ORGANIZED HEALTH CARE EDUCATION/TRAINING PROGRAM

## 2020-08-11 PROCEDURE — G8417 CALC BMI ABV UP PARAM F/U: HCPCS | Performed by: STUDENT IN AN ORGANIZED HEALTH CARE EDUCATION/TRAINING PROGRAM

## 2020-08-11 PROCEDURE — 2022F DILAT RTA XM EVC RTNOPTHY: CPT | Performed by: STUDENT IN AN ORGANIZED HEALTH CARE EDUCATION/TRAINING PROGRAM

## 2020-08-11 PROCEDURE — 4040F PNEUMOC VAC/ADMIN/RCVD: CPT | Performed by: STUDENT IN AN ORGANIZED HEALTH CARE EDUCATION/TRAINING PROGRAM

## 2020-08-11 PROCEDURE — 99213 OFFICE O/P EST LOW 20 MIN: CPT | Performed by: STUDENT IN AN ORGANIZED HEALTH CARE EDUCATION/TRAINING PROGRAM

## 2020-08-11 PROCEDURE — 1123F ACP DISCUSS/DSCN MKR DOCD: CPT | Performed by: STUDENT IN AN ORGANIZED HEALTH CARE EDUCATION/TRAINING PROGRAM

## 2020-08-11 PROCEDURE — G8926 SPIRO NO PERF OR DOC: HCPCS | Performed by: STUDENT IN AN ORGANIZED HEALTH CARE EDUCATION/TRAINING PROGRAM

## 2020-08-11 PROCEDURE — 82570 ASSAY OF URINE CREATININE: CPT

## 2020-08-11 PROCEDURE — 1036F TOBACCO NON-USER: CPT | Performed by: STUDENT IN AN ORGANIZED HEALTH CARE EDUCATION/TRAINING PROGRAM

## 2020-08-11 PROCEDURE — 83036 HEMOGLOBIN GLYCOSYLATED A1C: CPT | Performed by: STUDENT IN AN ORGANIZED HEALTH CARE EDUCATION/TRAINING PROGRAM

## 2020-08-11 PROCEDURE — 82044 UR ALBUMIN SEMIQUANTITATIVE: CPT

## 2020-08-11 RX ORDER — SILDENAFIL CITRATE 20 MG/1
20 TABLET ORAL DAILY PRN
Qty: 5 TABLET | Refills: 0 | Status: SHIPPED | OUTPATIENT
Start: 2020-08-11 | End: 2020-11-04 | Stop reason: ALTCHOICE

## 2020-08-11 RX ORDER — SUCRALFATE 1 G/1
1 TABLET ORAL
COMMUNITY

## 2020-08-11 RX ORDER — GLUCOSAMINE HCL/CHONDROITIN SU 500-400 MG
CAPSULE ORAL
Qty: 100 STRIP | Refills: 1 | Status: SHIPPED | OUTPATIENT
Start: 2020-08-11

## 2020-08-11 NOTE — PROGRESS NOTES
S: 72 y.o. male with   Chief Complaint   Patient presents with    COPD    Diabetes       COPD: Doing well, using inhalers, no concerns  Difficulty getting erections, 6 months, longtime smoker, +DM  +claudication symptoms    BP Readings from Last 3 Encounters:   08/11/20 116/67   03/05/20 128/78   06/03/19 124/74       O: VS:  height is 6' (1.829 m) and weight is 197 lb (89.4 kg). His temporal temperature is 97.1 °F (36.2 °C). His blood pressure is 116/67 and his pulse is 61. His respiration is 18 and oxygen saturation is 95%. AAO/NAD, appropriate affect for mood  CV:  RRR, no murmur  Resp: CTAB  Ext: +decreased pulses in feet    Impression/Plan:   1) COPD: Controlled, continue same  2) ED: Check MAVIS, trial of viagra prn  3) Decreased pulses: Check MAVIS      Health Maintenance Due   Topic Date Due    Shingles Vaccine (1 of 2) 03/30/2001    Colon cancer screen colonoscopy  03/30/2001    Annual Wellness Visit (AWV)  06/19/2019    Diabetic retinal exam  10/09/2019    Diabetic microalbuminuria test  06/03/2020         Attending Physician Statement  I have discussed the case, including pertinent history and exam findings with the resident. I agree with the documented assessment and plan.       Suri Moore MD

## 2020-08-11 NOTE — PROGRESS NOTES
Patient:  Shital Toussaint 72 y.o. male     Date of Service: 8/11/20      Chiefcomplaint:   Chief Complaint   Patient presents with    COPD    Diabetes       History of Present Illness     6 months difficulty having an erection  No morning erection  No new medications - does take effexor  Cold feet  +claudication  No peripheral vascular eval previously    Copd - stable on symbicort, spiriva, albuterol    Dm - stable on metformin. Due for labs and a1c  Lab Results   Component Value Date    LABA1C 6.4 08/11/2020     No results found for: EAG      Review of Systems:   Review of Systems no fever, chills. +cough but this is chronic for him and actually better than usual. No chest pain. Pertinent Medical, Family, Surgical, Social History:  Diabetes  Smoker  copd  No heart disease    Physical Exam   Vitals: /67 (Site: Left Upper Arm, Position: Sitting, Cuff Size: Medium Adult)   Pulse 61   Temp 97.1 °F (36.2 °C) (Temporal)   Resp 18   Ht 6' (1.829 m)   Wt 197 lb (89.4 kg)   SpO2 95%   BMI 26.72 kg/m²   General Appearance: Alert, oriented, no acute distress  HEENT: No scleral icterus. +conjunctival injection b/l that is chronic  Neck: Not rigid. No visible masses  Chest wall/Lung: Clear to auscultation bilaterally,  respirations unlabored. No ronchi/wheezing/rales  Heart[de-identified]  RRR, no murmur  Abdomen: Soft, not distended  Extremities:  No edema. Decreased dorsalis pedis pulses b/l  Skin: No rashes. No jaundice. Signs of peripheral vascular dz present  Neuro: Alert and oriented        Psych: Appropriate mood and appropriate affect    Assessment and Plan     1. Type 2 diabetes mellitus without complication, without long-term current use of insulin ([address]  Diabetes is stable although a1c increased slightly from previous. Continue current meds. Check labs and urine. Return in 3 months  - Microalbumin / creatinine urine ratio; Future  - POCT glycosylated hemoglobin (Hb A1C)  - blood glucose monitor strips;  Test one

## 2020-08-28 ENCOUNTER — HOSPITAL ENCOUNTER (OUTPATIENT)
Dept: INTERVENTIONAL RADIOLOGY/VASCULAR | Age: 70
Discharge: HOME | End: 2020-08-30
Payer: MEDICARE

## 2020-08-28 ENCOUNTER — TELEPHONE (OUTPATIENT)
Dept: FAMILY MEDICINE CLINIC | Age: 70
End: 2020-08-28

## 2020-08-28 PROCEDURE — 93922 UPR/L XTREMITY ART 2 LEVELS: CPT

## 2020-08-28 NOTE — TELEPHONE ENCOUNTER
Francyne Dance called to notify PCP Yvette Montero had vascular studies done today (results in process) and they'd like a call once doctor reviews this if possible.

## 2020-08-31 NOTE — TELEPHONE ENCOUNTER
Called and left message for patient regarding normal MAVIS studies. Asked to call back if there were any questions.

## 2020-10-05 ENCOUNTER — OFFICE VISIT (OUTPATIENT)
Dept: FAMILY MEDICINE CLINIC | Age: 70
End: 2020-10-05
Payer: MEDICARE

## 2020-10-05 VITALS
BODY MASS INDEX: 26.28 KG/M2 | OXYGEN SATURATION: 97 % | SYSTOLIC BLOOD PRESSURE: 138 MMHG | HEIGHT: 72 IN | HEART RATE: 70 BPM | WEIGHT: 194 LBS | RESPIRATION RATE: 14 BRPM | TEMPERATURE: 97.6 F | DIASTOLIC BLOOD PRESSURE: 86 MMHG

## 2020-10-05 PROCEDURE — 1036F TOBACCO NON-USER: CPT | Performed by: STUDENT IN AN ORGANIZED HEALTH CARE EDUCATION/TRAINING PROGRAM

## 2020-10-05 PROCEDURE — G8427 DOCREV CUR MEDS BY ELIG CLIN: HCPCS | Performed by: STUDENT IN AN ORGANIZED HEALTH CARE EDUCATION/TRAINING PROGRAM

## 2020-10-05 PROCEDURE — 4040F PNEUMOC VAC/ADMIN/RCVD: CPT | Performed by: STUDENT IN AN ORGANIZED HEALTH CARE EDUCATION/TRAINING PROGRAM

## 2020-10-05 PROCEDURE — G8417 CALC BMI ABV UP PARAM F/U: HCPCS | Performed by: STUDENT IN AN ORGANIZED HEALTH CARE EDUCATION/TRAINING PROGRAM

## 2020-10-05 PROCEDURE — 1123F ACP DISCUSS/DSCN MKR DOCD: CPT | Performed by: STUDENT IN AN ORGANIZED HEALTH CARE EDUCATION/TRAINING PROGRAM

## 2020-10-05 PROCEDURE — G8926 SPIRO NO PERF OR DOC: HCPCS | Performed by: STUDENT IN AN ORGANIZED HEALTH CARE EDUCATION/TRAINING PROGRAM

## 2020-10-05 PROCEDURE — 3017F COLORECTAL CA SCREEN DOC REV: CPT | Performed by: STUDENT IN AN ORGANIZED HEALTH CARE EDUCATION/TRAINING PROGRAM

## 2020-10-05 PROCEDURE — G8484 FLU IMMUNIZE NO ADMIN: HCPCS | Performed by: STUDENT IN AN ORGANIZED HEALTH CARE EDUCATION/TRAINING PROGRAM

## 2020-10-05 PROCEDURE — 99213 OFFICE O/P EST LOW 20 MIN: CPT | Performed by: STUDENT IN AN ORGANIZED HEALTH CARE EDUCATION/TRAINING PROGRAM

## 2020-10-05 PROCEDURE — 3023F SPIROM DOC REV: CPT | Performed by: STUDENT IN AN ORGANIZED HEALTH CARE EDUCATION/TRAINING PROGRAM

## 2020-10-05 RX ORDER — BUDESONIDE 0.25 MG/2ML
1 INHALANT ORAL 2 TIMES DAILY
Qty: 60 AMPULE | Refills: 3 | Status: SHIPPED
Start: 2020-10-05 | End: 2020-10-15 | Stop reason: SDUPTHER

## 2020-10-05 RX ORDER — FORMOTEROL FUMARATE 20 UG/2ML
20 SOLUTION RESPIRATORY (INHALATION) EVERY 12 HOURS SCHEDULED
Qty: 2 ML | Refills: 3 | Status: SHIPPED
Start: 2020-10-05 | End: 2020-11-04

## 2020-10-05 RX ORDER — DOXYCYCLINE HYCLATE 100 MG
100 TABLET ORAL 2 TIMES DAILY
Qty: 14 TABLET | Refills: 0 | Status: SHIPPED | OUTPATIENT
Start: 2020-10-05 | End: 2020-10-12

## 2020-10-05 RX ORDER — PREDNISONE 20 MG/1
40 TABLET ORAL DAILY
Qty: 10 TABLET | Refills: 0 | Status: SHIPPED | OUTPATIENT
Start: 2020-10-05 | End: 2020-10-10

## 2020-10-05 RX ORDER — PREDNISONE 20 MG/1
40 TABLET ORAL DAILY
Qty: 10 TABLET | Refills: 0 | Status: SHIPPED
Start: 2020-10-05 | End: 2020-10-05 | Stop reason: DRUGHIGH

## 2020-10-05 ASSESSMENT — ENCOUNTER SYMPTOMS
ABDOMINAL PAIN: 1
SHORTNESS OF BREATH: 0
WHEEZING: 1
BLOOD IN STOOL: 0
COUGH: 1

## 2020-10-05 NOTE — PROGRESS NOTES
Patient:  Chuy Hill 72 y.o. male     Date of Service: 10/5/20      Chiefcomplaint:   Chief Complaint   Patient presents with    Hip Pain     Bilateral    COPD     History of Present Illness     Increased cough, productive for colored sputum x1 week. Increased use of albuterol  +wheezing  No fever, chills. Doesn't feel sob. Is using symbicort, spiriva and albuterol    Scratched by his dog a couple weeks ago. Healing but formed hard spot on his arm. No pain or drainage or surrounding redness. Using tylenol for hip pain due to weather changes - sounds like using way too much    Review of Systems:   Review of Systems   Constitutional: Negative for chills and fever. Respiratory: Positive for cough and wheezing. Negative for shortness of breath. Cardiovascular: Negative for chest pain, palpitations and leg swelling. Gastrointestinal: Positive for abdominal pain. Negative for blood in stool. Skin: Positive for wound. Negative for rash. Neurological: Negative for syncope and facial asymmetry. Psychiatric/Behavioral: Negative for behavioral problems. Pertinent Medical, Family, Surgical, Social History:  Copd  Anxiety  Smoking hx  Hip pain - chronic    Physical Exam   Vitals: /86 (Site: Left Upper Arm, Position: Sitting, Cuff Size: Medium Adult)   Pulse 70   Temp 97.6 °F (36.4 °C) (Temporal)   Resp 14   Ht 6' (1.829 m)   Wt 194 lb (88 kg)   SpO2 97%   BMI 26.31 kg/m²   General Appearance: Alert, oriented, no acute distress  HEENT: No scleral icterus. Blown pupil (chronic). Erythema (chronic)  Neck: Not rigid. No visible masses  Chest wall/Lung: reduced air exchange. +wheezing. Heart[de-identified]  RRR, no murmur  Abdomen: Soft, tender (chronic). Extremities:  No edema  Skin: No rashes. No jaundice  Neuro: Alert and oriented        Psych: Appropriate mood and appropriate affect    Assessment and Plan     1. COPD exacerbation ([address]  Can start doxy and prednisone.  No hypoxia or increased respiration. afebrile. Will recheck in 1 week for improvement of symptoms. Recommend nebulizer for persistent symptoms not controlled with hand inhaler.   - doxycycline hyclate (VIBRA-TABS) 100 MG tablet; Take 1 tablet by mouth 2 times daily for 7 days  Dispense: 14 tablet; Refill: 0  - DME Order for Nebulizer as OP  - predniSONE (DELTASONE) 20 MG tablet; Take 2 tablets by mouth daily for 10 days  Dispense: 10 tablet; Refill: 0  - formoterol (PERFOROMIST) 20 MCG/2ML nebulizer solution; Take 2 mLs by nebulization every 12 hours  Dispense: 2 mL; Refill: 3  - budesonide (PULMICORT) 0.25 MG/2ML nebulizer suspension; Take 2 mLs by nebulization 2 times daily  Dispense: 60 ampule; Refill: 3    2. Wound of left upper extremity, initial encounter  Does not look infected. Used neosporin when it happened. Keep clean and reassess if worsens. 3. Decrease tylenol use to 2g max daily - counseled extensively          Return to Office: No follow-ups on file. Kenyon Wiseman, DO       This document may have been prepared at least partially through the use of voice recognition software. Although effort is taken to assure the accuracy of this document, it is possible that grammatical, syntax,  or spelling errors may occur.

## 2020-10-05 NOTE — PROGRESS NOTES
[facility]  Precepting Note    Subjective:  C/o cough  Hx of COPD  No fever, chills      ROS otherwise negative     Past medical, surgical, family and social history were reviewed, non-contributory, and unchanged unless otherwise stated. Objective:    /86 (Site: Left Upper Arm, Position: Sitting, Cuff Size: Medium Adult)   Pulse 70   Temp 97.6 °F (36.4 °C) (Temporal)   Resp 14   Ht 6' (1.829 m)   Wt 194 lb (88 kg)   SpO2 97%   BMI 26.31 kg/m²     Exam is as noted by resident with the following changes, additions or corrections:    General:  NAD; alert & oriented x 3   Heart:  RRR, no murmurs, gallops, or rubs. Lungs:  CTA bilaterally,mild wheezing  Extrem:  No clubbing, cyanosis, or edema    Assessment/Plan:  Mild COPD exacerbation  - antibiotics  -  Steroids  -  Nebs         Attending Physician Statement  I have reviewed the chart, including any radiology or labs. I have discussed the case, including pertinent history and exam findings with the resident. I agree with the assessment, plan and orders as documented by the resident. Please refer to the resident note for additional information.       Electronically signed by Audelia Novoa MD on 10/5/2020 at 1:49 PM

## 2020-10-12 ENCOUNTER — OFFICE VISIT (OUTPATIENT)
Dept: FAMILY MEDICINE CLINIC | Age: 70
End: 2020-10-12
Payer: MEDICARE

## 2020-10-12 VITALS
RESPIRATION RATE: 18 BRPM | TEMPERATURE: 97.4 F | DIASTOLIC BLOOD PRESSURE: 69 MMHG | SYSTOLIC BLOOD PRESSURE: 115 MMHG | HEART RATE: 58 BPM | WEIGHT: 194 LBS | HEIGHT: 72 IN | BODY MASS INDEX: 26.28 KG/M2

## 2020-10-12 PROCEDURE — 2022F DILAT RTA XM EVC RTNOPTHY: CPT | Performed by: STUDENT IN AN ORGANIZED HEALTH CARE EDUCATION/TRAINING PROGRAM

## 2020-10-12 PROCEDURE — 90694 VACC AIIV4 NO PRSRV 0.5ML IM: CPT | Performed by: STUDENT IN AN ORGANIZED HEALTH CARE EDUCATION/TRAINING PROGRAM

## 2020-10-12 PROCEDURE — G8417 CALC BMI ABV UP PARAM F/U: HCPCS | Performed by: STUDENT IN AN ORGANIZED HEALTH CARE EDUCATION/TRAINING PROGRAM

## 2020-10-12 PROCEDURE — G0008 ADMIN INFLUENZA VIRUS VAC: HCPCS | Performed by: STUDENT IN AN ORGANIZED HEALTH CARE EDUCATION/TRAINING PROGRAM

## 2020-10-12 PROCEDURE — 3044F HG A1C LEVEL LT 7.0%: CPT | Performed by: STUDENT IN AN ORGANIZED HEALTH CARE EDUCATION/TRAINING PROGRAM

## 2020-10-12 PROCEDURE — 3023F SPIROM DOC REV: CPT | Performed by: STUDENT IN AN ORGANIZED HEALTH CARE EDUCATION/TRAINING PROGRAM

## 2020-10-12 PROCEDURE — 1036F TOBACCO NON-USER: CPT | Performed by: STUDENT IN AN ORGANIZED HEALTH CARE EDUCATION/TRAINING PROGRAM

## 2020-10-12 PROCEDURE — 99213 OFFICE O/P EST LOW 20 MIN: CPT | Performed by: STUDENT IN AN ORGANIZED HEALTH CARE EDUCATION/TRAINING PROGRAM

## 2020-10-12 PROCEDURE — G8484 FLU IMMUNIZE NO ADMIN: HCPCS | Performed by: STUDENT IN AN ORGANIZED HEALTH CARE EDUCATION/TRAINING PROGRAM

## 2020-10-12 PROCEDURE — G8926 SPIRO NO PERF OR DOC: HCPCS | Performed by: STUDENT IN AN ORGANIZED HEALTH CARE EDUCATION/TRAINING PROGRAM

## 2020-10-12 PROCEDURE — 4040F PNEUMOC VAC/ADMIN/RCVD: CPT | Performed by: STUDENT IN AN ORGANIZED HEALTH CARE EDUCATION/TRAINING PROGRAM

## 2020-10-12 PROCEDURE — 1123F ACP DISCUSS/DSCN MKR DOCD: CPT | Performed by: STUDENT IN AN ORGANIZED HEALTH CARE EDUCATION/TRAINING PROGRAM

## 2020-10-12 PROCEDURE — 3017F COLORECTAL CA SCREEN DOC REV: CPT | Performed by: STUDENT IN AN ORGANIZED HEALTH CARE EDUCATION/TRAINING PROGRAM

## 2020-10-12 PROCEDURE — G8427 DOCREV CUR MEDS BY ELIG CLIN: HCPCS | Performed by: STUDENT IN AN ORGANIZED HEALTH CARE EDUCATION/TRAINING PROGRAM

## 2020-10-12 RX ORDER — VENLAFAXINE HYDROCHLORIDE 150 MG/1
150 CAPSULE, EXTENDED RELEASE ORAL DAILY
Qty: 90 CAPSULE | Refills: 3 | Status: SHIPPED
Start: 2020-10-12 | End: 2021-08-04

## 2020-10-12 RX ORDER — METFORMIN HYDROCHLORIDE 500 MG/1
500 TABLET, EXTENDED RELEASE ORAL
Qty: 90 TABLET | Refills: 3 | Status: SHIPPED
Start: 2020-10-12 | End: 2021-08-04

## 2020-10-12 RX ORDER — ARIPIPRAZOLE 5 MG/1
5 TABLET ORAL EVERY EVENING
Qty: 90 TABLET | Refills: 3 | Status: SHIPPED
Start: 2020-10-12 | End: 2021-08-04

## 2020-10-12 RX ORDER — OMEPRAZOLE 20 MG/1
20 CAPSULE, DELAYED RELEASE ORAL 2 TIMES DAILY
Qty: 180 CAPSULE | Refills: 3 | Status: SHIPPED
Start: 2020-10-12 | End: 2021-08-04

## 2020-10-12 RX ORDER — ATORVASTATIN CALCIUM 40 MG/1
40 TABLET, FILM COATED ORAL DAILY
Qty: 90 TABLET | Refills: 3 | Status: SHIPPED
Start: 2020-10-12 | End: 2021-08-04

## 2020-10-12 RX ORDER — IPRATROPIUM BROMIDE AND ALBUTEROL SULFATE 2.5; .5 MG/3ML; MG/3ML
1 SOLUTION RESPIRATORY (INHALATION) EVERY 6 HOURS PRN
Qty: 360 ML | Refills: 0 | Status: SHIPPED
Start: 2020-10-12 | End: 2020-12-22 | Stop reason: SDUPTHER

## 2020-10-12 RX ORDER — TIOTROPIUM BROMIDE 18 UG/1
18 CAPSULE ORAL; RESPIRATORY (INHALATION) DAILY
Qty: 90 CAPSULE | Refills: 3 | Status: SHIPPED
Start: 2020-10-12 | End: 2021-09-15

## 2020-10-12 RX ORDER — AMLODIPINE BESYLATE 10 MG/1
10 TABLET ORAL DAILY
Qty: 90 TABLET | Refills: 3 | Status: SHIPPED
Start: 2020-10-12 | End: 2021-08-04

## 2020-10-12 RX ORDER — TAMSULOSIN HYDROCHLORIDE 0.4 MG/1
0.4 CAPSULE ORAL NIGHTLY
Qty: 90 CAPSULE | Refills: 3 | Status: SHIPPED
Start: 2020-10-12 | End: 2021-08-04

## 2020-10-12 ASSESSMENT — ENCOUNTER SYMPTOMS
ROS SKIN COMMENTS: CYSTS
COUGH: 1
BLOOD IN STOOL: 0
SHORTNESS OF BREATH: 0
WHEEZING: 1

## 2020-10-12 NOTE — PROGRESS NOTES
Patient:  Felipe Mason 72 y.o. male     Date of Service: 10/12/20      Chiefcomplaint:   Chief Complaint   Patient presents with    COPD     Perforomist $278 wants changed; using Pulmicort    Skin Lesion     left lower arm    Hip Pain     would like Prednisone refill     History of Present Illness     FU from copd exacerbation tx with doxy and prednisone. Feeling improved. Cough is improved. No fever. SOB improved. Notes perforomist was very expensive and he did not get it. Trying to quit marijuana. Transitioning to edibles. Has skin lesion on ear and arm. Present for a long time now. Would like to have them removed. No pain, drainage, sign of infection. Review of Systems:   Review of Systems   Constitutional: Negative for chills and fever. HENT: Negative for congestion. Respiratory: Positive for cough and wheezing. Negative for shortness of breath. Cardiovascular: Negative for chest pain and palpitations. Gastrointestinal: Negative for blood in stool. Endocrine: Negative for polyuria. Skin: Negative for rash. cysts   Neurological: Negative for light-headedness. Pertinent Medical, Family, Surgical, Social History:  Copd - emphysema  +mj use      Physical Exam   Vitals: /69   Pulse 58   Temp 97.4 °F (36.3 °C) (Temporal)   Resp 18   Ht 6' (1.829 m)   Wt 194 lb (88 kg)   BMI 26.31 kg/m²   General Appearance: Alert, oriented, no acute distress  HEENT: No scleral icterus. No visible discharge from eyes  Neck: Not rigid. No visible masses  Chest wall/Lung: some wheeze present b/l. Improved air exchange from previous. No rales or rhonchi. Overall improved. Heart[de-identified]  RRR, no murmur  Abdomen: Soft, nontender  Extremities:  No edema  Skin: No rashes. No jaundice  Neuro: Alert and oriented        Psych: Appropriate mood and appropriate affect    Assessment and Plan     1. COPD exacerbation ([address]  Will start duoneb tx as needed. Overall improved.  Can continue spiriva, symbicort and then use duonebs. Will get PFT in near future once illness resolved. Given flu shot. - ipratropium-albuterol (DUONEB) 0.5-2.5 (3) MG/3ML SOLN nebulizer solution; Inhale 3 mLs into the lungs every 6 hours as needed for Shortness of Breath  Dispense: 360 mL; Refill: 0  - Full PFT Study With Bronchodilator; Future    2. Sebaceous cyst of ear  Referral.   - Jovanna Yap MD,  Dermatology, [place] ([facility])    3. Screen for colon cancer  Due to colon cancer screen. No fam hx. No diarrhea. No blood in stool.   - Kayleen Landa MD, General Surgery, [place]    4. Need for immunization against influenza  - INFLUENZA, QUADV, ADJUVANTED, 65 YRS =, IM, PF, PREFILL SYR, 0.5ML (FLUAD)    5. Essential (primary) hypertension  refill  - amLODIPine (NORVASC) 10 MG tablet; Take 1 tablet by mouth daily  Dispense: 90 tablet; Refill: 3    6. Mixed bipolar I disorder (HCC)  Stable. Needs refill.   - ARIPiprazole (ABILIFY) 5 MG tablet; Take 1 tablet by mouth every evening  Dispense: 90 tablet; Refill: 3  - venlafaxine (EFFEXOR XR) 150 MG extended release capsule; Take 1 capsule by mouth daily  Dispense: 90 capsule; Refill: 3    7. Type 2 diabetes mellitus without complication, without long-term current use of insulin (HCC)  Refill.   - metFORMIN (GLUCOPHAGE-XR) 500 MG extended release tablet; Take 1 tablet by mouth daily (with breakfast)  Dispense: 90 tablet; Refill: 3    8. Mixed hyperlipidemia  refill  - atorvastatin (LIPITOR) 40 MG tablet; Take 1 tablet by mouth daily  Dispense: 90 tablet; Refill: 3    9. Gastroesophageal reflux disease without esophagitis  refill  - omeprazole (PRILOSEC) 20 MG delayed release capsule; Take 1 capsule by mouth 2 times daily  Dispense: 180 capsule; Refill: 3    10. Frequency of urination  Refill.   - tamsulosin (FLOMAX) 0.4 MG capsule; Take 1 capsule by mouth nightly  Dispense: 90 capsule; Refill: 3    11.  Chronic obstructive pulmonary disease, unspecified COPD type ([facility]  - tiotropium (SPIRIVA HANDIHALER) 18 MCG inhalation capsule; Inhale 1 capsule into the lungs daily  Dispense: 90 capsule; Refill: 3    12. Cough    - tiotropium (SPIRIVA HANDIHALER) 18 MCG inhalation capsule; Inhale 1 capsule into the lungs daily  Dispense: 90 capsule; Refill: 3                Return to Office: No follow-ups on file. Mckenzie Morelos DO       This document may have been prepared at least partially through the use of voice recognition software. Although effort is taken to assure the accuracy of this document, it is possible that grammatical, syntax,  or spelling errors may occur.

## 2020-10-12 NOTE — PROGRESS NOTES
S: 72 y.o. male with   Chief Complaint   Patient presents with    COPD     Perforomist $278 wants changed; using Pulmicort    Skin Lesion     left lower arm    Hip Pain     would like Prednisone refill       Pt was treated for COPD exaserbation last week. He finished all of his meds and has improved. BP Readings from Last 3 Encounters:   10/12/20 115/69   10/05/20 138/86   08/11/20 116/67       O: VS:  height is 6' (1.829 m) and weight is 194 lb (88 kg). His temporal temperature is 97.4 °F (36.3 °C). His blood pressure is 115/69 and his pulse is 58. His respiration is 18. As per resident note. Impression/Plan:   1) COPD exacerbation - improved. 2) sebaccous cyst - to derm  3) Prev - colonoscopy ordered. Flu shot given today. Health Maintenance Due   Topic Date Due    Colon cancer screen colonoscopy  03/30/2001    Shingles Vaccine (2 of 3) 08/03/2016    Annual Wellness Visit (AWV)  06/19/2019    Diabetic retinal exam  10/09/2019    Flu vaccine (1) 09/01/2020         Attending Physician Statement  I have discussed the case, including pertinent history and exam findings with the resident. I agree with the documented assessment and plan.       Tim Pineda MD

## 2020-10-15 RX ORDER — BUDESONIDE 0.25 MG/2ML
1 INHALANT ORAL 2 TIMES DAILY
Qty: 180 AMPULE | Refills: 3 | Status: SHIPPED
Start: 2020-10-15 | End: 2021-09-15

## 2020-10-15 NOTE — TELEPHONE ENCOUNTER
90-Day RX requested by pharmacy for Pulmicort    Requested Prescriptions     Pending Prescriptions Disp Refills    budesonide (PULMICORT) 0.25 MG/2ML nebulizer suspension 180 ampule 3     Sig: Take 2 mLs by nebulization 2 times daily

## 2020-10-19 ENCOUNTER — TELEPHONE (OUTPATIENT)
Dept: SURGERY | Age: 70
End: 2020-10-19

## 2020-10-19 ENCOUNTER — OFFICE VISIT (OUTPATIENT)
Dept: SURGERY | Age: 70
End: 2020-10-19

## 2020-10-19 VITALS
HEART RATE: 80 BPM | BODY MASS INDEX: 26.28 KG/M2 | DIASTOLIC BLOOD PRESSURE: 79 MMHG | WEIGHT: 194 LBS | TEMPERATURE: 97.7 F | HEIGHT: 72 IN | OXYGEN SATURATION: 94 % | SYSTOLIC BLOOD PRESSURE: 127 MMHG | RESPIRATION RATE: 24 BRPM

## 2020-10-19 PROCEDURE — 99999 PR OFFICE/OUTPT VISIT,PROCEDURE ONLY: CPT | Performed by: SURGERY

## 2020-10-19 NOTE — PATIENT INSTRUCTIONS
Patient Information and Instructions for Colonoscopy         Definition of Colonoscopy   A colonoscopy is the visual exam of the rectum and colon (large intestine). The exam is done with a tool called a colonoscope. The colonoscope is a flexible tube with a tiny camera on the end. This instrument allows the doctor to view the inside of your rectum and colon. Sigmoidoscopy is a shorter scope that views only the last one third of the colon. Reasons for Colonoscopy   It is used to examine, diagnose, and treat problems in your large intestine. The procedure is most often done for the following reasons: To determine the cause of abdominal pain, rectal bleeding, or a change in bowel habits   To detect and treat colon cancer or colon polyps   To obtain tissue samples for testing   To stop intestinal bleeding   Monitor response to treatment if you have inflammatory bowel disease     Possible Complications   Complications are rare, but no procedure is completely free of risk. If you are planning to have a colonoscopy, your doctor will review a list of possible complications, which may include:   Bleeding   Reaction to the sedation causing drop in your blood pressure or problems breathing  Perforation or puncture of the bowel     Factors that may increase the risk of complications include:   Pre-existing heart or kidney condition   Treatment with certain medicines, including aspirin and other drugs with anticoagulant or blood-thinning properties   Prior abdominal surgery or radiation treatments   Active colitis , diverticulitis , or other acute bowel disease   Previous treatment with radiation therapy     Be sure to discuss these risks with your doctor before the procedure.      What to Expect   Prior to Procedure   Your doctor will likely do the following:   Physical exam   Health history   Review of medicines   Test your stool for hidden blood (called \"occult blood\")     Your colon must be completely clean before the procedure. Any stool left in the intestine will block the view. This preparation may start several days before the procedure. Follow your doctor's instructions. Leading up to your procedure:   Talk to your doctor about your medicines. You may be asked to stop taking some medicines up to one week before the procedure, like:   Anti-inflammatory drugs (e.g., aspirin )   Blood thinners like clopidogrel (Plavix) or warfarin (Coumadin)   Iron supplements or vitamins containing iron   The day or days before your procedure, go on a clear liquid diet (clear broth, clear juice, clear jello) with no red coloring  Do not eat or drink anything after midnight. Wear comfortable clothing. If you have diabetes, ask your doctor if you need to adjust your diabetes medicine on the day prior to your procedure and the day of your procedure. Arrange for a ride home after the procedure. Anesthesia   You will receive intravenous sedation medicine for the procedure so you will not feel anything during the procedure. Description of the Procedure   You will lie on your left side with knees bent and drawn up toward your chest. The colonoscope will be slowly inserted through the rectum and into the bowel. The colonoscope will inject air into the colon. A small attached video camera will allow the doctor to view the colon's lining on a screen. The doctor will continue guiding the tool through the bowel and assess the lining. A tissue sample or polyps may be removed during the procedure. How Long Will It Take? Usually it takes about 30 to 45 minutes     Will It Hurt? Most people do not feel anything during the procedure and will not remember the procedure. After the procedure, gas pains and cramping are common. These pains should go away with the passing of gas. Post-procedure Care   If any tissue was removed: It will be sent to a lab to be examined. It may take 1-2 weeks for results.  The doctor will usually give an initial report after the scope is removed. Other tests may be recommended. A small amount of bleeding may occur during the first few days after the procedure. When you return home after the procedure, be sure to follow your doctor's instructions, which may include:   Resume medicines as instructed by your doctor. Resume normal diet, unless directed otherwise by your doctor. The sedative will make you drowsy. Avoid driving, operating machinery, or making important decisions for the rest of the day. Rest for the remainder of the day. After arriving home, contact your doctor if any of the following occurs:   Bleeding from your rectum, notify your doctor if you pass a teaspoonful of blood or more. Black, tarry stools   Severe abdominal pain   Hard, swollen abdomen   Signs of infection, including fever or chills   Inability to pass gas or stool   Coughing, shortness of breath, chest pain, severe nausea or vomiting     In case of an emergency, CALL 911 . [facility]  MAGNESIUM CITRATE  COLON PREP FOR COLONOSCOPY    It is very important that you follow all of the instructions listed on this sheet carefully (they may be slightly different than the directions on the product that you purchase at the pharmacy) to ensure that you colon is adequately cleaned out or you risk of complications could be increased. 2 Days or More Before Endoscopy:   Obtain two 10-ounce bottle of Magnesium Citrate from the pharmacy.  Do not eat corn, tomatoes, peas or watermelon 3 to 5 days before procedure.  If you are on INSULIN or OTHER DIABETIC MEDICATIONS, then check with your primary care physician as to how to adjust your medication while on clear liquid diet and when nothing by mouth. 1 Day Before the Endoscopy:   No solid food - only clear liquids (soup, jello, or juice that you can see through with no solid food) for breakfast, lunch and supper.   DO NOT drink or eat anything that is red or purple as it will turn the inside of the colon red and look like blood. Nothing to eat or drink after midnight.  Have at least 8 oz or more of clear liquids for breakfast (7am to 8am) and lunch (11:30am to 12:30pm).  1:00pm Drink at least 8oz of clear liquids.  2:00pm Drink at least 8oz of clear liquids.  3:00pm Drink at least 8oz of clear liquids.  4:00pm Drink at least 8oz of clear liquids.  5:00pm Drink a 10 oz bottle of Magnesium Citrate followed immediately by at least 8oz of clear liquids.  6:00pm Dinner - all clear liquids.  7:00pm Drink a 10oz bottle of Magnesium Citrate followed immediately by at least 8oz of clear liquids.  8:00pm Drink at least 8oz of clear liquids.  Can continue to take liquids until 12 midnight then nothing to eat or drink    Day of Endoscopy:   Nothing to eat or drink after midnight the night before the endoscopy. However, if you are taking any blood pressure medications or heart medications, you should take them with a sip of water. Instructions for Clear liquid diet  Definition  A clear liquid diet consists of clear liquids, such as water, broth and plain gelatin, that are easily digested and leave no undigested residue in your intestinal tract. Your doctor may prescribe a clear liquid diet before certain medical procedures or if you have certain digestive problems. Because a clear liquid diet can't provide you with adequate calories and nutrients, it shouldn't be continued for more than a few days. Purpose  A clear liquid diet is often used before tests, procedures or surgeries that require no food in your stomach or intestines, such as before colonoscopy. It may also be recommended as a short-term diet if you have certain digestive problems, such as nausea, vomiting or diarrhea, or after certain types of surgery.      Diet details  A clear liquid diet helps maintain adequate hydration, provides some important electrolytes, such as sodium and significant amount of fluid and can result in sickness due to dehydration. It's important that you prepare your body by drinking extra clear liquids before the prep. Stay hydrated by drinking all required clear liquids during the prep. Replenish your system by drinking clear liquids after returning home from your colonoscopy.

## 2020-10-20 RX ORDER — SODIUM CHLORIDE 9 MG/ML
INJECTION, SOLUTION INTRAVENOUS CONTINUOUS
Status: CANCELLED | OUTPATIENT
Start: 2020-10-20

## 2020-10-20 ASSESSMENT — ENCOUNTER SYMPTOMS
DIARRHEA: 0
BLOOD IN STOOL: 0
WHEEZING: 0
VOMITING: 0
BACK PAIN: 0
SORE THROAT: 0
ABDOMINAL PAIN: 0
PHOTOPHOBIA: 0
CONSTIPATION: 0
EYE REDNESS: 0
COUGH: 0
NAUSEA: 0
SHORTNESS OF BREATH: 0
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
## 2020-10-21 ENCOUNTER — OFFICE VISIT (OUTPATIENT)
Dept: FAMILY MEDICINE CLINIC | Age: 70
End: 2020-10-21
Payer: MEDICARE

## 2020-10-21 VITALS
DIASTOLIC BLOOD PRESSURE: 84 MMHG | HEART RATE: 74 BPM | HEIGHT: 72 IN | TEMPERATURE: 97.6 F | BODY MASS INDEX: 26.28 KG/M2 | OXYGEN SATURATION: 98 % | SYSTOLIC BLOOD PRESSURE: 132 MMHG | WEIGHT: 194 LBS | RESPIRATION RATE: 16 BRPM

## 2020-10-21 PROBLEM — M25.552 GREATER TROCHANTERIC PAIN SYNDROME OF LEFT LOWER EXTREMITY: Status: ACTIVE | Noted: 2020-10-21

## 2020-10-21 PROCEDURE — 20610 DRAIN/INJ JOINT/BURSA W/O US: CPT | Performed by: FAMILY MEDICINE

## 2020-10-21 ASSESSMENT — ENCOUNTER SYMPTOMS
SHORTNESS OF BREATH: 0
WHEEZING: 0
BLOOD IN STOOL: 0
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
## 2020-10-21 NOTE — PROGRESS NOTES
S: 72 y.o. male with   Chief Complaint   Patient presents with    Hip Pain     patient does not want injection    Skin Lesion     would like to discuss this; scheduled 11/2/20 with Dermatology       L greater troch pain - desires injection. BP Readings from Last 3 Encounters:   10/21/20 132/84   10/19/20 127/79   10/12/20 115/69       O: VS:  height is 6' (1.829 m) and weight is 194 lb (88 kg). His temporal temperature is 97.6 °F (36.4 °C). His blood pressure is 132/84 and his pulse is 74. His respiration is 16 and oxygen saturation is 98%. AAO/NAD, appropriate affect for mood  CV:  RRR, no murmur  Resp: CTAB  B/l L>>R trochanteric ttp, no redness/swelling    Impression/Plan:   1) Trochanteric Bursitis B/L  L>>R - injection under my direct supervision without immediate complications. Health Maintenance Due   Topic Date Due    Colon cancer screen colonoscopy  03/30/2001    Shingles Vaccine (2 of 3) 08/03/2016    Annual Wellness Visit (AWV)  06/19/2019    Diabetic retinal exam  10/09/2019         Attending Physician Statement  I have discussed the case, including pertinent history and exam findings with the resident. I also have seen the patient and performed key portions of the examination. I agree with the documented assessment and plan.       Rubens Mora MD

## 2020-10-22 ENCOUNTER — TELEPHONE (OUTPATIENT)
Dept: SURGERY | Age: 70
End: 2020-10-22

## 2020-10-22 RX ORDER — TRIAMCINOLONE ACETONIDE 40 MG/ML
40 INJECTION, SUSPENSION INTRA-ARTICULAR; INTRAMUSCULAR ONCE
Status: DISCONTINUED | OUTPATIENT
Start: 2020-10-22 | End: 2021-09-07 | Stop reason: HOSPADM

## 2020-10-22 RX ORDER — LIDOCAINE HYDROCHLORIDE 10 MG/ML
4 INJECTION, SOLUTION INFILTRATION; PERINEURAL ONCE
Status: SHIPPED | OUTPATIENT
Start: 2020-10-22

## 2020-10-27 ENCOUNTER — TELEPHONE (OUTPATIENT)
Dept: FAMILY MEDICINE CLINIC | Age: 70
End: 2020-10-27

## 2020-10-27 NOTE — TELEPHONE ENCOUNTER
Crista Gandara called to inform PCP they will not be proceeding with scheduling PFT or colonoscopy due to lack of transportation/concerns regarding COVID-19.

## 2020-11-04 ENCOUNTER — HOSPITAL ENCOUNTER (OUTPATIENT)
Dept: GENERAL RADIOLOGY | Age: 70
Discharge: HOME | End: 2020-11-06
Payer: MEDICARE

## 2020-11-04 ENCOUNTER — HOSPITAL ENCOUNTER (OUTPATIENT)
Age: 70
Discharge: HOME | End: 2020-11-06
Payer: MEDICARE

## 2020-11-04 ENCOUNTER — TELEPHONE (OUTPATIENT)
Dept: FAMILY MEDICINE CLINIC | Age: 70
End: 2020-11-04

## 2020-11-04 ENCOUNTER — OFFICE VISIT (OUTPATIENT)
Dept: FAMILY MEDICINE CLINIC | Age: 70
End: 2020-11-04
Payer: MEDICARE

## 2020-11-04 VITALS
BODY MASS INDEX: 26.28 KG/M2 | HEIGHT: 72 IN | OXYGEN SATURATION: 98 % | RESPIRATION RATE: 18 BRPM | DIASTOLIC BLOOD PRESSURE: 82 MMHG | WEIGHT: 194 LBS | SYSTOLIC BLOOD PRESSURE: 139 MMHG | HEART RATE: 79 BPM | TEMPERATURE: 97.9 F

## 2020-11-04 PROBLEM — M25.551 CHRONIC PAIN OF BOTH HIPS: Status: ACTIVE | Noted: 2020-11-04

## 2020-11-04 PROBLEM — G89.29 CHRONIC PAIN OF BOTH HIPS: Status: ACTIVE | Noted: 2020-11-04

## 2020-11-04 PROBLEM — M25.552 CHRONIC PAIN OF BOTH HIPS: Status: ACTIVE | Noted: 2020-11-04

## 2020-11-04 PROCEDURE — G8484 FLU IMMUNIZE NO ADMIN: HCPCS | Performed by: STUDENT IN AN ORGANIZED HEALTH CARE EDUCATION/TRAINING PROGRAM

## 2020-11-04 PROCEDURE — G8417 CALC BMI ABV UP PARAM F/U: HCPCS | Performed by: STUDENT IN AN ORGANIZED HEALTH CARE EDUCATION/TRAINING PROGRAM

## 2020-11-04 PROCEDURE — 73521 X-RAY EXAM HIPS BI 2 VIEWS: CPT

## 2020-11-04 PROCEDURE — 99213 OFFICE O/P EST LOW 20 MIN: CPT | Performed by: STUDENT IN AN ORGANIZED HEALTH CARE EDUCATION/TRAINING PROGRAM

## 2020-11-04 PROCEDURE — G8427 DOCREV CUR MEDS BY ELIG CLIN: HCPCS | Performed by: STUDENT IN AN ORGANIZED HEALTH CARE EDUCATION/TRAINING PROGRAM

## 2020-11-04 PROCEDURE — 3017F COLORECTAL CA SCREEN DOC REV: CPT | Performed by: STUDENT IN AN ORGANIZED HEALTH CARE EDUCATION/TRAINING PROGRAM

## 2020-11-04 PROCEDURE — 1123F ACP DISCUSS/DSCN MKR DOCD: CPT | Performed by: STUDENT IN AN ORGANIZED HEALTH CARE EDUCATION/TRAINING PROGRAM

## 2020-11-04 PROCEDURE — 4040F PNEUMOC VAC/ADMIN/RCVD: CPT | Performed by: STUDENT IN AN ORGANIZED HEALTH CARE EDUCATION/TRAINING PROGRAM

## 2020-11-04 PROCEDURE — 1036F TOBACCO NON-USER: CPT | Performed by: STUDENT IN AN ORGANIZED HEALTH CARE EDUCATION/TRAINING PROGRAM

## 2020-11-04 ASSESSMENT — ENCOUNTER SYMPTOMS
ABDOMINAL PAIN: 0
NAUSEA: 0
WHEEZING: 0
BACK PAIN: 1
SINUS PRESSURE: 0
COUGH: 1
SHORTNESS OF BREATH: 0
PHOTOPHOBIA: 0

## 2020-11-04 NOTE — TELEPHONE ENCOUNTER
Patient notified to call his insurance to find what company they will cover for a motorized scooter. He will call with the information.
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
## 2020-11-04 NOTE — TELEPHONE ENCOUNTER
----- Message from Kenyon Wiseman DO sent at 11/4/2020  1:56 PM EST -----  Regarding: Please call  Please call patient and see what company his insurance would want us to go through to get a motorized scooter. I think he used [employer] for his nebulizer. Thank you!

## 2020-11-08 ENCOUNTER — APPOINTMENT (OUTPATIENT)
Dept: CT IMAGING | Age: 70
End: 2020-11-08
Payer: MEDICARE

## 2020-11-08 ENCOUNTER — HOSPITAL ENCOUNTER (EMERGENCY)
Age: 70
Discharge: HOME | End: 2020-11-08
Attending: EMERGENCY MEDICINE
Payer: MEDICARE

## 2020-11-08 VITALS
BODY MASS INDEX: 26.28 KG/M2 | HEIGHT: 72 IN | RESPIRATION RATE: 14 BRPM | DIASTOLIC BLOOD PRESSURE: 86 MMHG | HEART RATE: 60 BPM | TEMPERATURE: 98.4 F | SYSTOLIC BLOOD PRESSURE: 120 MMHG | WEIGHT: 194 LBS | OXYGEN SATURATION: 96 %

## 2020-11-08 LAB
ALBUMIN SERPL-MCNC: 3.9 G/DL (ref 3.5–5.2)
ALP BLD-CCNC: 102 U/L (ref 40–129)
ALT SERPL-CCNC: 10 U/L (ref 0–40)
AMMONIA: 17.3 UMOL/L (ref 16–60)
ANION GAP SERPL CALCULATED.3IONS-SCNC: 7 MMOL/L (ref 7–16)
AST SERPL-CCNC: 9 U/L (ref 0–39)
BILIRUB SERPL-MCNC: 0.3 MG/DL (ref 0–1.2)
BILIRUBIN DIRECT: <0.2 MG/DL (ref 0–0.3)
BILIRUBIN URINE: NEGATIVE
BILIRUBIN, INDIRECT: NORMAL MG/DL (ref 0–1)
BLOOD, URINE: NEGATIVE
BUN BLDV-MCNC: 8 MG/DL (ref 8–23)
CALCIUM SERPL-MCNC: 8.8 MG/DL (ref 8.6–10.2)
CHLORIDE BLD-SCNC: 105 MMOL/L (ref 98–107)
CLARITY: CLEAR
CO2: 26 MMOL/L (ref 22–29)
COLOR: ABNORMAL
CREAT SERPL-MCNC: 0.8 MG/DL (ref 0.7–1.2)
EKG ATRIAL RATE: 65 BPM
EKG P AXIS: 50 DEGREES
EKG P-R INTERVAL: 172 MS
EKG Q-T INTERVAL: 430 MS
EKG QRS DURATION: 100 MS
EKG QTC CALCULATION (BAZETT): 447 MS
EKG R AXIS: -37 DEGREES
EKG T AXIS: 68 DEGREES
EKG VENTRICULAR RATE: 65 BPM
GFR AFRICAN AMERICAN: >60
GFR NON-AFRICAN AMERICAN: >60 ML/MIN/1.73
GLUCOSE BLD-MCNC: 107 MG/DL (ref 74–99)
GLUCOSE URINE: NEGATIVE MG/DL
HCT VFR BLD CALC: 44.7 % (ref 37–54)
HEMOGLOBIN: 15 G/DL (ref 12.5–16.5)
INR BLD: 0.9
KETONES, URINE: NEGATIVE MG/DL
LEUKOCYTE ESTERASE, URINE: NEGATIVE
LIPASE: 15 U/L (ref 13–60)
MCH RBC QN AUTO: 30.8 PG (ref 26–35)
MCHC RBC AUTO-ENTMCNC: 33.6 % (ref 32–34.5)
MCV RBC AUTO: 91.8 FL (ref 80–99.9)
NITRITE, URINE: NEGATIVE
PDW BLD-RTO: 15.2 FL (ref 11.5–15)
PH UA: 7 (ref 5–9)
PLATELET # BLD: 372 E9/L (ref 130–450)
PMV BLD AUTO: 10.5 FL (ref 7–12)
POTASSIUM SERPL-SCNC: 4.2 MMOL/L (ref 3.5–5)
PROTEIN UA: NEGATIVE MG/DL
PROTHROMBIN TIME: 10.6 SEC (ref 9.3–12.4)
RBC # BLD: 4.87 E12/L (ref 3.8–5.8)
SODIUM BLD-SCNC: 138 MMOL/L (ref 132–146)
SPECIFIC GRAVITY UA: 1.01 (ref 1–1.03)
TOTAL PROTEIN: 6.8 G/DL (ref 6.4–8.3)
TROPONIN: <0.01 NG/ML (ref 0–0.03)
UROBILINOGEN, URINE: 0.2 E.U./DL
WBC # BLD: 12.9 E9/L (ref 4.5–11.5)

## 2020-11-08 PROCEDURE — 81003 URINALYSIS AUTO W/O SCOPE: CPT

## 2020-11-08 PROCEDURE — 85610 PROTHROMBIN TIME: CPT

## 2020-11-08 PROCEDURE — 80076 HEPATIC FUNCTION PANEL: CPT

## 2020-11-08 PROCEDURE — 85027 COMPLETE CBC AUTOMATED: CPT

## 2020-11-08 PROCEDURE — 6360000004 HC RX CONTRAST MEDICATION: Performed by: RADIOLOGY

## 2020-11-08 PROCEDURE — 82140 ASSAY OF AMMONIA: CPT

## 2020-11-08 PROCEDURE — 74177 CT ABD & PELVIS W/CONTRAST: CPT

## 2020-11-08 PROCEDURE — 99284 EMERGENCY DEPT VISIT MOD MDM: CPT

## 2020-11-08 PROCEDURE — 80048 BASIC METABOLIC PNL TOTAL CA: CPT

## 2020-11-08 PROCEDURE — 36415 COLL VENOUS BLD VENIPUNCTURE: CPT

## 2020-11-08 PROCEDURE — 2580000003 HC RX 258: Performed by: EMERGENCY MEDICINE

## 2020-11-08 PROCEDURE — 83690 ASSAY OF LIPASE: CPT

## 2020-11-08 PROCEDURE — 93005 ELECTROCARDIOGRAM TRACING: CPT | Performed by: EMERGENCY MEDICINE

## 2020-11-08 PROCEDURE — 84484 ASSAY OF TROPONIN QUANT: CPT

## 2020-11-08 PROCEDURE — 93010 ELECTROCARDIOGRAM REPORT: CPT | Performed by: INTERNAL MEDICINE

## 2020-11-08 RX ORDER — SODIUM CHLORIDE 0.9 % (FLUSH) 0.9 %
10 SYRINGE (ML) INJECTION PRN
Status: DISCONTINUED | OUTPATIENT
Start: 2020-11-08 | End: 2020-11-08 | Stop reason: HOSPADM

## 2020-11-08 RX ORDER — NAPROXEN 500 MG/1
500 TABLET ORAL 2 TIMES DAILY
Qty: 60 TABLET | Refills: 0 | Status: SHIPPED | OUTPATIENT
Start: 2020-11-08 | End: 2020-12-07 | Stop reason: SDUPTHER

## 2020-11-08 RX ORDER — DOCUSATE SODIUM 100 MG/1
100 CAPSULE, LIQUID FILLED ORAL 2 TIMES DAILY
Qty: 30 CAPSULE | Refills: 0 | Status: SHIPPED | OUTPATIENT
Start: 2020-11-08

## 2020-11-08 RX ADMIN — IOPAMIDOL 75 ML: 755 INJECTION, SOLUTION INTRAVENOUS at 14:39

## 2020-11-08 RX ADMIN — Medication 10 ML: at 14:39

## 2020-11-08 ASSESSMENT — ENCOUNTER SYMPTOMS
SINUS PRESSURE: 0
BACK PAIN: 0
EYE DISCHARGE: 0
SHORTNESS OF BREATH: 0
COUGH: 0
SORE THROAT: 0
EYE PAIN: 0
VOMITING: 0
NAUSEA: 0
WHEEZING: 0
ABDOMINAL PAIN: 1
DIARRHEA: 0
EYE REDNESS: 0

## 2020-11-08 ASSESSMENT — PAIN DESCRIPTION - ONSET: ONSET: SUDDEN

## 2020-11-08 ASSESSMENT — PAIN DESCRIPTION - DESCRIPTORS: DESCRIPTORS: CONSTANT

## 2020-11-08 ASSESSMENT — PAIN DESCRIPTION - ORIENTATION: ORIENTATION: LEFT

## 2020-11-08 ASSESSMENT — PAIN DESCRIPTION - LOCATION: LOCATION: ABDOMEN

## 2020-11-08 ASSESSMENT — PAIN DESCRIPTION - PROGRESSION: CLINICAL_PROGRESSION: GRADUALLY WORSENING

## 2020-11-08 ASSESSMENT — PAIN SCALES - GENERAL: PAINLEVEL_OUTOF10: 9

## 2020-11-08 ASSESSMENT — PAIN DESCRIPTION - PAIN TYPE: TYPE: ACUTE PAIN

## 2020-11-08 NOTE — ED NOTES
Pt states he is unable to provide urine at this time, just went prior to coming in.  Will attempt later     Dania Casillas RN  11/08/20 2947

## 2020-11-08 NOTE — ED PROVIDER NOTES
SILAS Peraza is a 67-year-old male with a past medical history of COPD and Paget's disease of the bone who presents with a chief complaint of left flank and abdominal pain. History comes primarily from the patient. He states that he got a steroid shot approximately 4 weeks ago in his hip secondary to Paget's disease, and the day after getting the steroid shot he was ambulating in a store and his hip gave out on him causing him to fall. He states that he had all over soreness and pain after this fall, however the pain in his abdomen and left flank persisted. He states that his abdomen is now more protuberant than it was previously, and that he is having exquisite tenderness to palpation of that area. He also endorses decreased abdominal intake, decreased bowel movement frequency during this time. He states that he has been essentially convalescing at home lying on the sofa all day, and only gets up to eat and go back and forth to the restroom. Because of the persistence of his symptoms he presented to [facility] emergency department for further evaluation and treatment. On arrival, he was assessed with history, physical exam, imaging studies, laboratory studies, EKG, vital signs. Vital signs are stable on arrival and he was afebrile. Review of Systems   Constitutional: Negative for chills and fever. HENT: Negative for ear pain, sinus pressure and sore throat. Eyes: Negative for pain, discharge and redness. Respiratory: Negative for cough, shortness of breath and wheezing. Cardiovascular: Negative for chest pain. Gastrointestinal: Positive for abdominal pain. Negative for diarrhea, nausea and vomiting. Genitourinary: Positive for flank pain. Negative for dysuria and frequency. Musculoskeletal: Negative for arthralgias and back pain. Skin: Negative for rash and wound. Neurological: Negative for weakness and headaches. Hematological: Negative for adenopathy. All other systems reviewed and are negative. Physical Exam  Vitals signs and nursing note reviewed. Constitutional:       General: He is not in acute distress. Appearance: He is well-developed. He is not diaphoretic. HENT:      Head: Normocephalic and atraumatic. Mouth/Throat:      Dentition: Abnormal dentition. Eyes:      Pupils: Pupils are equal, round, and reactive to light. Neck:      Musculoskeletal: Normal range of motion. Vascular: No JVD. Trachea: No tracheal deviation. Cardiovascular:      Rate and Rhythm: Regular rhythm. Heart sounds: No murmur. No friction rub. No gallop. Pulmonary:      Effort: Pulmonary effort is normal. No respiratory distress. Breath sounds: Normal breath sounds. No stridor. No wheezing or rales. Chest:      Chest wall: No tenderness. Abdominal:      General: Bowel sounds are normal. There is no distension. Palpations: Abdomen is soft. Tenderness: There is abdominal tenderness in the left upper quadrant. There is left CVA tenderness. There is no guarding. Musculoskeletal: Normal range of motion. Skin:     General: Skin is warm and dry. Neurological:      Mental Status: He is alert. Cranial Nerves: No cranial nerve deficit. Psychiatric:         Behavior: Behavior normal.          Procedures     MDM  Number of Diagnoses or Management Options  Complex renal cyst:   Constipation, unspecified constipation type:   Left upper quadrant abdominal pain:   Diagnosis management comments: The patient's emergency department work-up including history, physical exam, vital signs, laboratory studies, imaging studies and reevaluation after initial treatment are reassuring for discharge to home with close outpatient follow-up. Specifically, Alyssia Schumacher had no significant laboratory abnormality findings or pathophysiology required emergency department admission to the inpatient setting.   His vital signs remained stable throughout the entirety of his emergency department stay. He did have an incidental finding of a complex renal cyst on the right which requires further assessment with a higher level of imaging in the outpatient setting to delineate its nature. This finding was relayed to the patient who did understand the plan of care and will follow up with his primary care provider for this imaging. They were advised to return to the emergency department should they develop fever, chills, night sweats, nausea, vomiting, diarrhea, chest pain, shortness of breath, or worsening of their symptoms despite treatment from this emergency department visit. They were instructed to follow-up with their primary care provider in 2 days. This information was relayed to the patient who understood this plan of care and was amenable to the plan.                  --------------------------------------------- PAST HISTORY ---------------------------------------------  Past Medical History:  has a past medical history of Anxiety, [facility] Depression, GERD (gastroesophageal reflux disease), Headache, Hyperlipidemia, Hypertension, and Substance abuse ([facility] Past Surgical History:  has a past surgical history that includes other surgical history. Social History:  reports that he has never smoked. He has never used smokeless tobacco. He reports current drug use. Frequency: 7.00 times per week. Drug: Marijuana. He reports that he does not drink alcohol. Family History: family history includes Cancer in his mother and paternal grandfather; Diabetes in his brother, paternal grandmother, and sister; No Known Problems in his father. The patients home medications have been reviewed.     Allergies: Penicillins    -------------------------------------------------- RESULTS -------------------------------------------------  Labs:  Results for orders placed or performed during the hospital encounter of 56/70/82   Basic metabolic panel   Result Value Ref Range    Sodium 138 132 - 146 mmol/L    Potassium 4.2 3.5 - 5.0 mmol/L    Chloride 105 98 - 107 mmol/L    CO2 26 22 - 29 mmol/L    Anion Gap 7 7 - 16 mmol/L    Glucose 107 (H) 74 - 99 mg/dL    BUN 8 8 - 23 mg/dL    CREATININE 0.8 0.7 - 1.2 mg/dL    GFR Non-African American >60 >=60 mL/min/1.73    GFR African American >60     Calcium 8.8 8.6 - 10.2 mg/dL   CBC   Result Value Ref Range    WBC 12.9 (H) 4.5 - 11.5 E9/L    RBC 4.87 3.80 - 5.80 E12/L    Hemoglobin 15.0 12.5 - 16.5 g/dL    Hematocrit 44.7 37.0 - 54.0 %    MCV 91.8 80.0 - 99.9 fL    MCH 30.8 26.0 - 35.0 pg    MCHC 33.6 32.0 - 34.5 %    RDW 15.2 (H) 11.5 - 15.0 fL    Platelets 422 228 - 443 E9/L    MPV 10.5 7.0 - 12.0 fL   Troponin   Result Value Ref Range    Troponin <0.01 0.00 - 0.03 ng/mL   Lipase   Result Value Ref Range    Lipase 15 13 - 60 U/L   Urinalysis   Result Value Ref Range    Color, UA DARK YELLOW (A) Straw/Yellow    Clarity, UA Clear Clear    Glucose, Ur Negative Negative mg/dL    Bilirubin Urine Negative Negative    Ketones, Urine Negative Negative mg/dL    Specific Gravity, UA 1.015 1.005 - 1.030    Blood, Urine Negative Negative    pH, UA 7.0 5.0 - 9.0    Protein, UA Negative Negative mg/dL    Urobilinogen, Urine 0.2 <2.0 E.U./dL    Nitrite, Urine Negative Negative    Leukocyte Esterase, Urine Negative Negative   Hepatic Function Panel   Result Value Ref Range    Total Protein 6.8 6.4 - 8.3 g/dL    Alb 3.9 3.5 - 5.2 g/dL    Alkaline Phosphatase 102 40 - 129 U/L    ALT 10 0 - 40 U/L    AST 9 0 - 39 U/L    Total Bilirubin 0.3 0.0 - 1.2 mg/dL    Bilirubin, Direct <0.2 0.0 - 0.3 mg/dL    Bilirubin, Indirect see below 0.0 - 1.0 mg/dL   Protime-INR   Result Value Ref Range    Protime 10.6 9.3 - 12.4 sec    INR 0.9    Ammonia   Result Value Ref Range    Ammonia 17.3 16.0 - 60.0 umol/L   EKG 12 Lead   Result Value Ref Range    Ventricular Rate 65 BPM    Atrial Rate 65 BPM    P-R Interval 172 ms    QRS Duration 100 ms    Q-T Interval 430 ms    QTc Calculation (Bazett) 447 ms    P Axis 50 degrees    R Axis -37 degrees    T Axis 68 degrees       Radiology:  CT ABDOMEN PELVIS W IV CONTRAST Additional Contrast? None   Final Result   1. 1.5 cm complex right renal cyst.  Recommend nonemergent CT of the abdomen   with without contrast using renal mass protocol.             ------------------------- NURSING NOTES AND VITALS REVIEWED ---------------------------  Date / Time Roomed:  11/8/2020  1:14 PM  ED Bed Assignment:  26/26    The nursing notes within the ED encounter and vital signs as below have been reviewed. /77   Pulse 63   Temp 98.4 °F (36.9 °C) (Temporal)   Resp 14   Ht 6' (1.829 m)   Wt 194 lb (88 kg)   SpO2 96%   BMI 26.31 kg/m²   Oxygen Saturation Interpretation: Normal      ------------------------------------------ PROGRESS NOTES ------------------------------------------  3:25 PM EST  I have spoken with the patient and discussed todays results, in addition to providing specific details for the plan of care and counseling regarding the diagnosis and prognosis. Their questions are answered at this time and they are agreeable with the plan. I discussed at length with them reasons for immediate return here for re evaluation. They will followup with their primary care physician by calling their office tomorrow. --------------------------------- ADDITIONAL PROVIDER NOTES ---------------------------------  At this time the patient is without objective evidence of an acute process requiring hospitalization or inpatient management. They have remained hemodynamically stable throughout their entire ED visit and are stable for discharge with outpatient follow-up. The plan has been discussed in detail and they are aware of the specific conditions for emergent return, as well as the importance of follow-up.       New Prescriptions    DOCUSATE SODIUM (COLACE) 100 MG CAPSULE    Take 1 capsule by mouth 2 times daily    NAPROXEN (NAPROSYN) 500 MG TABLET    Take 1 tablet by mouth 2 times daily       Diagnosis:  1. Left upper quadrant abdominal pain    2. Constipation, unspecified constipation type    3. Complex renal cyst        Disposition:  Patient's disposition: Discharge to home  Patient's condition is stable.              Varun Moreno,   Resident  11/08/20 [id number]

## 2020-11-09 ENCOUNTER — TELEPHONE (OUTPATIENT)
Dept: FAMILY MEDICINE CLINIC | Age: 70
End: 2020-11-09

## 2020-11-09 NOTE — TELEPHONE ENCOUNTER
Patient and his wife notified about referral, they will call next week if they do not hear from the physician.   Urology  [phone]

## 2020-11-09 NOTE — PROGRESS NOTES
Patient needs follow up with urology due to finding of cyst on his kidney. I will put the referral in.

## 2020-11-09 NOTE — TELEPHONE ENCOUNTER
----- Message from Jaylyn Sales DO sent at 11/9/2020  8:39 AM EST -----    Patient needs follow up with urology due to finding of cyst on his kidney. I will put the referral in.

## 2020-11-27 ENCOUNTER — TELEPHONE (OUTPATIENT)
Dept: FAMILY MEDICINE CLINIC | Age: 70
End: 2020-11-27

## 2020-11-27 NOTE — TELEPHONE ENCOUNTER
Patient fell down the stairs, he is having a lot of pain in the left upper side again. Said that it was feeling better from previous fall. Wanted to know if dr can order an xray. I advised that he needs to be seen through the emergency room or urgent care if he is in that much pain. Patient states he will wait until his appointment Tuesday.

## 2020-12-01 ENCOUNTER — OFFICE VISIT (OUTPATIENT)
Dept: FAMILY MEDICINE CLINIC | Age: 70
End: 2020-12-01
Payer: MEDICARE

## 2020-12-01 VITALS
TEMPERATURE: 97.6 F | BODY MASS INDEX: 26.31 KG/M2 | WEIGHT: 194 LBS | DIASTOLIC BLOOD PRESSURE: 82 MMHG | HEART RATE: 78 BPM | SYSTOLIC BLOOD PRESSURE: 137 MMHG

## 2020-12-01 PROCEDURE — 1036F TOBACCO NON-USER: CPT | Performed by: STUDENT IN AN ORGANIZED HEALTH CARE EDUCATION/TRAINING PROGRAM

## 2020-12-01 PROCEDURE — G8417 CALC BMI ABV UP PARAM F/U: HCPCS | Performed by: STUDENT IN AN ORGANIZED HEALTH CARE EDUCATION/TRAINING PROGRAM

## 2020-12-01 PROCEDURE — 3017F COLORECTAL CA SCREEN DOC REV: CPT | Performed by: STUDENT IN AN ORGANIZED HEALTH CARE EDUCATION/TRAINING PROGRAM

## 2020-12-01 PROCEDURE — G8484 FLU IMMUNIZE NO ADMIN: HCPCS | Performed by: STUDENT IN AN ORGANIZED HEALTH CARE EDUCATION/TRAINING PROGRAM

## 2020-12-01 PROCEDURE — 1123F ACP DISCUSS/DSCN MKR DOCD: CPT | Performed by: STUDENT IN AN ORGANIZED HEALTH CARE EDUCATION/TRAINING PROGRAM

## 2020-12-01 PROCEDURE — 4040F PNEUMOC VAC/ADMIN/RCVD: CPT | Performed by: STUDENT IN AN ORGANIZED HEALTH CARE EDUCATION/TRAINING PROGRAM

## 2020-12-01 PROCEDURE — G8428 CUR MEDS NOT DOCUMENT: HCPCS | Performed by: STUDENT IN AN ORGANIZED HEALTH CARE EDUCATION/TRAINING PROGRAM

## 2020-12-01 PROCEDURE — 99213 OFFICE O/P EST LOW 20 MIN: CPT | Performed by: STUDENT IN AN ORGANIZED HEALTH CARE EDUCATION/TRAINING PROGRAM

## 2020-12-01 ASSESSMENT — ENCOUNTER SYMPTOMS
NAUSEA: 0
BACK PAIN: 1
VOMITING: 0
ABDOMINAL PAIN: 1

## 2020-12-01 NOTE — PROGRESS NOTES
S: 72 y.o. male with No chief complaint on file. Needs physical for power wheelchair  Stroke around 2000, gait disturbance since stroke  Worsening arthritis in hips, upper extremities  Recent falls  Arthritis is wrist making cane difficult    BP Readings from Last 3 Encounters:   12/01/20 137/82   11/08/20 120/86   11/04/20 139/82       O: VS:  weight is 194 lb (88 kg). His temperature is 97.6 °F (36.4 °C). His blood pressure is 137/82 and his pulse is 78. AAO/NAD, appropriate affect for mood  Resp: No resp distress  MS: Decreased ROM and strength 4/5 b/l LE, decreased reflexes patellar, b/l hip tenderness, prolonged get-up-and-go    Impression/Plan:   1) Ambulatory dysfunction  2) Frequent falls  3) DJD--multiple joints  4) H/o of stroke    PT referral for balance and strengthening. Complete paperwork for power mobility device. Health Maintenance Due   Topic Date Due    Colon cancer screen colonoscopy  03/30/2001    Shingles Vaccine (2 of 3) 08/03/2016    Annual Wellness Visit (AWV)  06/19/2019    Diabetic retinal exam  10/09/2019         Attending Physician Statement  I have discussed the case, including pertinent history and exam findings with the resident. I agree with the documented assessment and plan.       Jennie nAtunez MD
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
## 2020-12-07 ENCOUNTER — OFFICE VISIT (OUTPATIENT)
Dept: FAMILY MEDICINE CLINIC | Age: 70
End: 2020-12-07
Payer: MEDICARE

## 2020-12-07 ENCOUNTER — HOSPITAL ENCOUNTER (OUTPATIENT)
Age: 70
Discharge: HOME | End: 2020-12-09
Payer: MEDICARE

## 2020-12-07 ENCOUNTER — HOSPITAL ENCOUNTER (OUTPATIENT)
Dept: GENERAL RADIOLOGY | Age: 70
Discharge: HOME | End: 2020-12-09
Payer: MEDICARE

## 2020-12-07 VITALS
HEIGHT: 72 IN | WEIGHT: 186.4 LBS | BODY MASS INDEX: 25.25 KG/M2 | TEMPERATURE: 98 F | DIASTOLIC BLOOD PRESSURE: 80 MMHG | RESPIRATION RATE: 16 BRPM | HEART RATE: 80 BPM | OXYGEN SATURATION: 98 % | SYSTOLIC BLOOD PRESSURE: 136 MMHG

## 2020-12-07 PROCEDURE — 1123F ACP DISCUSS/DSCN MKR DOCD: CPT | Performed by: STUDENT IN AN ORGANIZED HEALTH CARE EDUCATION/TRAINING PROGRAM

## 2020-12-07 PROCEDURE — G8484 FLU IMMUNIZE NO ADMIN: HCPCS | Performed by: STUDENT IN AN ORGANIZED HEALTH CARE EDUCATION/TRAINING PROGRAM

## 2020-12-07 PROCEDURE — 99213 OFFICE O/P EST LOW 20 MIN: CPT | Performed by: STUDENT IN AN ORGANIZED HEALTH CARE EDUCATION/TRAINING PROGRAM

## 2020-12-07 PROCEDURE — G8417 CALC BMI ABV UP PARAM F/U: HCPCS | Performed by: STUDENT IN AN ORGANIZED HEALTH CARE EDUCATION/TRAINING PROGRAM

## 2020-12-07 PROCEDURE — G0438 PPPS, INITIAL VISIT: HCPCS | Performed by: STUDENT IN AN ORGANIZED HEALTH CARE EDUCATION/TRAINING PROGRAM

## 2020-12-07 PROCEDURE — 4040F PNEUMOC VAC/ADMIN/RCVD: CPT | Performed by: STUDENT IN AN ORGANIZED HEALTH CARE EDUCATION/TRAINING PROGRAM

## 2020-12-07 PROCEDURE — 3017F COLORECTAL CA SCREEN DOC REV: CPT | Performed by: STUDENT IN AN ORGANIZED HEALTH CARE EDUCATION/TRAINING PROGRAM

## 2020-12-07 PROCEDURE — 73502 X-RAY EXAM HIP UNI 2-3 VIEWS: CPT

## 2020-12-07 PROCEDURE — 1036F TOBACCO NON-USER: CPT | Performed by: STUDENT IN AN ORGANIZED HEALTH CARE EDUCATION/TRAINING PROGRAM

## 2020-12-07 PROCEDURE — G8427 DOCREV CUR MEDS BY ELIG CLIN: HCPCS | Performed by: STUDENT IN AN ORGANIZED HEALTH CARE EDUCATION/TRAINING PROGRAM

## 2020-12-07 RX ORDER — PREDNISONE 20 MG/1
20 TABLET ORAL DAILY
Qty: 5 TABLET | Refills: 0 | Status: SHIPPED | OUTPATIENT
Start: 2020-12-07 | End: 2020-12-12

## 2020-12-07 RX ORDER — NAPROXEN 500 MG/1
500 TABLET ORAL 2 TIMES DAILY WITH MEALS
Qty: 60 TABLET | Refills: 0 | Status: SHIPPED
Start: 2020-12-07 | End: 2020-12-22 | Stop reason: SDUPTHER

## 2020-12-07 ASSESSMENT — LIFESTYLE VARIABLES: HOW OFTEN DO YOU HAVE A DRINK CONTAINING ALCOHOL: 0

## 2020-12-07 ASSESSMENT — PATIENT HEALTH QUESTIONNAIRE - PHQ9
6. FEELING BAD ABOUT YOURSELF - OR THAT YOU ARE A FAILURE OR HAVE LET YOURSELF OR YOUR FAMILY DOWN: 0
10. IF YOU CHECKED OFF ANY PROBLEMS, HOW DIFFICULT HAVE THESE PROBLEMS MADE IT FOR YOU TO DO YOUR WORK, TAKE CARE OF THINGS AT HOME, OR GET ALONG WITH OTHER PEOPLE: 0
4. FEELING TIRED OR HAVING LITTLE ENERGY: 3
5. POOR APPETITE OR OVEREATING: 3
3. TROUBLE FALLING OR STAYING ASLEEP: 3
1. LITTLE INTEREST OR PLEASURE IN DOING THINGS: 3
SUM OF ALL RESPONSES TO PHQ QUESTIONS 1-9: 17
8. MOVING OR SPEAKING SO SLOWLY THAT OTHER PEOPLE COULD HAVE NOTICED. OR THE OPPOSITE, BEING SO FIGETY OR RESTLESS THAT YOU HAVE BEEN MOVING AROUND A LOT MORE THAN USUAL: 1
7. TROUBLE CONCENTRATING ON THINGS, SUCH AS READING THE NEWSPAPER OR WATCHING TELEVISION: 1
SUM OF ALL RESPONSES TO PHQ QUESTIONS 1-9: 17
SUM OF ALL RESPONSES TO PHQ QUESTIONS 1-9: 17
SUM OF ALL RESPONSES TO PHQ9 QUESTIONS 1 & 2: 6
9. THOUGHTS THAT YOU WOULD BE BETTER OFF DEAD, OR OF HURTING YOURSELF: 0
2. FEELING DOWN, DEPRESSED OR HOPELESS: 3

## 2020-12-07 ASSESSMENT — COLUMBIA-SUICIDE SEVERITY RATING SCALE - C-SSRS
1. WITHIN THE PAST MONTH, HAVE YOU WISHED YOU WERE DEAD OR WISHED YOU COULD GO TO SLEEP AND NOT WAKE UP?: NO
2. HAVE YOU ACTUALLY HAD ANY THOUGHTS OF KILLING YOURSELF?: NO
6. HAVE YOU EVER DONE ANYTHING, STARTED TO DO ANYTHING, OR PREPARED TO DO ANYTHING TO END YOUR LIFE?: NO

## 2020-12-07 NOTE — PROGRESS NOTES
Subjective: Manjeet Conteh comes in for a AWV and has no real major issues. He has some underlying medical problems that were not germane to the visit today. He mentioned acute hip pain on the right. He has known Paget's disease of the bone. ROS: Otherwise negative    Patient Active Problem List   Diagnosis    At high risk for injury related to fall    History of traumatic head injury    Post-traumatic headache    Hyperlipidemia    Essential (primary) hypertension    GERD (gastroesophageal reflux disease)    Frequency of urination    Major depression, chronic    Bilateral carotid artery stenosis    Type 2 diabetes mellitus without complication, without long-term current use of insulin (HCC)    COPD (chronic obstructive pulmonary disease) (HCC)    Greater trochanteric pain syndrome of left lower extremity    Chronic pain of both hips       Past medical, surgical, family and social history were reviewed, non-contributory, and unchanged unless otherwise stated. Objective:    /80 (Site: Left Upper Arm, Position: Sitting, Cuff Size: Medium Adult)   Pulse 80   Temp 98 °F (36.7 °C) (Temporal)   Resp 16   Ht 6' (1.829 m)   Wt 186 lb 6.4 oz (84.6 kg)   SpO2 98%   BMI 25.28 kg/m²     Exam is as noted by resident with the following changes, additions or corrections:      Assessment/Plan:        Manjeet Conteh was seen today for medicare awv. Diagnoses and all orders for this visit:    Right hip pain  -     XR HIP RIGHT (2-3 VIEWS); Future  -     Merc - Zofia Burton MD, [facility], [place]  -     naproxen (NAPROSYN) 500 MG tablet; Take 1 tablet by mouth 2 times daily (with meals)  -     predniSONE (DELTASONE) 20 MG tablet; Take 1 tablet by mouth daily for 5 days    Routine general medical examination at a health care facility    Positive depression screening           Attending Physician Statement    I have reviewed the chart, including any radiology or labs.  I have discussed the case, including pertinent history and exam findings with the resident. I agree with the assessment, plan and orders as documented by the resident. Please refer to the resident note for additional information.       Electronically signed by Natali Diaz DO on 12/10/2020 at 8:34 AM

## 2020-12-07 NOTE — PATIENT INSTRUCTIONS
Max 3000mg tylenol/acetaminophen daily - each extra strength tylenol is 500mg. Use naproxen daily as needed but do not combine with advil/motrin/ibuprofen. Personalized Preventive Plan for Jailene Grout - 12/7/2020  Medicare offers a range of preventive health benefits. Some of the tests and screenings are paid in full while other may be subject to a deductible, co-insurance, and/or copay. Some of these benefits include a comprehensive review of your medical history including lifestyle, illnesses that may run in your family, and various assessments and screenings as appropriate. After reviewing your medical record and screening and assessments performed today your provider may have ordered immunizations, labs, imaging, and/or referrals for you. A list of these orders (if applicable) as well as your Preventive Care list are included within your After Visit Summary for your review. Other Preventive Recommendations:    · A preventive eye exam performed by an eye specialist is recommended every 1-2 years to screen for glaucoma; cataracts, macular degeneration, and other eye disorders. · A preventive dental visit is recommended every 6 months. · Try to get at least 150 minutes of exercise per week or 10,000 steps per day on a pedometer . · Order or download the FREE \"Exercise & Physical Activity: Your Everyday Guide\" from [employer]. Call 8-788.748.1334 or search [employer] online. · You need 4115-5503 mg of calcium and 9563-4686 IU of vitamin D per day. It is possible to meet your calcium requirement with diet alone, but a vitamin D supplement is usually necessary to meet this goal.  · When exposed to the sun, use a sunscreen that protects against both UVA and UVB radiation with an SPF of 30 or greater. Reapply every 2 to 3 hours or after sweating, drying off with a towel, or swimming. · Always wear a seat belt when traveling in a car.  Always wear a
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
## 2020-12-08 ENCOUNTER — TELEPHONE (OUTPATIENT)
Dept: FAMILY MEDICINE CLINIC | Age: 70
End: 2020-12-08

## 2020-12-08 NOTE — TELEPHONE ENCOUNTER
Spouse called requesting XR results from 12/7/20.  Advised her I will ask provider to review & we will call them to discuss

## 2020-12-08 NOTE — TELEPHONE ENCOUNTER
Attempted to call back but no answer. Xray does not show any new changes from previous. No fracture. Recommend he follows up with orthopedics as previously discussed at last appointment.

## 2020-12-09 ASSESSMENT — ENCOUNTER SYMPTOMS
VOMITING: 0
NAUSEA: 0
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
## 2020-12-13 RX ORDER — ALBUTEROL SULFATE 90 UG/1
2 AEROSOL, METERED RESPIRATORY (INHALATION) EVERY 6 HOURS PRN
Qty: 3 INHALER | Refills: 3 | Status: SHIPPED
Start: 2020-12-13 | End: 2021-08-27 | Stop reason: SDUPTHER

## 2020-12-16 ENCOUNTER — OFFICE VISIT (OUTPATIENT)
Dept: ORTHOPEDIC SURGERY | Age: 70
End: 2020-12-16
Payer: MEDICARE

## 2020-12-16 VITALS — WEIGHT: 194 LBS | TEMPERATURE: 97.3 F | BODY MASS INDEX: 26.28 KG/M2 | HEIGHT: 72 IN

## 2020-12-16 PROCEDURE — 1036F TOBACCO NON-USER: CPT | Performed by: ORTHOPAEDIC SURGERY

## 2020-12-16 PROCEDURE — 3017F COLORECTAL CA SCREEN DOC REV: CPT | Performed by: ORTHOPAEDIC SURGERY

## 2020-12-16 PROCEDURE — G8427 DOCREV CUR MEDS BY ELIG CLIN: HCPCS | Performed by: ORTHOPAEDIC SURGERY

## 2020-12-16 PROCEDURE — 1123F ACP DISCUSS/DSCN MKR DOCD: CPT | Performed by: ORTHOPAEDIC SURGERY

## 2020-12-16 PROCEDURE — 4040F PNEUMOC VAC/ADMIN/RCVD: CPT | Performed by: ORTHOPAEDIC SURGERY

## 2020-12-16 PROCEDURE — 99203 OFFICE O/P NEW LOW 30 MIN: CPT | Performed by: ORTHOPAEDIC SURGERY

## 2020-12-16 PROCEDURE — G8484 FLU IMMUNIZE NO ADMIN: HCPCS | Performed by: ORTHOPAEDIC SURGERY

## 2020-12-16 PROCEDURE — G8417 CALC BMI ABV UP PARAM F/U: HCPCS | Performed by: ORTHOPAEDIC SURGERY

## 2020-12-16 NOTE — PROGRESS NOTES
Frequency: 7.0 times per week     Types: Marijuana    Sexual activity: Not Currently       CURRENT MEDICATIONS     Current Outpatient Medications:     albuterol sulfate HFA (VENTOLIN HFA) 108 (90 Base) MCG/ACT inhaler, Inhale 2 puffs into the lungs every 6 hours as needed for Wheezing, Disp: 3 Inhaler, Rfl: 3    naproxen (NAPROSYN) 500 MG tablet, Take 1 tablet by mouth 2 times daily (with meals), Disp: 60 tablet, Rfl: 0    docusate sodium (COLACE) 100 MG capsule, Take 1 capsule by mouth 2 times daily, Disp: 30 capsule, Rfl: 0    budesonide (PULMICORT) 0.25 MG/2ML nebulizer suspension, Take 2 mLs by nebulization 2 times daily, Disp: 180 ampule, Rfl: 3    ipratropium-albuterol (DUONEB) 0.5-2.5 (3) MG/3ML SOLN nebulizer solution, Inhale 3 mLs into the lungs every 6 hours as needed for Shortness of Breath, Disp: 360 mL, Rfl: 0    amLODIPine (NORVASC) 10 MG tablet, Take 1 tablet by mouth daily, Disp: 90 tablet, Rfl: 3    ARIPiprazole (ABILIFY) 5 MG tablet, Take 1 tablet by mouth every evening, Disp: 90 tablet, Rfl: 3    metFORMIN (GLUCOPHAGE-XR) 500 MG extended release tablet, Take 1 tablet by mouth daily (with breakfast), Disp: 90 tablet, Rfl: 3    atorvastatin (LIPITOR) 40 MG tablet, Take 1 tablet by mouth daily, Disp: 90 tablet, Rfl: 3    omeprazole (PRILOSEC) 20 MG delayed release capsule, Take 1 capsule by mouth 2 times daily, Disp: 180 capsule, Rfl: 3    tamsulosin (FLOMAX) 0.4 MG capsule, Take 1 capsule by mouth nightly, Disp: 90 capsule, Rfl: 3    tiotropium (SPIRIVA HANDIHALER) 18 MCG inhalation capsule, Inhale 1 capsule into the lungs daily, Disp: 90 capsule, Rfl: 3    venlafaxine (EFFEXOR XR) 150 MG extended release capsule, Take 1 capsule by mouth daily, Disp: 90 capsule, Rfl: 3    sucralfate (CARAFATE) 1 GM tablet, Take 1 g by mouth 2 times daily (before meals), Disp: , Rfl:   blood glucose monitor strips, Test one times a day & as needed for symptoms of irregular blood glucose., Disp: 100 strip, Rfl: 1    budesonide-formoterol (SYMBICORT) 80-4.5 MCG/ACT AERO, Inhale 2 puffs into the lungs 2 times daily, Disp: 3 Inhaler, Rfl: 0    famotidine (PEPCID) 20 MG tablet, Take 1 tablet by mouth 2 times daily, Disp: 60 tablet, Rfl: 0    Lancets MISC, Use lancet to check sugar in the AM before meals. , Disp: 100 each, Rfl: 3    polyethylene glycol (GLYCOLAX) powder, Take 17 g by mouth daily , Disp: , Rfl:     aspirin 81 MG tablet, Take 81 mg by mouth daily, Disp: , Rfl:     ALLERGIES  Allergies   Allergen Reactions    Penicillins Hives       Controlled Substances Monitoring:      REVIEW OF SYSTEMS:     General: Negative Fever, chills, fatigue   Cardiovascular: Negative chest pain, palpitations  Respiratory: Equal chest expansion, negative shortness of breath   Skin: Negative rash, open wounds  Psych: Normal affect, mood stable  Neurologic: sensation grossly intact in extremities   Musculoskeletal: See HPI      PHYSICAL EXAM     Vitals:    12/16/20 1318   Temp: 97.3 °F (36.3 °C)   TempSrc: Infrared   Weight: 194 lb (88 kg)   Height: 6' (1.829 m)       Height: 6' (1.829 m)  Weight: [unfilled]  BMI:  Body mass index is 26.31 kg/m². General: The patient is alert and oriented x 3, appears to be stated age and in no distress. HEENT: head is normocephalic, atraumatic. EOMI. Neck: supple, trachea midline, no thyromegaly   Cardiovascular: peripheral pulses palpable.   Normal Capillary refill   Respiratory: breathing unlabored, chest expansion symmetric   Skin: no rash, no open wounds, no erythema  Psych: normal affect; mood stable  Neurologic: gait normal, sensation grossly intact in extremities  MSK:     Hip: Right hip exam range of motion elicits pain in buttocks and groin. Internal and external rotation were intact with pain. Flexion 90 degrees tolerable. Abduction and adduction were intact. No tenderness over the greater trochanter. Pain present on Alejo's exam.     IMAGING:    No new imaging obtained today. Previous imaging was reviewed which shows degenerative changes to the right hip. Suspected Paget's disease seen on Pelvis joint spaces grossly maintained      ASSESSMENT  Acute right hip pain after a fall  Known history of Paget's disease, no pain in hip related to Paget's disease prior to his fall    PLAN  Today we discussed his right hip. Patient reports falling injury several weeks ago he has been having increased difficulty weightbearing. He has pain on physical exam with range of motion. Patient states that he received a cortisone injection in his primary care's office several weeks ago which was not beneficial.  This was done over the bursa. We discussed continued conservative treatment options today, as well as surgical intervention today specifically a right total hip arthroplasty. Patient was offered a cortisone injection under fluoroscopy for symptom management. Patient is agreeable to plan of care, he will call the office in the near future to schedule. Francine Woodard [facility]  Orthopaedic Surgery   12/16/20  1:55 PM      Staff Addendum    I have seen and evaluated the patient and agree with the assessment and plan as documented by Carina Doe CNP. I have performed the key components of the history and physical examination and concur with the findings and plan, and have made changes where appropriate/necessary.

## 2020-12-22 RX ORDER — NAPROXEN 500 MG/1
500 TABLET ORAL 2 TIMES DAILY WITH MEALS
Qty: 60 TABLET | Refills: 0 | Status: SHIPPED
Start: 2020-12-22 | End: 2021-01-21 | Stop reason: SDUPTHER

## 2020-12-22 RX ORDER — IPRATROPIUM BROMIDE AND ALBUTEROL SULFATE 2.5; .5 MG/3ML; MG/3ML
1 SOLUTION RESPIRATORY (INHALATION) EVERY 6 HOURS PRN
Qty: 360 ML | Refills: 0 | Status: SHIPPED
Start: 2020-12-22 | End: 2021-01-26 | Stop reason: SDUPTHER

## 2020-12-23 ENCOUNTER — TELEPHONE (OUTPATIENT)
Dept: FAMILY MEDICINE CLINIC | Age: 70
End: 2020-12-23

## 2020-12-23 NOTE — TELEPHONE ENCOUNTER
Dwight Hanna called asking if Dr. Benito Knapp would be able to write an order for a beside commode to be sent to [facility]. She stated Alyssia Schumacher is in a lot of pain and having a hard time getting up/down stairs to go to the bathroom.

## 2020-12-31 ENCOUNTER — TELEPHONE (OUTPATIENT)
Dept: FAMILY MEDICINE CLINIC | Age: 70
End: 2020-12-31

## 2020-12-31 NOTE — TELEPHONE ENCOUNTER
Would like to consider alendronate for hip pain/paget if patient is still having a lot of discomfort. Left message for him to call us back. If still having sx would need to discuss side effects before making decision.

## 2021-01-04 ENCOUNTER — TELEPHONE (OUTPATIENT)
Dept: FAMILY MEDICINE CLINIC | Age: 71
End: 2021-01-04

## 2021-01-04 DIAGNOSIS — M88.9 PAGET DISEASE OF BONE: Primary | ICD-10-CM

## 2021-01-11 RX ORDER — ALENDRONATE SODIUM 40 MG/1
40 TABLET ORAL DAILY
Qty: 60 TABLET | Refills: 2 | Status: SHIPPED
Start: 2021-01-11 | End: 2021-01-13

## 2021-01-11 NOTE — TELEPHONE ENCOUNTER
Richa Garza called a 2nd time stating Solange Simon is still having a lot of pain in his hips and asks if a medication can be ordered to help with this

## 2021-01-11 NOTE — TELEPHONE ENCOUNTER
Plan to start alendronate once daily x 6 months for paget. Discussed side effects with patient and importance of taking med alone and staying upright at least 30 min.

## 2021-01-12 DIAGNOSIS — M88.9 PAGET DISEASE OF BONE: Primary | ICD-10-CM

## 2021-01-13 DIAGNOSIS — M88.9 PAGET DISEASE OF BONE: Primary | ICD-10-CM

## 2021-01-13 RX ORDER — ALENDRONATE SODIUM 70 MG/1
70 TABLET ORAL
Qty: 4 TABLET | Refills: 0 | Status: SHIPPED
Start: 2021-01-13 | End: 2021-02-02 | Stop reason: SDUPTHER

## 2021-01-21 DIAGNOSIS — M25.551 RIGHT HIP PAIN: ICD-10-CM

## 2021-01-21 RX ORDER — NAPROXEN 500 MG/1
500 TABLET ORAL DAILY
Qty: 60 TABLET | Refills: 3 | Status: SHIPPED
Start: 2021-01-21 | End: 2021-11-11 | Stop reason: SDUPTHER

## 2021-01-21 NOTE — TELEPHONE ENCOUNTER
Requested Prescriptions     Pending Prescriptions Disp Refills    naproxen (NAPROSYN) 500 MG tablet 60 tablet 3     Sig: Take 1 tablet by mouth 2 times daily (with meals)

## 2021-01-26 DIAGNOSIS — J44.1 COPD EXACERBATION (HCC): ICD-10-CM

## 2021-01-26 RX ORDER — IPRATROPIUM BROMIDE AND ALBUTEROL SULFATE 2.5; .5 MG/3ML; MG/3ML
1 SOLUTION RESPIRATORY (INHALATION) EVERY 6 HOURS PRN
Qty: 1080 ML | Refills: 1 | Status: SHIPPED
Start: 2021-01-26 | End: 2021-08-10

## 2021-01-26 NOTE — TELEPHONE ENCOUNTER
Requested Prescriptions     Pending Prescriptions Disp Refills    ipratropium-albuterol (DUONEB) 0.5-2.5 (3) MG/3ML SOLN nebulizer solution 1080 mL 1     Sig: Inhale 3 mLs into the lungs every 6 hours as needed for Shortness of Breath

## 2021-02-02 DIAGNOSIS — M88.9 PAGET DISEASE OF BONE: ICD-10-CM

## 2021-02-02 NOTE — TELEPHONE ENCOUNTER
Last Appointment   12/7/2020  Next Appointment      Patient will need a 90 day supply sent to [facility].  [facility] pending drs approval.  - left message for patient to call and schedule his 3 month follow up in march 2021

## 2021-02-03 RX ORDER — ALENDRONATE SODIUM 70 MG/1
70 TABLET ORAL
Qty: 12 TABLET | Refills: 0 | Status: ON HOLD
Start: 2021-02-03 | End: 2021-09-05

## 2021-03-11 NOTE — TELEPHONE ENCOUNTER
Appeal overturned and prior authorization was approved.  Reference #[id number]  Patient notified    Started prior authorization for induction dose again.  Patient states last injection was early January.  Pending quantity override prior authorization.  Reference#[id number]         Pharmacy called stating the Alendronate 40mg tablet is not available (indefinitely) and they ask if Dr. Eliud Chacon would be agreeable to the 70mg instead

## 2021-08-04 DIAGNOSIS — I10 ESSENTIAL (PRIMARY) HYPERTENSION: ICD-10-CM

## 2021-08-04 DIAGNOSIS — R35.0 FREQUENCY OF URINATION: ICD-10-CM

## 2021-08-04 DIAGNOSIS — K21.9 GASTROESOPHAGEAL REFLUX DISEASE WITHOUT ESOPHAGITIS: ICD-10-CM

## 2021-08-04 DIAGNOSIS — F31.60 MIXED BIPOLAR I DISORDER (HCC): ICD-10-CM

## 2021-08-04 DIAGNOSIS — E11.9 TYPE 2 DIABETES MELLITUS WITHOUT COMPLICATION, WITHOUT LONG-TERM CURRENT USE OF INSULIN (HCC): Chronic | ICD-10-CM

## 2021-08-04 DIAGNOSIS — E78.2 MIXED HYPERLIPIDEMIA: ICD-10-CM

## 2021-08-04 RX ORDER — OMEPRAZOLE 20 MG/1
CAPSULE, DELAYED RELEASE ORAL
Qty: 180 CAPSULE | Refills: 0 | Status: SHIPPED
Start: 2021-08-04 | End: 2021-10-18

## 2021-08-04 RX ORDER — TAMSULOSIN HYDROCHLORIDE 0.4 MG/1
CAPSULE ORAL
Qty: 90 CAPSULE | Refills: 0 | Status: SHIPPED
Start: 2021-08-04 | End: 2021-10-18

## 2021-08-04 RX ORDER — ATORVASTATIN CALCIUM 40 MG/1
TABLET, FILM COATED ORAL
Qty: 90 TABLET | Refills: 0 | Status: SHIPPED
Start: 2021-08-04 | End: 2021-10-18

## 2021-08-04 RX ORDER — METFORMIN HYDROCHLORIDE 500 MG/1
TABLET, EXTENDED RELEASE ORAL
Qty: 90 TABLET | Refills: 0 | Status: SHIPPED
Start: 2021-08-04 | End: 2021-10-18

## 2021-08-04 RX ORDER — VENLAFAXINE HYDROCHLORIDE 150 MG/1
CAPSULE, EXTENDED RELEASE ORAL
Qty: 90 CAPSULE | Refills: 0 | Status: SHIPPED
Start: 2021-08-04 | End: 2021-10-18

## 2021-08-04 RX ORDER — AMLODIPINE BESYLATE 10 MG/1
TABLET ORAL
Qty: 90 TABLET | Refills: 0 | Status: SHIPPED
Start: 2021-08-04 | End: 2021-10-18

## 2021-08-04 RX ORDER — ARIPIPRAZOLE 5 MG/1
TABLET ORAL
Qty: 90 TABLET | Refills: 0 | Status: SHIPPED
Start: 2021-08-04 | End: 2021-10-18

## 2021-08-24 ENCOUNTER — TELEPHONE (OUTPATIENT)
Dept: FAMILY MEDICINE CLINIC | Age: 71
End: 2021-08-24

## 2021-08-24 RX ORDER — PREDNISONE 20 MG/1
40 TABLET ORAL DAILY
Qty: 14 TABLET | Refills: 0 | Status: SHIPPED | OUTPATIENT
Start: 2021-08-24 | End: 2021-08-31

## 2021-08-24 NOTE — TELEPHONE ENCOUNTER
Patient with increased sputum and cough. No new sob, fever. Has enough of breathing supplies. Will send for prednisone and plan to touch base next week. Will schedule him to come in soon after.

## 2021-08-24 NOTE — TELEPHONE ENCOUNTER
----- Message from Traekevonshimon Manjarrez sent at 2021  9:54 AM EDT -----  Subject: Appointment Request    Reason for Call: New Patient Request Appointment    QUESTIONS  Type of Appointment? New Patient/New to Provider  Reason for appointment request? Requested Provider unavailable - Kecia Link, DO  Additional Information for Provider? Patient wife called to reschedule   appointment because of transportation issues but provider schedule was not   available.  ---------------------------------------------------------------------------  --------------  CALL BACK INFO  What is the best way for the office to contact you? OK to leave message on   voicemail  Preferred Call Back Phone Number? [phone]  ---------------------------------------------------------------------------  --------------  SCRIPT ANSWERS  Relationship to Patient? Other  Representative Name? Storm Meraz  Additional information verified (besides Name and Date of Birth)? Address  Have your symptoms changed? No  Is this the first appointment to establish care for a ? No  Have you been diagnosed with, awaiting test results for, or told that you   are suspected of having COVID-19 (Coronavirus)? (If patient has tested   negative or was tested as a requirement for work, school, or travel and   not based on symptoms, answer no)? No  Do you currently have flu-like symptoms including fever or chills, cough,   shortness of breath, difficulty breathing, or new loss of taste or smell? No  Have you had close contact with someone with COVID-19 in the last 14 days? No  (Service Expert  click yes below to proceed with [facility] As Usual   Scheduling)?  Yes

## 2021-08-27 DIAGNOSIS — R05.9 COUGH: ICD-10-CM

## 2021-08-27 RX ORDER — ALBUTEROL SULFATE 90 UG/1
2 AEROSOL, METERED RESPIRATORY (INHALATION) EVERY 6 HOURS PRN
Qty: 54 G | Refills: 0 | Status: SHIPPED
Start: 2021-08-27 | End: 2021-11-02

## 2021-09-04 ENCOUNTER — HOSPITAL ENCOUNTER (INPATIENT)
Age: 71
LOS: 3 days | Discharge: HOME | DRG: 871 | End: 2021-09-07
Attending: EMERGENCY MEDICINE | Admitting: FAMILY MEDICINE
Payer: MEDICARE

## 2021-09-04 ENCOUNTER — APPOINTMENT (OUTPATIENT)
Dept: CT IMAGING | Age: 71
DRG: 871 | End: 2021-09-04
Payer: MEDICARE

## 2021-09-04 ENCOUNTER — APPOINTMENT (OUTPATIENT)
Dept: GENERAL RADIOLOGY | Age: 71
DRG: 871 | End: 2021-09-04
Payer: MEDICARE

## 2021-09-04 DIAGNOSIS — J96.01 ACUTE RESPIRATORY FAILURE WITH HYPOXIA (HCC): Primary | ICD-10-CM

## 2021-09-04 DIAGNOSIS — J18.9 PNEUMONIA DUE TO INFECTIOUS ORGANISM, UNSPECIFIED LATERALITY, UNSPECIFIED PART OF LUNG: ICD-10-CM

## 2021-09-04 LAB
ANION GAP SERPL CALCULATED.3IONS-SCNC: 13 MMOL/L (ref 7–16)
B.E.: -3.4 MMOL/L (ref -3–3)
BASOPHILS ABSOLUTE: 0.05 E9/L (ref 0–0.2)
BASOPHILS RELATIVE PERCENT: 0.2 % (ref 0–2)
BUN BLDV-MCNC: 18 MG/DL (ref 6–23)
BURR CELLS: ABNORMAL
C-REACTIVE PROTEIN: 32.1 MG/DL (ref 0–0.4)
CALCIUM SERPL-MCNC: 8.7 MG/DL (ref 8.6–10.2)
CHLORIDE BLD-SCNC: 101 MMOL/L (ref 98–107)
CO2: 22 MMOL/L (ref 22–29)
COHB: 0.6 % (ref 0–1.5)
CREAT SERPL-MCNC: 1 MG/DL (ref 0.7–1.2)
CRITICAL: ABNORMAL
D DIMER: 465 NG/ML DDU
DATE ANALYZED: ABNORMAL
DATE OF COLLECTION: ABNORMAL
EOSINOPHILS ABSOLUTE: 0.06 E9/L (ref 0.05–0.5)
EOSINOPHILS RELATIVE PERCENT: 0.3 % (ref 0–6)
GFR AFRICAN AMERICAN: >60
GFR NON-AFRICAN AMERICAN: >60 ML/MIN/1.73
GLUCOSE BLD-MCNC: 149 MG/DL (ref 74–99)
HBA1C MFR BLD: 6.4 % (ref 4–5.6)
HCO3: 21.8 MMOL/L (ref 22–26)
HCT VFR BLD CALC: 47.1 % (ref 37–54)
HEMOGLOBIN: 15.5 G/DL (ref 12.5–16.5)
HHB: 1.9 % (ref 0–5)
IMMATURE GRANULOCYTES #: 0.2 E9/L
IMMATURE GRANULOCYTES %: 0.8 % (ref 0–5)
LAB: ABNORMAL
LACTIC ACID, SEPSIS: 1.5 MMOL/L (ref 0.5–1.9)
LYMPHOCYTES ABSOLUTE: 0.44 E9/L (ref 1.5–4)
LYMPHOCYTES RELATIVE PERCENT: 1.9 % (ref 20–42)
Lab: ABNORMAL
MCH RBC QN AUTO: 29.2 PG (ref 26–35)
MCHC RBC AUTO-ENTMCNC: 32.9 % (ref 32–34.5)
MCV RBC AUTO: 88.9 FL (ref 80–99.9)
METER GLUCOSE: 131 MG/DL (ref 74–99)
METHB: 0.3 % (ref 0–1.5)
MODE: ABNORMAL
MONOCYTES ABSOLUTE: 1.64 E9/L (ref 0.1–0.95)
MONOCYTES RELATIVE PERCENT: 6.9 % (ref 2–12)
NEUTROPHILS ABSOLUTE: 21.21 E9/L (ref 1.8–7.3)
NEUTROPHILS RELATIVE PERCENT: 89.9 % (ref 43–80)
O2 CONTENT: 20.9 ML/DL
O2 SATURATION: 98.1 % (ref 92–98.5)
O2HB: 97.2 % (ref 94–97)
OPERATOR ID: 2856
OVALOCYTES: ABNORMAL
PATIENT TEMP: 37 C
PCO2: 39.9 MMHG (ref 35–45)
PDW BLD-RTO: 15.5 FL (ref 11.5–15)
PEEP/CPAP: 6 CMH2O
PH BLOOD GAS: 7.36 (ref 7.35–7.45)
PLATELET # BLD: 304 E9/L (ref 130–450)
PMV BLD AUTO: 11 FL (ref 7–12)
PO2: 208.2 MMHG (ref 75–100)
POIKILOCYTES: ABNORMAL
POLYCHROMASIA: ABNORMAL
POTASSIUM REFLEX MAGNESIUM: 4 MMOL/L (ref 3.5–5)
PROCALCITONIN: 0.2 NG/ML (ref 0–0.08)
PS: 14 CMH20
RBC # BLD: 5.3 E12/L (ref 3.8–5.8)
REASON FOR REJECTION: NORMAL
REJECTED TEST: NORMAL
RR MECHANICAL: 12 B/MIN
SARS-COV-2, NAAT: NOT DETECTED
SCHISTOCYTES: ABNORMAL
SODIUM BLD-SCNC: 136 MMOL/L (ref 132–146)
SOURCE, BLOOD GAS: ABNORMAL
THB: 15 G/DL (ref 11.5–16.5)
TIME ANALYZED: 1347
TROPONIN, HIGH SENSITIVITY: 116 NG/L (ref 0–11)
TROPONIN, HIGH SENSITIVITY: 68 NG/L (ref 0–11)
WBC # BLD: 23.6 E9/L (ref 4.5–11.5)

## 2021-09-04 PROCEDURE — 6370000000 HC RX 637 (ALT 250 FOR IP): Performed by: EMERGENCY MEDICINE

## 2021-09-04 PROCEDURE — 6360000002 HC RX W HCPCS: Performed by: INTERNAL MEDICINE

## 2021-09-04 PROCEDURE — 80048 BASIC METABOLIC PNL TOTAL CA: CPT

## 2021-09-04 PROCEDURE — 6360000002 HC RX W HCPCS: Performed by: EMERGENCY MEDICINE

## 2021-09-04 PROCEDURE — 71045 X-RAY EXAM CHEST 1 VIEW: CPT

## 2021-09-04 PROCEDURE — 83036 HEMOGLOBIN GLYCOSYLATED A1C: CPT

## 2021-09-04 PROCEDURE — 94660 CPAP INITIATION&MGMT: CPT

## 2021-09-04 PROCEDURE — 87635 SARS-COV-2 COVID-19 AMP PRB: CPT

## 2021-09-04 PROCEDURE — 2580000003 HC RX 258: Performed by: EMERGENCY MEDICINE

## 2021-09-04 PROCEDURE — 6360000004 HC RX CONTRAST MEDICATION: Performed by: RADIOLOGY

## 2021-09-04 PROCEDURE — 82962 GLUCOSE BLOOD TEST: CPT

## 2021-09-04 PROCEDURE — 99223 1ST HOSP IP/OBS HIGH 75: CPT | Performed by: INTERNAL MEDICINE

## 2021-09-04 PROCEDURE — 71275 CT ANGIOGRAPHY CHEST: CPT

## 2021-09-04 PROCEDURE — 84145 PROCALCITONIN (PCT): CPT

## 2021-09-04 PROCEDURE — 82805 BLOOD GASES W/O2 SATURATION: CPT

## 2021-09-04 PROCEDURE — 96375 TX/PRO/DX INJ NEW DRUG ADDON: CPT

## 2021-09-04 PROCEDURE — 94640 AIRWAY INHALATION TREATMENT: CPT

## 2021-09-04 PROCEDURE — 0202U NFCT DS 22 TRGT SARS-COV-2: CPT

## 2021-09-04 PROCEDURE — 84484 ASSAY OF TROPONIN QUANT: CPT

## 2021-09-04 PROCEDURE — 83605 ASSAY OF LACTIC ACID: CPT

## 2021-09-04 PROCEDURE — 99284 EMERGENCY DEPT VISIT MOD MDM: CPT

## 2021-09-04 PROCEDURE — 2700000000 HC OXYGEN THERAPY PER DAY

## 2021-09-04 PROCEDURE — 2580000003 HC RX 258: Performed by: INTERNAL MEDICINE

## 2021-09-04 PROCEDURE — 85378 FIBRIN DEGRADE SEMIQUANT: CPT

## 2021-09-04 PROCEDURE — 87040 BLOOD CULTURE FOR BACTERIA: CPT

## 2021-09-04 PROCEDURE — 96365 THER/PROPH/DIAG IV INF INIT: CPT

## 2021-09-04 PROCEDURE — 86140 C-REACTIVE PROTEIN: CPT

## 2021-09-04 PROCEDURE — 85025 COMPLETE CBC W/AUTO DIFF WBC: CPT

## 2021-09-04 PROCEDURE — 94664 DEMO&/EVAL PT USE INHALER: CPT

## 2021-09-04 PROCEDURE — 36415 COLL VENOUS BLD VENIPUNCTURE: CPT

## 2021-09-04 PROCEDURE — 2060000000 HC ICU INTERMEDIATE R&B

## 2021-09-04 RX ORDER — ALBUTEROL SULFATE 90 UG/1
2 AEROSOL, METERED RESPIRATORY (INHALATION) EVERY 6 HOURS PRN
Status: DISCONTINUED | OUTPATIENT
Start: 2021-09-04 | End: 2021-09-07 | Stop reason: HOSPADM

## 2021-09-04 RX ORDER — ATORVASTATIN CALCIUM 40 MG/1
40 TABLET, FILM COATED ORAL DAILY
Status: DISCONTINUED | OUTPATIENT
Start: 2021-09-05 | End: 2021-09-07 | Stop reason: HOSPADM

## 2021-09-04 RX ORDER — 0.9 % SODIUM CHLORIDE 0.9 %
1000 INTRAVENOUS SOLUTION INTRAVENOUS ONCE
Status: COMPLETED | OUTPATIENT
Start: 2021-09-04 | End: 2021-09-04

## 2021-09-04 RX ORDER — METHYLPREDNISOLONE SODIUM SUCCINATE 40 MG/ML
40 INJECTION, POWDER, LYOPHILIZED, FOR SOLUTION INTRAMUSCULAR; INTRAVENOUS EVERY 8 HOURS
Status: DISCONTINUED | OUTPATIENT
Start: 2021-09-04 | End: 2021-09-07 | Stop reason: HOSPADM

## 2021-09-04 RX ORDER — IPRATROPIUM BROMIDE AND ALBUTEROL SULFATE 2.5; .5 MG/3ML; MG/3ML
3 SOLUTION RESPIRATORY (INHALATION) ONCE
Status: COMPLETED | OUTPATIENT
Start: 2021-09-04 | End: 2021-09-04

## 2021-09-04 RX ORDER — SODIUM CHLORIDE 9 MG/ML
25 INJECTION, SOLUTION INTRAVENOUS PRN
Status: DISCONTINUED | OUTPATIENT
Start: 2021-09-04 | End: 2021-09-07 | Stop reason: HOSPADM

## 2021-09-04 RX ORDER — ONDANSETRON 2 MG/ML
4 INJECTION INTRAMUSCULAR; INTRAVENOUS EVERY 6 HOURS PRN
Status: DISCONTINUED | OUTPATIENT
Start: 2021-09-04 | End: 2021-09-07 | Stop reason: HOSPADM

## 2021-09-04 RX ORDER — BUDESONIDE 0.25 MG/2ML
250 INHALANT ORAL 2 TIMES DAILY
Status: DISCONTINUED | OUTPATIENT
Start: 2021-09-04 | End: 2021-09-04 | Stop reason: SDUPTHER

## 2021-09-04 RX ORDER — PANTOPRAZOLE SODIUM 40 MG/1
40 TABLET, DELAYED RELEASE ORAL
Status: DISCONTINUED | OUTPATIENT
Start: 2021-09-05 | End: 2021-09-07 | Stop reason: HOSPADM

## 2021-09-04 RX ORDER — AMLODIPINE BESYLATE 10 MG/1
10 TABLET ORAL DAILY
Status: DISCONTINUED | OUTPATIENT
Start: 2021-09-05 | End: 2021-09-07 | Stop reason: HOSPADM

## 2021-09-04 RX ORDER — ONDANSETRON 4 MG/1
4 TABLET, ORALLY DISINTEGRATING ORAL EVERY 8 HOURS PRN
Status: DISCONTINUED | OUTPATIENT
Start: 2021-09-04 | End: 2021-09-07 | Stop reason: HOSPADM

## 2021-09-04 RX ORDER — ACETAMINOPHEN 650 MG/1
650 SUPPOSITORY RECTAL EVERY 6 HOURS PRN
Status: DISCONTINUED | OUTPATIENT
Start: 2021-09-04 | End: 2021-09-07 | Stop reason: HOSPADM

## 2021-09-04 RX ORDER — VENLAFAXINE HYDROCHLORIDE 150 MG/1
150 CAPSULE, EXTENDED RELEASE ORAL
Status: DISCONTINUED | OUTPATIENT
Start: 2021-09-05 | End: 2021-09-07 | Stop reason: HOSPADM

## 2021-09-04 RX ORDER — SUCRALFATE 1 G/1
1 TABLET ORAL
Status: DISCONTINUED | OUTPATIENT
Start: 2021-09-05 | End: 2021-09-07 | Stop reason: HOSPADM

## 2021-09-04 RX ORDER — BUDESONIDE AND FORMOTEROL FUMARATE DIHYDRATE 80; 4.5 UG/1; UG/1
2 AEROSOL RESPIRATORY (INHALATION) 2 TIMES DAILY
Status: DISCONTINUED | OUTPATIENT
Start: 2021-09-04 | End: 2021-09-07 | Stop reason: HOSPADM

## 2021-09-04 RX ORDER — POLYETHYLENE GLYCOL 3350 17 G/17G
17 POWDER, FOR SOLUTION ORAL DAILY PRN
Status: DISCONTINUED | OUTPATIENT
Start: 2021-09-04 | End: 2021-09-07 | Stop reason: HOSPADM

## 2021-09-04 RX ORDER — ACETAMINOPHEN 325 MG/1
650 TABLET ORAL EVERY 6 HOURS PRN
Status: DISCONTINUED | OUTPATIENT
Start: 2021-09-04 | End: 2021-09-07 | Stop reason: HOSPADM

## 2021-09-04 RX ORDER — SODIUM CHLORIDE 0.9 % (FLUSH) 0.9 %
5-40 SYRINGE (ML) INJECTION EVERY 12 HOURS SCHEDULED
Status: DISCONTINUED | OUTPATIENT
Start: 2021-09-04 | End: 2021-09-07 | Stop reason: HOSPADM

## 2021-09-04 RX ORDER — SODIUM CHLORIDE 0.9 % (FLUSH) 0.9 %
5-40 SYRINGE (ML) INJECTION PRN
Status: DISCONTINUED | OUTPATIENT
Start: 2021-09-04 | End: 2021-09-07 | Stop reason: HOSPADM

## 2021-09-04 RX ORDER — IPRATROPIUM BROMIDE AND ALBUTEROL SULFATE 2.5; .5 MG/3ML; MG/3ML
1 SOLUTION RESPIRATORY (INHALATION) EVERY 4 HOURS PRN
Status: DISCONTINUED | OUTPATIENT
Start: 2021-09-04 | End: 2021-09-04 | Stop reason: ALTCHOICE

## 2021-09-04 RX ORDER — LEVOFLOXACIN 5 MG/ML
750 INJECTION, SOLUTION INTRAVENOUS EVERY 24 HOURS
Status: DISCONTINUED | OUTPATIENT
Start: 2021-09-04 | End: 2021-09-07 | Stop reason: HOSPADM

## 2021-09-04 RX ORDER — DOCUSATE SODIUM 100 MG/1
100 CAPSULE, LIQUID FILLED ORAL 2 TIMES DAILY
Status: DISCONTINUED | OUTPATIENT
Start: 2021-09-04 | End: 2021-09-07 | Stop reason: HOSPADM

## 2021-09-04 RX ORDER — TAMSULOSIN HYDROCHLORIDE 0.4 MG/1
0.4 CAPSULE ORAL DAILY
Status: DISCONTINUED | OUTPATIENT
Start: 2021-09-05 | End: 2021-09-07 | Stop reason: HOSPADM

## 2021-09-04 RX ORDER — DEXAMETHASONE SODIUM PHOSPHATE 10 MG/ML
10 INJECTION, SOLUTION INTRAMUSCULAR; INTRAVENOUS ONCE
Status: COMPLETED | OUTPATIENT
Start: 2021-09-04 | End: 2021-09-04

## 2021-09-04 RX ADMIN — IOPAMIDOL 75 ML: 755 INJECTION, SOLUTION INTRAVENOUS at 15:18

## 2021-09-04 RX ADMIN — LEVOFLOXACIN 750 MG: 5 INJECTION, SOLUTION INTRAVENOUS at 22:21

## 2021-09-04 RX ADMIN — SODIUM CHLORIDE, PRESERVATIVE FREE 10 ML: 5 INJECTION INTRAVENOUS at 22:27

## 2021-09-04 RX ADMIN — CEFEPIME HYDROCHLORIDE 2000 MG: 2 INJECTION, POWDER, FOR SOLUTION INTRAVENOUS at 16:30

## 2021-09-04 RX ADMIN — SODIUM CHLORIDE 1000 ML: 9 INJECTION, SOLUTION INTRAVENOUS at 16:30

## 2021-09-04 RX ADMIN — IPRATROPIUM BROMIDE AND ALBUTEROL SULFATE 3 AMPULE: .5; 3 SOLUTION RESPIRATORY (INHALATION) at 12:38

## 2021-09-04 RX ADMIN — DEXAMETHASONE SODIUM PHOSPHATE 10 MG: 10 INJECTION, SOLUTION INTRAMUSCULAR; INTRAVENOUS at 16:30

## 2021-09-04 ASSESSMENT — PAIN SCALES - GENERAL: PAINLEVEL_OUTOF10: 0

## 2021-09-04 NOTE — PROGRESS NOTES
Date: 9/4/2021    Time: 12:40 PM    Patient Placed On BIPAP/CPAP/ Non-Invasive Ventilation? Yes    If no must comment. Facial area red/color change? No           If YES are Blister/Lesion present? No   If yes must notify nursing staff  BIPAP/CPAP skin barrier?   Yes    Skin barrier type:mepilexlite       Comments:    Red outlet plugged in     Saige Mejia RCP
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
## 2021-09-04 NOTE — ED NOTES
FIRST PROVIDER CONTACT ASSESSMENT NOTE      Department of Emergency Medicine   9/4/21  12:05 PM EDT    Chief Complaint: Concern For COVID-19 (84% on RA. not vaccinated. ), Cough, and Shortness of Breath      History of Present Illness: Anson Padilla is a 80 y.o. male who presents to the ED for shortness of breath and cough. History of COPD. Visitor states that she was sick previously and does not know she brought something home. He is not vaccinated for COVID-19. Has not been tested for Covid. He was 84% on room air when he arrived in the ER. He was placed on nasal cannula is now 94%    Medical History:  has a past medical history of Anxiety, Cerebral artery occlusion with cerebral infarction ([address] Depression, GERD (gastroesophageal reflux disease), Headache, Hyperlipidemia, Hypertension, and Substance abuse ([address] Surgical History:  has a past surgical history that includes other surgical history. Social History:  reports that he has never smoked. He has never used smokeless tobacco. He reports current drug use. Frequency: 7.00 times per week. Drug: Marijuana. He reports that he does not drink alcohol. Family History: family history includes Cancer in his mother and paternal grandfather; Diabetes in his brother, paternal grandmother, and sister; No Known Problems in his father. *ALLERGIES*     Penicillins     Physical Exam:      VS:  BP (!) 133/91   Pulse 114   Resp 26   Ht 6' (1.829 m)   Wt 194 lb (88 kg)   SpO2 94%   BMI 26.31 kg/m²      Initial Plan of Care:  Initiate Treatment-Testing, Proceed toTreatment Area When Bed Available for ED Attending/MLP to Continue Care.   Patient will be taken to room 23 for further evaluation    -----------------END OF FIRST PROVIDER CONTACT ASSESSMENT NOTE--------------  Electronically signed by TITI Hazel CNP   DD: 9/4/21             TITI Hazel CNP  09/04/21 1202

## 2021-09-04 NOTE — PROGRESS NOTES
Date: 9/4/2021    Time: 3:26 PM    Patient Placed On BIPAP/CPAP/ Non-Invasive Ventilation? Pt already on    If no must comment. Facial area red/color change? No           If YES are Blister/Lesion present? No   If yes must notify nursing staff  BIPAP/CPAP skin barrier?   Yes    Skin barrier type:mepilexlite       Comments:        Neptali Lassiter RCP

## 2021-09-05 LAB
ADENOVIRUS BY PCR: NOT DETECTED
ANION GAP SERPL CALCULATED.3IONS-SCNC: 9 MMOL/L (ref 7–16)
ANISOCYTOSIS: ABNORMAL
BASOPHILS ABSOLUTE: 0.03 E9/L (ref 0–0.2)
BASOPHILS RELATIVE PERCENT: 0.2 % (ref 0–2)
BORDETELLA PARAPERTUSSIS BY PCR: NOT DETECTED
BORDETELLA PERTUSSIS BY PCR: NOT DETECTED
BUN BLDV-MCNC: 18 MG/DL (ref 6–23)
BURR CELLS: ABNORMAL
CALCIUM SERPL-MCNC: 8.6 MG/DL (ref 8.6–10.2)
CHLAMYDOPHILIA PNEUMONIAE BY PCR: NOT DETECTED
CHLORIDE BLD-SCNC: 104 MMOL/L (ref 98–107)
CO2: 23 MMOL/L (ref 22–29)
CORONAVIRUS 229E BY PCR: NOT DETECTED
CORONAVIRUS HKU1 BY PCR: NOT DETECTED
CORONAVIRUS NL63 BY PCR: NOT DETECTED
CORONAVIRUS OC43 BY PCR: NOT DETECTED
CREAT SERPL-MCNC: 0.7 MG/DL (ref 0.7–1.2)
EOSINOPHILS ABSOLUTE: 0.03 E9/L (ref 0.05–0.5)
EOSINOPHILS RELATIVE PERCENT: 0.2 % (ref 0–6)
GFR AFRICAN AMERICAN: >60
GFR NON-AFRICAN AMERICAN: >60 ML/MIN/1.73
GLUCOSE BLD-MCNC: 137 MG/DL (ref 74–99)
HCT VFR BLD CALC: 38.8 % (ref 37–54)
HEMOGLOBIN: 12.8 G/DL (ref 12.5–16.5)
HUMAN METAPNEUMOVIRUS BY PCR: NOT DETECTED
HUMAN RHINOVIRUS/ENTEROVIRUS BY PCR: NOT DETECTED
IMMATURE GRANULOCYTES #: 0.12 E9/L
IMMATURE GRANULOCYTES %: 0.6 % (ref 0–5)
INFLUENZA A BY PCR: NOT DETECTED
INFLUENZA B BY PCR: NOT DETECTED
L. PNEUMOPHILA SEROGP 1 UR AG: NORMAL
LACTIC ACID: 0.9 MMOL/L (ref 0.5–2.2)
LYMPHOCYTES ABSOLUTE: 0.54 E9/L (ref 1.5–4)
LYMPHOCYTES RELATIVE PERCENT: 2.8 % (ref 20–42)
MCH RBC QN AUTO: 29.8 PG (ref 26–35)
MCHC RBC AUTO-ENTMCNC: 33 % (ref 32–34.5)
MCV RBC AUTO: 90.2 FL (ref 80–99.9)
METER GLUCOSE: 128 MG/DL (ref 74–99)
METER GLUCOSE: 132 MG/DL (ref 74–99)
METER GLUCOSE: 150 MG/DL (ref 74–99)
METER GLUCOSE: 160 MG/DL (ref 74–99)
MONOCYTES ABSOLUTE: 0.69 E9/L (ref 0.1–0.95)
MONOCYTES RELATIVE PERCENT: 3.6 % (ref 2–12)
MYCOPLASMA PNEUMONIAE BY PCR: NOT DETECTED
NEUTROPHILS ABSOLUTE: 17.59 E9/L (ref 1.8–7.3)
NEUTROPHILS RELATIVE PERCENT: 92.6 % (ref 43–80)
OVALOCYTES: ABNORMAL
PARAINFLUENZA VIRUS 1 BY PCR: NOT DETECTED
PARAINFLUENZA VIRUS 2 BY PCR: NOT DETECTED
PARAINFLUENZA VIRUS 3 BY PCR: NOT DETECTED
PARAINFLUENZA VIRUS 4 BY PCR: NOT DETECTED
PDW BLD-RTO: 15.5 FL (ref 11.5–15)
PLATELET # BLD: 256 E9/L (ref 130–450)
PMV BLD AUTO: 10.4 FL (ref 7–12)
POIKILOCYTES: ABNORMAL
POTASSIUM REFLEX MAGNESIUM: 4 MMOL/L (ref 3.5–5)
PROCALCITONIN: 0.2 NG/ML (ref 0–0.08)
RBC # BLD: 4.3 E12/L (ref 3.8–5.8)
RESPIRATORY SYNCYTIAL VIRUS BY PCR: DETECTED
SARS-COV-2, PCR: NOT DETECTED
SODIUM BLD-SCNC: 136 MMOL/L (ref 132–146)
WBC # BLD: 19 E9/L (ref 4.5–11.5)

## 2021-09-05 PROCEDURE — 36415 COLL VENOUS BLD VENIPUNCTURE: CPT

## 2021-09-05 PROCEDURE — 84145 PROCALCITONIN (PCT): CPT

## 2021-09-05 PROCEDURE — 87449 NOS EACH ORGANISM AG IA: CPT

## 2021-09-05 PROCEDURE — 94660 CPAP INITIATION&MGMT: CPT

## 2021-09-05 PROCEDURE — 85025 COMPLETE CBC W/AUTO DIFF WBC: CPT

## 2021-09-05 PROCEDURE — 87206 SMEAR FLUORESCENT/ACID STAI: CPT

## 2021-09-05 PROCEDURE — 87070 CULTURE OTHR SPECIMN AEROBIC: CPT

## 2021-09-05 PROCEDURE — 99233 SBSQ HOSP IP/OBS HIGH 50: CPT | Performed by: INTERNAL MEDICINE

## 2021-09-05 PROCEDURE — 2060000000 HC ICU INTERMEDIATE R&B

## 2021-09-05 PROCEDURE — 6360000002 HC RX W HCPCS: Performed by: INTERNAL MEDICINE

## 2021-09-05 PROCEDURE — 2500000003 HC RX 250 WO HCPCS: Performed by: EMERGENCY MEDICINE

## 2021-09-05 PROCEDURE — 83605 ASSAY OF LACTIC ACID: CPT

## 2021-09-05 PROCEDURE — 6370000000 HC RX 637 (ALT 250 FOR IP): Performed by: INTERNAL MEDICINE

## 2021-09-05 PROCEDURE — 2580000003 HC RX 258: Performed by: EMERGENCY MEDICINE

## 2021-09-05 PROCEDURE — 80048 BASIC METABOLIC PNL TOTAL CA: CPT

## 2021-09-05 PROCEDURE — 93005 ELECTROCARDIOGRAM TRACING: CPT | Performed by: INTERNAL MEDICINE

## 2021-09-05 PROCEDURE — 2700000000 HC OXYGEN THERAPY PER DAY

## 2021-09-05 PROCEDURE — 2580000003 HC RX 258: Performed by: INTERNAL MEDICINE

## 2021-09-05 PROCEDURE — 82962 GLUCOSE BLOOD TEST: CPT

## 2021-09-05 RX ADMIN — SUCRALFATE 1 G: 1 TABLET ORAL at 05:30

## 2021-09-05 RX ADMIN — VENLAFAXINE HYDROCHLORIDE 150 MG: 150 CAPSULE, EXTENDED RELEASE ORAL at 10:30

## 2021-09-05 RX ADMIN — LEVOFLOXACIN 750 MG: 5 INJECTION, SOLUTION INTRAVENOUS at 21:02

## 2021-09-05 RX ADMIN — SODIUM CHLORIDE, PRESERVATIVE FREE 10 ML: 5 INJECTION INTRAVENOUS at 21:02

## 2021-09-05 RX ADMIN — ENOXAPARIN SODIUM 40 MG: 40 INJECTION SUBCUTANEOUS at 10:29

## 2021-09-05 RX ADMIN — DOCUSATE SODIUM 100 MG: 100 CAPSULE ORAL at 19:50

## 2021-09-05 RX ADMIN — SODIUM CHLORIDE, PRESERVATIVE FREE 10 ML: 5 INJECTION INTRAVENOUS at 10:30

## 2021-09-05 RX ADMIN — BUDESONIDE AND FORMOTEROL FUMARATE DIHYDRATE 2 PUFF: 80; 4.5 AEROSOL RESPIRATORY (INHALATION) at 10:36

## 2021-09-05 RX ADMIN — SUCRALFATE 1 G: 1 TABLET ORAL at 17:43

## 2021-09-05 RX ADMIN — ATORVASTATIN CALCIUM 40 MG: 40 TABLET, FILM COATED ORAL at 10:30

## 2021-09-05 RX ADMIN — AMLODIPINE BESYLATE 10 MG: 10 TABLET ORAL at 10:30

## 2021-09-05 RX ADMIN — DOXYCYCLINE 100 MG: 100 INJECTION, POWDER, LYOPHILIZED, FOR SOLUTION INTRAVENOUS at 15:08

## 2021-09-05 RX ADMIN — METHYLPREDNISOLONE SODIUM SUCCINATE 40 MG: 40 INJECTION, POWDER, LYOPHILIZED, FOR SOLUTION INTRAMUSCULAR; INTRAVENOUS at 10:30

## 2021-09-05 RX ADMIN — INSULIN LISPRO 1 UNITS: 100 INJECTION, SOLUTION INTRAVENOUS; SUBCUTANEOUS at 19:49

## 2021-09-05 RX ADMIN — METHYLPREDNISOLONE SODIUM SUCCINATE 40 MG: 40 INJECTION, POWDER, LYOPHILIZED, FOR SOLUTION INTRAMUSCULAR; INTRAVENOUS at 17:43

## 2021-09-05 RX ADMIN — ALBUTEROL SULFATE 2 PUFF: 90 AEROSOL, METERED RESPIRATORY (INHALATION) at 05:32

## 2021-09-05 RX ADMIN — ALBUTEROL SULFATE 2 PUFF: 90 AEROSOL, METERED RESPIRATORY (INHALATION) at 22:37

## 2021-09-05 RX ADMIN — BUDESONIDE AND FORMOTEROL FUMARATE DIHYDRATE 2 PUFF: 80; 4.5 AEROSOL RESPIRATORY (INHALATION) at 19:51

## 2021-09-05 RX ADMIN — TAMSULOSIN HYDROCHLORIDE 0.4 MG: 0.4 CAPSULE ORAL at 10:30

## 2021-09-05 RX ADMIN — DOXYCYCLINE 100 MG: 100 INJECTION, POWDER, LYOPHILIZED, FOR SOLUTION INTRAVENOUS at 03:02

## 2021-09-05 RX ADMIN — DOCUSATE SODIUM 100 MG: 100 CAPSULE ORAL at 10:30

## 2021-09-05 RX ADMIN — IPRATROPIUM BROMIDE 2 PUFF: 17 AEROSOL, METERED RESPIRATORY (INHALATION) at 05:32

## 2021-09-05 RX ADMIN — PANTOPRAZOLE SODIUM 40 MG: 40 TABLET, DELAYED RELEASE ORAL at 05:31

## 2021-09-05 RX ADMIN — METHYLPREDNISOLONE SODIUM SUCCINATE 40 MG: 40 INJECTION, POWDER, LYOPHILIZED, FOR SOLUTION INTRAMUSCULAR; INTRAVENOUS at 02:23

## 2021-09-05 ASSESSMENT — ENCOUNTER SYMPTOMS
SHORTNESS OF BREATH: 1
ABDOMINAL PAIN: 0
VOMITING: 0
DIARRHEA: 0
SINUS PRESSURE: 0
NAUSEA: 0
SORE THROAT: 0
EYE DISCHARGE: 0
COUGH: 1
EYE REDNESS: 0
WHEEZING: 0
SWOLLEN GLANDS: 0
EYE PAIN: 0
BACK PAIN: 0

## 2021-09-05 ASSESSMENT — PAIN SCALES - GENERAL
PAINLEVEL_OUTOF10: 0
PAINLEVEL_OUTOF10: 0

## 2021-09-05 NOTE — PROGRESS NOTES
Patient unable to review home medications and requesting RN to contact spouse. Attempted to contact spouse Elvin Liang [phone]; number is currently not working. Patient notified and states that he doesn't know any other phone number at this time.

## 2021-09-05 NOTE — PROGRESS NOTES
Pt is taken to floor with RT and RN on telemetry. Pt is updated on POC, verbalzied understanding. Pt placed into room, BiPAP settings confirmed, pt tolerating well, VSS at time of transfer. No needs identified. *Delay in pt transfer to floor related to no RT assistance available as other critical patients are in department.

## 2021-09-05 NOTE — ED PROVIDER NOTES
69-year-old male presents to the emergency department significant shortness of breath. Patient is not vaccinated for COVID-19 on arrival he was 84% per nursing staff. He was placed on BiPAP by respiratory prior to my intervention of the case. He states symptoms been going on with cough and shortness of breath for approximately 3 days and came in when his symptoms got so worse. He denies chest pain denies nausea vomiting dye abdominal pain urinary symptoms or leg swelling. The history is provided by the patient. Shortness of Breath  Severity:  Moderate  Onset quality:  Gradual  Duration:  3 days  Timing:  Intermittent  Progression:  Waxing and waning  Chronicity:  New  Relieved by:  Nothing  Worsened by:  Nothing  Ineffective treatments:  None tried  Associated symptoms: cough    Associated symptoms: no abdominal pain, no chest pain, no ear pain, no fever, no headaches, no PND, no rash, no sore throat, no syncope, no swollen glands, no vomiting and no wheezing         Review of Systems   Constitutional: Negative for chills and fever. HENT: Negative for ear pain, sinus pressure and sore throat. Eyes: Negative for pain, discharge and redness. Respiratory: Positive for cough and shortness of breath. Negative for wheezing. Cardiovascular: Negative for chest pain, syncope and PND. Gastrointestinal: Negative for abdominal pain, diarrhea, nausea and vomiting. Genitourinary: Negative for dysuria and frequency. Musculoskeletal: Negative for arthralgias and back pain. Skin: Negative for rash and wound. Neurological: Negative for weakness and headaches. Hematological: Negative for adenopathy. All other systems reviewed and are negative. Physical Exam  Constitutional:       General: He is in acute distress. Appearance: He is ill-appearing. HENT:      Head: Normocephalic and atraumatic. Cardiovascular:      Rate and Rhythm: Regular rhythm. Tachycardia present.    Pulmonary: Effort: Tachypnea and respiratory distress present. Breath sounds: Wheezing present. Abdominal:      General: Bowel sounds are normal.      Palpations: Abdomen is soft. Musculoskeletal:         General: Normal range of motion. Cervical back: Normal range of motion. Right lower leg: No tenderness. No edema. Left lower leg: No tenderness. No edema. Skin:     General: Skin is warm. Capillary Refill: Capillary refill takes less than 2 seconds. Neurological:      General: No focal deficit present. Mental Status: He is alert and oriented to person, place, and time. Procedures     MDM  Number of Diagnoses or Management Options  Acute respiratory failure with hypoxia ([facility]  Pneumonia due to infectious organism, unspecified laterality, unspecified part of lung  Diagnosis management comments: Patient seen and examined. Labs and imaging were ordered. Patient was placed on BiPAP he had improvement on this breathing treatments were given. Labs and imaging revealed likely bacterial pneumonia antibiotics were initiated for this. Patient's Covid test was negative. Molecular film panel was added on. After work-up and CTA of chest revealed bacterial pneumonia patient was admitted to the hospital service for further interventions.             --------------------------------------------- PAST HISTORY ---------------------------------------------  Past Medical History:  has a past medical history of Anxiety, Cerebral artery occlusion with cerebral infarction ([facility] Depression, GERD (gastroesophageal reflux disease), Headache, Hyperlipidemia, Hypertension, and Substance abuse ([facility] Past Surgical History:  has a past surgical history that includes other surgical history. Social History:  reports that he has never smoked. He has never used smokeless tobacco. He reports current drug use. Frequency: 7.00 times per week. Drug: Marijuana.  He reports that he does not drink alcohol. Family History: family history includes Cancer in his mother and paternal grandfather; Diabetes in his brother, paternal grandmother, and sister; No Known Problems in his father. The patients home medications have been reviewed.     Allergies: Penicillins    -------------------------------------------------- RESULTS -------------------------------------------------    Lab  Results for orders placed or performed during the hospital encounter of 09/04/21   COVID-19, Rapid    Specimen: Nasopharyngeal Swab   Result Value Ref Range    SARS-CoV-2, NAAT Not Detected Not Detected   Respiratory Panel, Molecular, with COVID-19 (Restricted: peds pts or suitable admitted adults)    Specimen: Nasopharyngeal   Result Value Ref Range    Adenovirus by PCR Not Detected Not Detected    Bordetella parapertussis by PCR Not Detected Not Detected    Bordetella pertussis by PCR Not Detected Not Detected    Chlamydophilia pneumoniae by PCR Not Detected Not Detected    Coronavirus 229E by PCR Not Detected Not Detected    Coronavirus HKU1 by PCR Not Detected Not Detected    Coronavirus NL63 by PCR Not Detected Not Detected    Coronavirus OC43 by PCR Not Detected Not Detected    SARS-CoV-2, PCR Not Detected Not Detected    Human Metapneumovirus by PCR Not Detected Not Detected    Human Rhinovirus/Enterovirus by PCR Not Detected Not Detected    Influenza A by PCR Not Detected Not Detected    Influenza B by PCR Not Detected Not Detected    Mycoplasma pneumoniae by PCR Not Detected Not Detected    Parainfluenza Virus 1 by PCR Not Detected Not Detected    Parainfluenza Virus 2 by PCR Not Detected Not Detected    Parainfluenza Virus 3 by PCR Not Detected Not Detected    Parainfluenza Virus 4 by PCR Not Detected Not Detected    Respiratory Syncytial Virus by PCR DETECTED (A) Not Detected   CBC Auto Differential   Result Value Ref Range    WBC 23.6 (H) 4.5 - 11.5 E9/L    RBC 5.30 3.80 - 5.80 E12/L    Hemoglobin 15.5 12.5 - 16.5 g/dL Hematocrit 47.1 37.0 - 54.0 %    MCV 88.9 80.0 - 99.9 fL    MCH 29.2 26.0 - 35.0 pg    MCHC 32.9 32.0 - 34.5 %    RDW 15.5 (H) 11.5 - 15.0 fL    Platelets 443 439 - 076 E9/L    MPV 11.0 7.0 - 12.0 fL    Neutrophils % 89.9 (H) 43.0 - 80.0 %    Immature Granulocytes % 0.8 0.0 - 5.0 %    Lymphocytes % 1.9 (L) 20.0 - 42.0 %    Monocytes % 6.9 2.0 - 12.0 %    Eosinophils % 0.3 0.0 - 6.0 %    Basophils % 0.2 0.0 - 2.0 %    Neutrophils Absolute 21.21 (H) 1.80 - 7.30 E9/L    Immature Granulocytes # 0.20 E9/L    Lymphocytes Absolute 0.44 (L) 1.50 - 4.00 E9/L    Monocytes Absolute 1.64 (H) 0.10 - 0.95 E9/L    Eosinophils Absolute 0.06 0.05 - 0.50 E9/L    Basophils Absolute 0.05 0.00 - 0.20 E9/L    Polychromasia 1+     Poikilocytes 2+     Schistocytes 1+     Adi Cells 2+     Ovalocytes 1+    Basic Metabolic Panel w/ Reflex to MG   Result Value Ref Range    Sodium 136 132 - 146 mmol/L    Potassium reflex Magnesium 4.0 3.5 - 5.0 mmol/L    Chloride 101 98 - 107 mmol/L    CO2 22 22 - 29 mmol/L    Anion Gap 13 7 - 16 mmol/L    Glucose 149 (H) 74 - 99 mg/dL    BUN 18 6 - 23 mg/dL    CREATININE 1.0 0.7 - 1.2 mg/dL    GFR Non-African American >60 >=60 mL/min/1.73    GFR African American >60     Calcium 8.7 8.6 - 10.2 mg/dL   Troponin   Result Value Ref Range    Troponin, High Sensitivity 68 (H) 0 - 11 ng/L   D-Dimer, Quantitative   Result Value Ref Range    D-Dimer, Quant 465 ng/mL DDU   C-Reactive Protein   Result Value Ref Range    CRP 32.1 (H) 0.0 - 0.4 mg/dL   Procalcitonin   Result Value Ref Range    Procalcitonin 0.20 (H) 0.00 - 0.08 ng/mL   Lactate, Sepsis   Result Value Ref Range    Lactic Acid, Sepsis 1.5 0.5 - 1.9 mmol/L   Blood Gas, Arterial   Result Value Ref Range    Date Analyzed [id number]     Time Analyzed 1347     Source: Blood Arterial     pH, Blood Gas 7.355 7.350 - 7.450    PCO2 39.9 35.0 - 45.0 mmHg    PO2 208.2 (H) 75.0 - 100.0 mmHg    HCO3 21.8 (L) 22.0 - 26.0 mmol/L    B.E. -3.4 (L) -3.0 - 3.0 mmol/L    O2 Sat 98.1 92.0 - 98.5 %    O2Hb 97.2 (H) 94.0 - 97.0 %    COHb 0.6 0.0 - 1.5 %    MetHb 0.3 0.0 - 1.5 %    O2 Content 20.9 mL/dL    HHb 1.9 0.0 - 5.0 %    tHb (est) 15.0 11.5 - 16.5 g/dL    Mode BILEVEL 60%     Rr Mechanical 12.0 b/min    Peep/Cpap 6.0 cmH2O    PS 14 cmH20    Date Of Collection      Time Collected      Pt Temp 37.0 C     ID [id number]     Lab [id number]     Critical(s) Notified .  No Critical Values    SPECIMEN REJECTION   Result Value Ref Range    Rejected Test ESR     Reason for Rejection see below    Troponin   Result Value Ref Range    Troponin, High Sensitivity 116 (H) 0 - 11 ng/L   HEMOGLOBIN A1C   Result Value Ref Range    Hemoglobin A1C 6.4 (H) 4.0 - 5.6 %   Basic Metabolic Panel w/ Reflex to MG   Result Value Ref Range    Sodium 136 132 - 146 mmol/L    Potassium reflex Magnesium 4.0 3.5 - 5.0 mmol/L    Chloride 104 98 - 107 mmol/L    CO2 23 22 - 29 mmol/L    Anion Gap 9 7 - 16 mmol/L    Glucose 137 (H) 74 - 99 mg/dL    BUN 18 6 - 23 mg/dL    CREATININE 0.7 0.7 - 1.2 mg/dL    GFR Non-African American >60 >=60 mL/min/1.73    GFR African American >60     Calcium 8.6 8.6 - 10.2 mg/dL   CBC auto differential   Result Value Ref Range    WBC 19.0 (H) 4.5 - 11.5 E9/L    RBC 4.30 3.80 - 5.80 E12/L    Hemoglobin 12.8 12.5 - 16.5 g/dL    Hematocrit 38.8 37.0 - 54.0 %    MCV 90.2 80.0 - 99.9 fL    MCH 29.8 26.0 - 35.0 pg    MCHC 33.0 32.0 - 34.5 %    RDW 15.5 (H) 11.5 - 15.0 fL    Platelets 684 826 - 977 E9/L    MPV 10.4 7.0 - 12.0 fL    Neutrophils % 92.6 (H) 43.0 - 80.0 %    Immature Granulocytes % 0.6 0.0 - 5.0 %    Lymphocytes % 2.8 (L) 20.0 - 42.0 %    Monocytes % 3.6 2.0 - 12.0 %    Eosinophils % 0.2 0.0 - 6.0 %    Basophils % 0.2 0.0 - 2.0 %    Neutrophils Absolute 17.59 (H) 1.80 - 7.30 E9/L    Immature Granulocytes # 0.12 E9/L    Lymphocytes Absolute 0.54 (L) 1.50 - 4.00 E9/L    Monocytes Absolute 0.69 0.10 - 0.95 E9/L    Eosinophils Absolute 0.03 (L) 0.05 - 0.50 E9/L    Basophils Absolute 0.03 0.00 caliber. Mediastinum: No evidence of mediastinal lymphadenopathy. The heart and pericardium demonstrate no acute abnormality. There is no acute abnormality of the thoracic aorta. Lungs/pleura: Multifocal small vague patchy pulmonary infiltrates are noted. The findings do not demonstrate the typical appearance for COVID pneumonia. There is no pleural effusion or pleural thickening. Upper Abdomen: There is a large hiatal hernia. Soft Tissues/Bones: No acute bone or soft tissue abnormality. 1. There is no evidence of a pulmonary embolus 2. Large hiatal hernia. 3. Very small multifocal bilateral pulmonary infiltrates more prominent within the right lung. The findings represent pneumonia. The appearance of the infiltrates are not typical for COVID pneumonia. The findings can represent atypical pneumonia or possibly early COVID pneumonia.       ------------------------- NURSING NOTES AND VITALS REVIEWED ---------------------------  Date / Time Roomed:  9/4/2021 12:27 PM  ED Bed Assignment:  [id number]    The nursing notes within the ED encounter and vital signs as below have been reviewed.    Patient Vitals for the past 24 hrs:   BP Temp Temp src Pulse Resp SpO2 Height Weight   09/05/21 0745 (!) 146/91 98.1 °F (36.7 °C) Oral 95 -- 97 % -- --   09/05/21 0504 -- -- -- -- 24 -- -- --   09/05/21 0151 -- -- -- -- 23 -- -- --   09/04/21 2115 (!) 144/89 97.2 °F (36.2 °C) Axillary 100 30 98 % 6' (1.829 m) 192 lb 11.2 oz (87.4 kg)   09/04/21 2052 -- -- -- -- 28 98 % -- --   09/04/21 2037 -- -- -- -- (!) 32 -- -- --   09/04/21 1800 131/86 -- -- 98 -- 99 % -- --   09/04/21 1700 123/87 -- -- 96 26 100 % -- --   09/04/21 1600 118/80 -- -- 104 24 98 % -- --   09/04/21 1525 -- -- -- -- (!) 31 -- -- --   09/04/21 1500 (!) 125/103 -- -- 108 -- 99 % -- --   09/04/21 1238 -- -- -- -- 27 -- -- --   09/04/21 1233 -- 98.1 °F (36.7 °C) Oral -- -- -- -- --   09/04/21 1205 (!) 133/91 -- -- 114 26 94 % 6' (1.829 m) 194 lb (88 kg) Oxygen Saturation Interpretation: Normal      ------------------------------------------ PROGRESS NOTES ------------------------------------------  I have spoken with the patient and discussed todays results, in addition to providing specific details for the plan of care and counseling regarding the diagnosis and prognosis. Their questions are answered at this time and they are agreeable with the plan.      --------------------------------- ADDITIONAL PROVIDER NOTES ---------------------------------  This patient's ED course included: a personal history and physicial examination, re-evaluation prior to disposition, multiple bedside re-evaluations, IV medications, cardiac monitoring, continuous pulse oximetry and complex medical decision making and emergency management    This patient has remained hemodynamically stable during their ED course. Please note that the withdrawal or failure to initiate urgent interventions for this patient would likely result in a life threatening deterioration or permanent disability. Accordingly this patient received 30 minutes of critical care time, excluding separately billable procedures. Clinical Impression  1. Acute respiratory failure with hypoxia ([address]    2. Pneumonia due to infectious organism, unspecified laterality, unspecified part of lung          Disposition  Patient's disposition: Admit to telemetry  Patient's condition is fair.           Erika Cadena DO  09/05/21 6716

## 2021-09-05 NOTE — PROGRESS NOTES
[facility] Progress Note    Admitting Date and Time: 9/4/2021 12:27 PM  Admit Dx: Acute respiratory failure with hypoxia ([facility] [J96.01]    Subjective:  Patient is being followed for Acute respiratory failure with hypoxia ([facility] [address] [J96.01]   Pt feels better today, still coughing, improved shortness of breath      ROS: denies fever, chills, cp, n/v, HA unless stated above.       doxycycline (VIBRAMYCIN) IV  100 mg IntraVENous Q12H    amLODIPine  10 mg Oral Daily    atorvastatin  40 mg Oral Daily    budesonide-formoterol  2 puff Inhalation BID    docusate sodium  100 mg Oral BID    pantoprazole  40 mg Oral QAM AC    sucralfate  1 g Oral BID AC    tamsulosin  0.4 mg Oral Daily    venlafaxine  150 mg Oral Daily with breakfast    sodium chloride flush  5-40 mL IntraVENous 2 times per day    enoxaparin  40 mg SubCUTAneous Daily    levofloxacin  750 mg IntraVENous Q24H    insulin lispro  0-6 Units SubCUTAneous TID WC    insulin lispro  0-3 Units SubCUTAneous Nightly    methylPREDNISolone  40 mg IntraVENous Q8H     albuterol sulfate HFA, 2 puff, Q6H PRN  sodium chloride flush, 5-40 mL, PRN  sodium chloride, 25 mL, PRN  ondansetron, 4 mg, Q8H PRN   Or  ondansetron, 4 mg, Q6H PRN  polyethylene glycol, 17 g, Daily PRN  acetaminophen, 650 mg, Q6H PRN   Or  acetaminophen, 650 mg, Q6H PRN  ipratropium, 2 puff, Q4H PRN         Objective:    BP (!) 146/91   Pulse 95   Temp 98.1 °F (36.7 °C) (Oral)   Resp 24   Ht 6' (1.829 m)   Wt 192 lb 11.2 oz (87.4 kg)   SpO2 97%   BMI 26.13 kg/m²     General Appearance: alert and oriented to person, place and time and in moderate acute distress  Skin: warm and dry  Head: normocephalic and atraumatic  Eyes: pupils equal, round, and reactive to light, extraocular eye movements intact, conjunctivae normal  Neck: neck supple and non tender without mass   Pulmonary/Chest: Bilateral wheezing - no rales or rhonchi, normal air movement, no respiratory exacerbation of COPD, start the patient on bronchodilators and steroids      NOTE: This report was transcribed using voice recognition software. Every effort was made to ensure accuracy; however, inadvertent computerized transcription errors may be present.   Electronically signed by Colton Claire MD on 9/5/2021 at 8:49 AM

## 2021-09-05 NOTE — PROGRESS NOTES
09/04/21 2052   Oxygen Therapy/Pulse Ox   O2 Therapy Oxygen   $Oxygen $Daily Charge   O2 Device PAP (positive airway pressure)   FiO2  35 %   Resp 28   SpO2 98 %

## 2021-09-05 NOTE — PROGRESS NOTES
09/04/21 2052   NIV Type   Mode Bilevel   Mask Type Full face mask   Mask Size Large   Settings/Measurements   IPAP 14 cmH20   CPAP/EPAP 6 cmH2O   Rate Ordered 12   Resp 28   FiO2  35 %   Vt Exhaled 683 mL   Minute Volume 22.3 Liters   Mask Leak (lpm) 45 lpm   Comfort Level Good   Using Accessory Muscles No   SpO2 98

## 2021-09-06 LAB
METER GLUCOSE: 125 MG/DL (ref 74–99)
METER GLUCOSE: 131 MG/DL (ref 74–99)
METER GLUCOSE: 166 MG/DL (ref 74–99)
METER GLUCOSE: 191 MG/DL (ref 74–99)
STREP PNEUMONIAE ANTIGEN, URINE: NORMAL
TROPONIN, HIGH SENSITIVITY: 27 NG/L (ref 0–11)

## 2021-09-06 PROCEDURE — 36415 COLL VENOUS BLD VENIPUNCTURE: CPT

## 2021-09-06 PROCEDURE — 6370000000 HC RX 637 (ALT 250 FOR IP): Performed by: INTERNAL MEDICINE

## 2021-09-06 PROCEDURE — 94660 CPAP INITIATION&MGMT: CPT

## 2021-09-06 PROCEDURE — 2700000000 HC OXYGEN THERAPY PER DAY

## 2021-09-06 PROCEDURE — 2580000003 HC RX 258: Performed by: EMERGENCY MEDICINE

## 2021-09-06 PROCEDURE — 2500000003 HC RX 250 WO HCPCS: Performed by: EMERGENCY MEDICINE

## 2021-09-06 PROCEDURE — 84484 ASSAY OF TROPONIN QUANT: CPT

## 2021-09-06 PROCEDURE — 6360000002 HC RX W HCPCS: Performed by: INTERNAL MEDICINE

## 2021-09-06 PROCEDURE — 82962 GLUCOSE BLOOD TEST: CPT

## 2021-09-06 PROCEDURE — 2060000000 HC ICU INTERMEDIATE R&B

## 2021-09-06 PROCEDURE — 2580000003 HC RX 258: Performed by: INTERNAL MEDICINE

## 2021-09-06 PROCEDURE — 99233 SBSQ HOSP IP/OBS HIGH 50: CPT | Performed by: INTERNAL MEDICINE

## 2021-09-06 RX ADMIN — INSULIN LISPRO 1 UNITS: 100 INJECTION, SOLUTION INTRAVENOUS; SUBCUTANEOUS at 06:04

## 2021-09-06 RX ADMIN — ENOXAPARIN SODIUM 40 MG: 40 INJECTION SUBCUTANEOUS at 08:22

## 2021-09-06 RX ADMIN — TAMSULOSIN HYDROCHLORIDE 0.4 MG: 0.4 CAPSULE ORAL at 08:21

## 2021-09-06 RX ADMIN — SODIUM CHLORIDE, PRESERVATIVE FREE 10 ML: 5 INJECTION INTRAVENOUS at 08:27

## 2021-09-06 RX ADMIN — METHYLPREDNISOLONE SODIUM SUCCINATE 40 MG: 40 INJECTION, POWDER, LYOPHILIZED, FOR SOLUTION INTRAMUSCULAR; INTRAVENOUS at 08:22

## 2021-09-06 RX ADMIN — METHYLPREDNISOLONE SODIUM SUCCINATE 40 MG: 40 INJECTION, POWDER, LYOPHILIZED, FOR SOLUTION INTRAMUSCULAR; INTRAVENOUS at 00:50

## 2021-09-06 RX ADMIN — DOXYCYCLINE 100 MG: 100 INJECTION, POWDER, LYOPHILIZED, FOR SOLUTION INTRAVENOUS at 16:07

## 2021-09-06 RX ADMIN — SUCRALFATE 1 G: 1 TABLET ORAL at 05:32

## 2021-09-06 RX ADMIN — LEVOFLOXACIN 750 MG: 5 INJECTION, SOLUTION INTRAVENOUS at 20:31

## 2021-09-06 RX ADMIN — PANTOPRAZOLE SODIUM 40 MG: 40 TABLET, DELAYED RELEASE ORAL at 05:32

## 2021-09-06 RX ADMIN — BUDESONIDE AND FORMOTEROL FUMARATE DIHYDRATE 2 PUFF: 80; 4.5 AEROSOL RESPIRATORY (INHALATION) at 08:21

## 2021-09-06 RX ADMIN — DOXYCYCLINE 100 MG: 100 INJECTION, POWDER, LYOPHILIZED, FOR SOLUTION INTRAVENOUS at 03:01

## 2021-09-06 RX ADMIN — SODIUM CHLORIDE, PRESERVATIVE FREE 10 ML: 5 INJECTION INTRAVENOUS at 19:23

## 2021-09-06 RX ADMIN — METHYLPREDNISOLONE SODIUM SUCCINATE 40 MG: 40 INJECTION, POWDER, LYOPHILIZED, FOR SOLUTION INTRAMUSCULAR; INTRAVENOUS at 16:06

## 2021-09-06 RX ADMIN — INSULIN LISPRO 1 UNITS: 100 INJECTION, SOLUTION INTRAVENOUS; SUBCUTANEOUS at 19:26

## 2021-09-06 RX ADMIN — DOCUSATE SODIUM 100 MG: 100 CAPSULE ORAL at 08:21

## 2021-09-06 RX ADMIN — VENLAFAXINE HYDROCHLORIDE 150 MG: 150 CAPSULE, EXTENDED RELEASE ORAL at 08:21

## 2021-09-06 RX ADMIN — SUCRALFATE 1 G: 1 TABLET ORAL at 16:07

## 2021-09-06 RX ADMIN — ALBUTEROL SULFATE 2 PUFF: 90 AEROSOL, METERED RESPIRATORY (INHALATION) at 19:24

## 2021-09-06 RX ADMIN — BUDESONIDE AND FORMOTEROL FUMARATE DIHYDRATE 2 PUFF: 80; 4.5 AEROSOL RESPIRATORY (INHALATION) at 19:21

## 2021-09-06 RX ADMIN — AMLODIPINE BESYLATE 10 MG: 10 TABLET ORAL at 08:21

## 2021-09-06 RX ADMIN — ATORVASTATIN CALCIUM 40 MG: 40 TABLET, FILM COATED ORAL at 08:21

## 2021-09-06 RX ADMIN — DOCUSATE SODIUM 100 MG: 100 CAPSULE ORAL at 19:21

## 2021-09-06 ASSESSMENT — PAIN SCALES - GENERAL
PAINLEVEL_OUTOF10: 0

## 2021-09-06 NOTE — PROGRESS NOTES
[facility] Progress Note    Admitting Date and Time: 9/4/2021 12:27 PM  Admit Dx: Acute respiratory failure with hypoxia ([facility] [address] [J96.01]    Subjective:  Patient is being followed for Acute respiratory failure with hypoxia ([facility] [address] [J96.01]   Pt feels better today, still coughing, improved shortness of breath, was on RA resting , dropped his sats to mid 80s ambulating. ROS: denies fever, chills, cp, n/v, HA unless stated above.       doxycycline (VIBRAMYCIN) IV  100 mg IntraVENous Q12H    amLODIPine  10 mg Oral Daily    atorvastatin  40 mg Oral Daily    budesonide-formoterol  2 puff Inhalation BID    docusate sodium  100 mg Oral BID    pantoprazole  40 mg Oral QAM AC    sucralfate  1 g Oral BID AC    tamsulosin  0.4 mg Oral Daily    venlafaxine  150 mg Oral Daily with breakfast    sodium chloride flush  5-40 mL IntraVENous 2 times per day    enoxaparin  40 mg SubCUTAneous Daily    levofloxacin  750 mg IntraVENous Q24H    insulin lispro  0-6 Units SubCUTAneous TID WC    insulin lispro  0-3 Units SubCUTAneous Nightly    methylPREDNISolone  40 mg IntraVENous Q8H     albuterol sulfate HFA, 2 puff, Q6H PRN  sodium chloride flush, 5-40 mL, PRN  sodium chloride, 25 mL, PRN  ondansetron, 4 mg, Q8H PRN   Or  ondansetron, 4 mg, Q6H PRN  polyethylene glycol, 17 g, Daily PRN  acetaminophen, 650 mg, Q6H PRN   Or  acetaminophen, 650 mg, Q6H PRN  ipratropium, 2 puff, Q4H PRN         Objective:    /82   Pulse 78   Temp 97.5 °F (36.4 °C) (Oral)   Resp 18   Ht 6' (1.829 m)   Wt 192 lb 11.2 oz (87.4 kg)   SpO2 94%   BMI 26.13 kg/m²     General Appearance: alert and oriented to person, place and time and in moderate acute distress  Skin: warm and dry  Head: normocephalic and atraumatic  Eyes: pupils equal, round, and reactive to light, extraocular eye movements intact, conjunctivae normal  Neck: neck supple and non tender without mass   Pulmonary/Chest: mild wheezing - no rales or rhonchi, normal air movement, no respiratory distress  Cardiovascular: normal rate, normal S1 and S2 and no carotid bruits  Abdomen: soft, non-tender, non-distended, normal bowel sounds, umbilical hernia  Extremities: no cyanosis, no clubbing and no edema  Neurologic: no cranial nerve deficit and speech normal        Recent Labs     09/04/21  1324 09/05/21  0300    136   K 4.0 4.0    104   CO2 22 23   BUN 18 18   CREATININE 1.0 0.7   GLUCOSE 149* 137*   CALCIUM 8.7 8.6       Recent Labs     09/04/21  1324 09/05/21  0300   WBC 23.6* 19.0*   RBC 5.30 4.30   HGB 15.5 12.8   HCT 47.1 38.8   MCV 88.9 90.2   MCH 29.2 29.8   MCHC 32.9 33.0   RDW 15.5* 15.5*    256   MPV 11.0 10.4         Assessment:    Active Problems:    Acute respiratory failure with hypoxia (HCC)  Resolved Problems:    * No resolved hospital problems. *      Plan:  1. Acute hypoxic aspiratory failure in the setting of multifocal pneumonia, O2 saturation was 84 %, recovered on 6 L then he was placed on BiPAP, still requiring 2L, treating the underlying process and wean off oxygen. 2.  Multifocal pneumonia due to RSV, with possible superimposed bacterial pneumonia, this negative for COVID-19, respiratory panel was positive for RSV, patient received cefepime and doxycycline in the ED I switched the patient to Levaquin, procalcitonin is at 0.2, negative strep and Legionella antigen  3. Sepsis with tachycardia and leukocytosis in the setting of pneumonia, started patient on IV fluids and IV antibiotics, obtain blood cultures and sputum cultures, improving  4. Elevated high-sensitivity troponin, at 68 on presentation, trended up to 116, most likely due to sepsis and pneumonia, will monitor. EKG showed sinus rhythm with no acute ischemic changes  5. History of hypertension, blood pressure is acceptable, continue amlodipine, monitor for septic shock. 6.  History of hyperlipidemia, continue statin.   7.  History of diabetes type 2 on

## 2021-09-06 NOTE — PLAN OF CARE
Problem: Skin Integrity:  Goal: Will show no infection signs and symptoms  Description: Will show no infection signs and symptoms  Outcome: Met This Shift  Goal: Absence of new skin breakdown  Description: Absence of new skin breakdown  Outcome: Met This Shift     Problem: Falls - Risk of:  Goal: Will remain free from falls  Description: Will remain free from falls  Outcome: Met This Shift  Goal: Absence of physical injury  Description: Absence of physical injury  Outcome: Met This Shift     Problem: Breathing Pattern - Ineffective:  Goal: Ability to achieve and maintain a regular respiratory rate will improve  Description: Ability to achieve and maintain a regular respiratory rate will improve  Outcome: Met This Shift

## 2021-09-06 NOTE — PROGRESS NOTES
RT Evaluation for Home Oxygen    Resting (Room Air):  SpO2:  92  HR:78      During Walk (Room Air):  SpO2:  87  HR:  82  Walk/Assistance Device:  cane   Symptoms:  sob  Comments:      During Walk (On O2):  SpO2:  91  HR:  80  O2 Device:  NC  O2 Flow Rate (L/min):  2L     Walk/Assistance Device:  Cane   Symptoms:  sob    After Walk:  SpO2:  91  HR:  79  O2 Device:  NC  O2 Flow Rate (L/min):  2L      Electronically signed by Laura Marcus RN on 9/6/2021 at 11:31 AM

## 2021-09-07 VITALS
OXYGEN SATURATION: 93 % | SYSTOLIC BLOOD PRESSURE: 126 MMHG | WEIGHT: 192.7 LBS | TEMPERATURE: 97.6 F | HEIGHT: 72 IN | DIASTOLIC BLOOD PRESSURE: 75 MMHG | BODY MASS INDEX: 26.1 KG/M2 | HEART RATE: 79 BPM | RESPIRATION RATE: 18 BRPM

## 2021-09-07 LAB
ANION GAP SERPL CALCULATED.3IONS-SCNC: 11 MMOL/L (ref 7–16)
ANISOCYTOSIS: ABNORMAL
BASOPHILS ABSOLUTE: 0 E9/L (ref 0–0.2)
BASOPHILS RELATIVE PERCENT: 0 % (ref 0–2)
BUN BLDV-MCNC: 21 MG/DL (ref 6–23)
BURR CELLS: ABNORMAL
CALCIUM SERPL-MCNC: 8.5 MG/DL (ref 8.6–10.2)
CHLORIDE BLD-SCNC: 105 MMOL/L (ref 98–107)
CO2: 24 MMOL/L (ref 22–29)
CREAT SERPL-MCNC: 0.8 MG/DL (ref 0.7–1.2)
CULTURE, RESPIRATORY: NORMAL
EKG ATRIAL RATE: 98 BPM
EKG P AXIS: 72 DEGREES
EKG P-R INTERVAL: 162 MS
EKG Q-T INTERVAL: 346 MS
EKG QRS DURATION: 94 MS
EKG QTC CALCULATION (BAZETT): 441 MS
EKG R AXIS: -58 DEGREES
EKG T AXIS: 40 DEGREES
EKG VENTRICULAR RATE: 98 BPM
EOSINOPHILS ABSOLUTE: 0 E9/L (ref 0.05–0.5)
EOSINOPHILS RELATIVE PERCENT: 0 % (ref 0–6)
GFR AFRICAN AMERICAN: >60
GFR NON-AFRICAN AMERICAN: >60 ML/MIN/1.73
GLUCOSE BLD-MCNC: 145 MG/DL (ref 74–99)
HCT VFR BLD CALC: 41 % (ref 37–54)
HEMOGLOBIN: 13.7 G/DL (ref 12.5–16.5)
LYMPHOCYTES ABSOLUTE: 1.86 E9/L (ref 1.5–4)
LYMPHOCYTES RELATIVE PERCENT: 10 % (ref 20–42)
MCH RBC QN AUTO: 29.3 PG (ref 26–35)
MCHC RBC AUTO-ENTMCNC: 33.4 % (ref 32–34.5)
MCV RBC AUTO: 87.8 FL (ref 80–99.9)
METER GLUCOSE: 151 MG/DL (ref 74–99)
METER GLUCOSE: 175 MG/DL (ref 74–99)
MONOCYTES ABSOLUTE: 0.56 E9/L (ref 0.1–0.95)
MONOCYTES RELATIVE PERCENT: 3 % (ref 2–12)
NEUTROPHILS ABSOLUTE: 16.18 E9/L (ref 1.8–7.3)
NEUTROPHILS RELATIVE PERCENT: 87 % (ref 43–80)
OVALOCYTES: ABNORMAL
PDW BLD-RTO: 15.5 FL (ref 11.5–15)
PLATELET # BLD: 335 E9/L (ref 130–450)
PMV BLD AUTO: 10.8 FL (ref 7–12)
POIKILOCYTES: ABNORMAL
POTASSIUM SERPL-SCNC: 4 MMOL/L (ref 3.5–5)
RBC # BLD: 4.67 E12/L (ref 3.8–5.8)
SMEAR, RESPIRATORY: NORMAL
SODIUM BLD-SCNC: 140 MMOL/L (ref 132–146)
WBC # BLD: 18.6 E9/L (ref 4.5–11.5)

## 2021-09-07 PROCEDURE — 6370000000 HC RX 637 (ALT 250 FOR IP): Performed by: INTERNAL MEDICINE

## 2021-09-07 PROCEDURE — 2580000003 HC RX 258: Performed by: INTERNAL MEDICINE

## 2021-09-07 PROCEDURE — 94660 CPAP INITIATION&MGMT: CPT

## 2021-09-07 PROCEDURE — 6360000002 HC RX W HCPCS: Performed by: INTERNAL MEDICINE

## 2021-09-07 PROCEDURE — 36415 COLL VENOUS BLD VENIPUNCTURE: CPT

## 2021-09-07 PROCEDURE — 2700000000 HC OXYGEN THERAPY PER DAY

## 2021-09-07 PROCEDURE — 82962 GLUCOSE BLOOD TEST: CPT

## 2021-09-07 PROCEDURE — 80048 BASIC METABOLIC PNL TOTAL CA: CPT

## 2021-09-07 PROCEDURE — 93010 ELECTROCARDIOGRAM REPORT: CPT | Performed by: INTERNAL MEDICINE

## 2021-09-07 PROCEDURE — 85025 COMPLETE CBC W/AUTO DIFF WBC: CPT

## 2021-09-07 PROCEDURE — 2580000003 HC RX 258: Performed by: EMERGENCY MEDICINE

## 2021-09-07 PROCEDURE — 2500000003 HC RX 250 WO HCPCS: Performed by: EMERGENCY MEDICINE

## 2021-09-07 PROCEDURE — 99239 HOSP IP/OBS DSCHRG MGMT >30: CPT | Performed by: INTERNAL MEDICINE

## 2021-09-07 RX ORDER — PREDNISONE 10 MG/1
40 TABLET ORAL DAILY
Qty: 20 TABLET | Refills: 0 | Status: SHIPPED | OUTPATIENT
Start: 2021-09-07 | End: 2021-09-12

## 2021-09-07 RX ORDER — DOXYCYCLINE HYCLATE 100 MG
100 TABLET ORAL 2 TIMES DAILY
Qty: 10 TABLET | Refills: 0 | Status: SHIPPED | OUTPATIENT
Start: 2021-09-07 | End: 2021-09-12

## 2021-09-07 RX ORDER — LEVOFLOXACIN 750 MG/1
750 TABLET ORAL DAILY
Qty: 5 TABLET | Refills: 0 | Status: SHIPPED | OUTPATIENT
Start: 2021-09-07 | End: 2021-09-12

## 2021-09-07 RX ADMIN — DOXYCYCLINE 100 MG: 100 INJECTION, POWDER, LYOPHILIZED, FOR SOLUTION INTRAVENOUS at 02:52

## 2021-09-07 RX ADMIN — DOCUSATE SODIUM 100 MG: 100 CAPSULE ORAL at 09:20

## 2021-09-07 RX ADMIN — ENOXAPARIN SODIUM 40 MG: 40 INJECTION SUBCUTANEOUS at 09:20

## 2021-09-07 RX ADMIN — AMLODIPINE BESYLATE 10 MG: 10 TABLET ORAL at 09:20

## 2021-09-07 RX ADMIN — METHYLPREDNISOLONE SODIUM SUCCINATE 40 MG: 40 INJECTION, POWDER, LYOPHILIZED, FOR SOLUTION INTRAMUSCULAR; INTRAVENOUS at 00:10

## 2021-09-07 RX ADMIN — INSULIN LISPRO 1 UNITS: 100 INJECTION, SOLUTION INTRAVENOUS; SUBCUTANEOUS at 06:00

## 2021-09-07 RX ADMIN — PANTOPRAZOLE SODIUM 40 MG: 40 TABLET, DELAYED RELEASE ORAL at 05:09

## 2021-09-07 RX ADMIN — TAMSULOSIN HYDROCHLORIDE 0.4 MG: 0.4 CAPSULE ORAL at 09:22

## 2021-09-07 RX ADMIN — METHYLPREDNISOLONE SODIUM SUCCINATE 40 MG: 40 INJECTION, POWDER, LYOPHILIZED, FOR SOLUTION INTRAMUSCULAR; INTRAVENOUS at 09:21

## 2021-09-07 RX ADMIN — BUDESONIDE AND FORMOTEROL FUMARATE DIHYDRATE 2 PUFF: 80; 4.5 AEROSOL RESPIRATORY (INHALATION) at 09:24

## 2021-09-07 RX ADMIN — SUCRALFATE 1 G: 1 TABLET ORAL at 05:09

## 2021-09-07 RX ADMIN — VENLAFAXINE HYDROCHLORIDE 150 MG: 150 CAPSULE, EXTENDED RELEASE ORAL at 09:20

## 2021-09-07 RX ADMIN — ATORVASTATIN CALCIUM 40 MG: 40 TABLET, FILM COATED ORAL at 09:22

## 2021-09-07 RX ADMIN — SODIUM CHLORIDE, PRESERVATIVE FREE 10 ML: 5 INJECTION INTRAVENOUS at 09:21

## 2021-09-07 ASSESSMENT — PAIN SCALES - GENERAL
PAINLEVEL_OUTOF10: 0

## 2021-09-07 NOTE — PROGRESS NOTES
oxygen sat on room air 93%. after 2 minutes of ambulation on room air,sat 91%. denies shortness of breath.  Dr Elsa Hanson notified

## 2021-09-07 NOTE — PROGRESS NOTES
09/06/21 2027   NIV Type   Mode Bilevel  (refuses use remains standby )   Settings/Measurements   O2 Flow Rate (L/min) 2 L/min   Oxygen Therapy/Pulse Ox   O2 Therapy Oxygen   O2 Device Nasal cannula   SpO2 94 %   Pulse Oximeter Device Mode Continuous

## 2021-09-07 NOTE — DISCHARGE SUMMARY
[facility] Physician Discharge Summary       Kenyon Wiseman, DO  [address]  [place] [address]  [phone]    Schedule an appointment as soon as possible for a visit in 1 week  follow up      Activity level: As tolerated     Dispo: home      Condition on discharge: Stable     Patient ID:  Litzy Toussaint  [id number]  80 y.o.  1950    Admit date: 9/4/2021    Discharge date and time:  9/7/2021  3:45 PM    Admission Diagnoses: Active Problems:    Acute respiratory failure with hypoxia (HCC)  Resolved Problems:    * No resolved hospital problems. *      Discharge Diagnoses: Active Problems:    Acute respiratory failure with hypoxia (HCC)  Resolved Problems:    * No resolved hospital problems. *      Consults:  None      Hospital Course:   Patient Litzy Toussaint is a 80 y.o. presented with Acute respiratory failure with hypoxia ([facility] [address] [J96.01]    1.  Acute hypoxic aspiratory failure in the setting of multifocal pneumonia, O2 saturation was 84 %, recovered on 6 L then he was placed on BiPAP, still requiring 2L, treating the underlying process and wean off oxygen. 2.  Multifocal pneumonia due to RSV, with possible superimposed bacterial pneumonia, this negative for COVID-19, respiratory panel was positive for RSV, patient received cefepime and doxycycline in the ED I switched the patient to Levaquin, procalcitonin is at 0.2, negative strep and Legionella antigen, will discharge the patient stable home on levaquin  3.  Sepsis with tachycardia and leukocytosis in the setting of pneumonia, started patient on IV fluids and IV antibiotics, negative blood cultures and sputum cultures, improving  4.  Elevated high-sensitivity troponin, at 68 on presentation, trended up to 116 then down to 27, most likely due to sepsis and pneumonia.  EKG showed sinus rhythm with no acute ischemic changes  5.  History of hypertension, blood pressure is acceptable, continue amlodipine, monitor for septic shock.  6.  History of hyperlipidemia, continue statin. 7.  History of diabetes type 2 on Metformin at home, will hold and start patient on sliding scale insulin.  Obtained A1c 6.4  8.  Acute exacerbation of COPD, start the patient on bronchodilators and steroids      Discharge Exam:  General Appearance: alert and oriented to person, place and time and in moderate acute distress  Skin: warm and dry  Head: normocephalic and atraumatic  Eyes: pupils equal, round, and reactive to light, extraocular eye movements intact, conjunctivae normal  Neck: neck supple and non tender without mass   Pulmonary/Chest: mild wheezing - no rales or rhonchi, normal air movement, no respiratory distress  Cardiovascular: normal rate, normal S1 and S2 and no carotid bruits  Abdomen: soft, non-tender, non-distended, normal bowel sounds, umbilical hernia  Extremities: no cyanosis, no clubbing and no edema  Neurologic: no cranial nerve deficit and speech normal    I/O last 3 completed shifts: In: 720 [P.O.:720]  Out: -   No intake/output data recorded. LABS:  Recent Labs     09/05/21 0300 09/07/21  0952    140   K 4.0 4.0    105   CO2 23 24   BUN 18 21   CREATININE 0.7 0.8   GLUCOSE 137* 145*   CALCIUM 8.6 8.5*       Recent Labs     09/05/21  0300 09/07/21  0952   WBC 19.0* 18.6*   RBC 4.30 4.67   HGB 12.8 13.7   HCT 38.8 41.0   MCV 90.2 87.8   MCH 29.8 29.3   MCHC 33.0 33.4   RDW 15.5* 15.5*    335   MPV 10.4 10.8       No results for input(s): POCGLU in the last 72 hours. Imaging:  XR CHEST PORTABLE    Result Date: 9/4/2021  EXAMINATION: ONE XRAY VIEW OF THE CHEST 9/4/2021 1:13 pm COMPARISON: 08/28/2018 HISTORY: ORDERING SYSTEM PROVIDED HISTORY: shortness of breath TECHNOLOGIST PROVIDED HISTORY: Reason for exam:->shortness of breath FINDINGS: Multifocal bilateral patchy pulmonary infiltrates are noted. The infiltrates are ground-glass in appearance The cardiac silhouette is within normal limits. . There is a large hiatal hernia. 1. Multifocal bilateral pneumonia more prominent within the right lung. 2. Large hiatal hernia     CTA PULMONARY W CONTRAST    Result Date: 9/4/2021  EXAMINATION: CTA OF THE CHEST 9/4/2021 3:18 pm TECHNIQUE: CTA of the chest was performed after the administration of intravenous contrast.  Multiplanar reformatted images are provided for review. MIP images are provided for review. Dose modulation, iterative reconstruction, and/or weight based adjustment of the mA/kV was utilized to reduce the radiation dose to as low as reasonably achievable. COMPARISON: None. HISTORY: ORDERING SYSTEM PROVIDED HISTORY: eval of COPD, concern for COVID TECHNOLOGIST PROVIDED HISTORY: Reason for exam:->eval of COPD, concern for COVID Decision Support Exception - unselect if not a suspected or confirmed emergency medical condition->Emergency Medical Condition (MA) FINDINGS: Pulmonary Arteries: Pulmonary arteries are adequately opacified for evaluation. No evidence of intraluminal filling defect to suggest pulmonary embolism. Main pulmonary artery is normal in caliber. Mediastinum: No evidence of mediastinal lymphadenopathy. The heart and pericardium demonstrate no acute abnormality. There is no acute abnormality of the thoracic aorta. Lungs/pleura: Multifocal small vague patchy pulmonary infiltrates are noted. The findings do not demonstrate the typical appearance for COVID pneumonia. There is no pleural effusion or pleural thickening. Upper Abdomen: There is a large hiatal hernia. Soft Tissues/Bones: No acute bone or soft tissue abnormality. 1. There is no evidence of a pulmonary embolus 2. Large hiatal hernia. 3. Very small multifocal bilateral pulmonary infiltrates more prominent within the right lung. The findings represent pneumonia. The appearance of the infiltrates are not typical for COVID pneumonia. The findings can represent atypical pneumonia or possibly early COVID pneumonia.        Patient Instructions:      Medication List      START taking these medications    doxycycline hyclate 100 MG tablet  Commonly known as: VIBRA-TABS  Take 1 tablet by mouth 2 times daily for 5 days     levoFLOXacin 750 MG tablet  Commonly known as: Levaquin  Take 1 tablet by mouth daily for 5 days     predniSONE 10 MG tablet  Commonly known as: DELTASONE  Take 4 tablets by mouth daily for 5 days        CONTINUE taking these medications    albuterol sulfate  (90 Base) MCG/ACT inhaler  INHALE 2 PUFFS INTO THE LUNGS EVERY 6 HOURS AS NEEDED FOR WHEEZING     amLODIPine 10 MG tablet  Commonly known as: NORVASC  TAKE 1 TABLET EVERY DAY     ARIPiprazole 5 MG tablet  Commonly known as: ABILIFY  TAKE 1 TABLET EVERY EVENING     aspirin 81 MG tablet     atorvastatin 40 MG tablet  Commonly known as: LIPITOR  TAKE 1 TABLET EVERY DAY     blood glucose test strips  Test one times a day & as needed for symptoms of irregular blood glucose. budesonide 0.25 MG/2ML nebulizer suspension  Commonly known as: PULMICORT  Take 2 mLs by nebulization 2 times daily     budesonide-formoterol 80-4.5 MCG/ACT Aero  Commonly known as: SYMBICORT  Inhale 2 puffs into the lungs 2 times daily     docusate sodium 100 MG capsule  Commonly known as: Colace  Take 1 capsule by mouth 2 times daily     famotidine 20 MG tablet  Commonly known as: PEPCID  Take 1 tablet by mouth 2 times daily     ipratropium-albuterol 0.5-2.5 (3) MG/3ML Soln nebulizer solution  Commonly known as: DUONEB  INHALE THE CONTENTS OF 1 VIAL VIA NEBULIZER EVERY 6 HOURS AS NEEDED FOR SHORTNESS OF BREATH     Lancets Misc  Use lancet to check sugar in the AM before meals.      metFORMIN 500 MG extended release tablet  Commonly known as: GLUCOPHAGE-XR  TAKE 1 TABLET EVERY DAY WITH BREAKFAST     naproxen 500 MG tablet  Commonly known as: Naprosyn  Take 1 tablet by mouth daily     omeprazole 20 MG delayed release capsule  Commonly known as: PRILOSEC  TAKE 1 CAPSULE TWICE DAILY     polyethylene glycol 17 GM/SCOOP powder  Commonly known as: GLYCOLAX     Spiriva HandiHaler 18 MCG inhalation capsule  Generic drug: tiotropium  Inhale 1 capsule into the lungs daily     sucralfate 1 GM tablet  Commonly known as: CARAFATE     tamsulosin 0.4 MG capsule  Commonly known as: FLOMAX  TAKE 1 CAPSULE EVERY NIGHT     venlafaxine 150 MG extended release capsule  Commonly known as: EFFEXOR XR  TAKE 1 CAPSULE EVERY DAY           Where to Get Your Medications      These medications were sent to [address] [address]    Phone: [phone]   · doxycycline hyclate 100 MG tablet  · levoFLOXacin 750 MG tablet  · predniSONE 10 MG tablet           Note that more than 30 minutes was spent in preparing discharge papers, discussing discharge with patient, medication review, etc.    Signed:  Electronically signed by Sultana Yung MD on 9/7/2021 at 3:45 PM

## 2021-09-07 NOTE — CARE COORDINATION
9/7/2021  Social Work Discharge Planning: COVID NEG. This worker met with Pt to discuss  role and transition of care/discharge planning. Pt and spouse reside together in a 2 story home with bed and bath on the second floor. Pt said the steps are becoming difficult and they are planning on either moving or having their bed and bath changed to the first floor. Pt has a nebulizer but no home o2. Pt is currently on 2l o2 here and has no provider preference if unable to wean off 2l. Wean o2. Pt is also on IV ATB and has no preference for HHC if needed for IV ATB. PCP is Dr. Jes Fitzpatrick.  Electronically signed by MARTHA Garrido on 9/7/2021 at 11:10 AM

## 2021-09-07 NOTE — PROGRESS NOTES
CLINICAL PHARMACY NOTE: MEDS TO BEDS    Total # of Prescriptions Filled: 3   The following medications were delivered to the patient:  · Doxycycline 100 mg  · Prednisone 10 mg  · levaquin    Additional Documentation:

## 2021-09-08 ENCOUNTER — CARE COORDINATION (OUTPATIENT)
Dept: CARE COORDINATION | Age: 71
End: 2021-09-08

## 2021-09-08 NOTE — CARE COORDINATION
Care Transitions Outreach Attempt    Call within 2 business days of discharge: Yes   Attempted to reach patient for transitions of care follow up. Unable to reach patient. Left HIPAA compliant VM with contact information. Will attempt to contact patient again. Patient: Vivian Costa Patient : 1950 MRN: [id number]    Last Discharge [facility]       Complaint Diagnosis Description Type Department Provider    21 Concern For COVID-19; Cough; Shortness of Breath Acute respiratory failure with hypoxia ([address] . .. ED to Hosp-Admission (Discharged) (ADMITTED) [facility] Jarad Mederos MD; Brian Moraes. .. Was this an external facility discharge? No Discharge Facility: [facility]    Noted following upcoming appointments from discharge chart review:   [facility] follow up appointment(s): No future appointments.   Non-[facility] follow up appointment(s):

## 2021-09-09 ENCOUNTER — CARE COORDINATION (OUTPATIENT)
Dept: CARE COORDINATION | Age: 71
End: 2021-09-09

## 2021-09-09 LAB
BLOOD CULTURE, ROUTINE: NORMAL
CULTURE, BLOOD 2: NORMAL

## 2021-09-09 NOTE — CARE COORDINATION
Care Transitions Outreach Attempt    Call within 2 business days of discharge: Yes   Attempted to reach patient for transitions of care follow up. Unable to reach patient. Left HIPAA compliant VM with contact information. If no patient call back, episode will be resolved. Patient: Abril Dupont Patient : 1950 MRN: [id number]    Last Discharge [facility]       Complaint Diagnosis Description Type Department Provider    21 Concern For COVID-19; Cough; Shortness of Breath Acute respiratory failure with hypoxia ([address] . .. ED to Hosp-Admission (Discharged) (ADMITTED) [facility] Rolanda Burnett MD; Angelica Rick. .. Was this an external facility discharge? No Discharge Facility: [facility]    Noted following upcoming appointments from discharge chart review:   [facility] follow up appointment(s): No future appointments.   Non-[facility] follow up appointment(s):

## 2021-09-10 ENCOUNTER — TELEPHONE (OUTPATIENT)
Dept: FAMILY MEDICINE CLINIC | Age: 71
End: 2021-09-10

## 2021-09-15 ENCOUNTER — OFFICE VISIT (OUTPATIENT)
Dept: FAMILY MEDICINE CLINIC | Age: 71
End: 2021-09-15
Payer: MEDICARE

## 2021-09-15 VITALS
HEART RATE: 82 BPM | RESPIRATION RATE: 20 BRPM | WEIGHT: 187.2 LBS | TEMPERATURE: 97.2 F | SYSTOLIC BLOOD PRESSURE: 113 MMHG | HEIGHT: 72 IN | BODY MASS INDEX: 25.35 KG/M2 | DIASTOLIC BLOOD PRESSURE: 79 MMHG | OXYGEN SATURATION: 96 %

## 2021-09-15 DIAGNOSIS — J44.1 COPD EXACERBATION (HCC): ICD-10-CM

## 2021-09-15 DIAGNOSIS — K44.9 HIATAL HERNIA WITH GASTROESOPHAGEAL REFLUX: ICD-10-CM

## 2021-09-15 DIAGNOSIS — J96.01 ACUTE RESPIRATORY FAILURE WITH HYPOXIA (HCC): Primary | ICD-10-CM

## 2021-09-15 DIAGNOSIS — K21.9 HIATAL HERNIA WITH GASTROESOPHAGEAL REFLUX: ICD-10-CM

## 2021-09-15 DIAGNOSIS — E11.9 TYPE 2 DIABETES MELLITUS WITHOUT COMPLICATION, WITHOUT LONG-TERM CURRENT USE OF INSULIN (HCC): ICD-10-CM

## 2021-09-15 PROCEDURE — 99495 TRANSJ CARE MGMT MOD F2F 14D: CPT | Performed by: STUDENT IN AN ORGANIZED HEALTH CARE EDUCATION/TRAINING PROGRAM

## 2021-09-15 PROCEDURE — 1111F DSCHRG MED/CURRENT MED MERGE: CPT | Performed by: STUDENT IN AN ORGANIZED HEALTH CARE EDUCATION/TRAINING PROGRAM

## 2021-09-15 SDOH — ECONOMIC STABILITY: FOOD INSECURITY: WITHIN THE PAST 12 MONTHS, YOU WORRIED THAT YOUR FOOD WOULD RUN OUT BEFORE YOU GOT MONEY TO BUY MORE.: NEVER TRUE

## 2021-09-15 SDOH — ECONOMIC STABILITY: FOOD INSECURITY: WITHIN THE PAST 12 MONTHS, THE FOOD YOU BOUGHT JUST DIDN'T LAST AND YOU DIDN'T HAVE MONEY TO GET MORE.: NEVER TRUE

## 2021-09-15 ASSESSMENT — SOCIAL DETERMINANTS OF HEALTH (SDOH): HOW HARD IS IT FOR YOU TO PAY FOR THE VERY BASICS LIKE FOOD, HOUSING, MEDICAL CARE, AND HEATING?: NOT VERY HARD

## 2021-09-15 NOTE — PROGRESS NOTES
Post-Discharge Transitional Care Management Services or Hospital Follow Up      Wilfredo Ulloa   YOB: 1950    Date of Office Visit:  9/15/2021  Date of Hospital Admission: 9/4/21  Date of Hospital Discharge: 9/7/21  Risk of hospital readmission (high >=14%.  Medium >=10%) :Readmission Risk Score: 14      Care management risk score Rising risk (score 2-5) and Complex Care (Scores >=6): 3     Non face to face  following discharge, date last encounter closed (first attempt may have been earlier): 9/9/2021 11:38 AM    Call initiated 2 business days of discharge: Yes    Patient Active Problem List   Diagnosis    At high risk for injury related to fall    History of traumatic head injury    Post-traumatic headache    Hyperlipidemia    Essential (primary) hypertension    GERD (gastroesophageal reflux disease)    Frequency of urination    Major depression, chronic    Bilateral carotid artery stenosis    Type 2 diabetes mellitus without complication, without long-term current use of insulin (HCC)    COPD (chronic obstructive pulmonary disease) ([address]    Greater trochanteric pain syndrome of left lower extremity    Chronic pain of both hips    Acute respiratory failure with hypoxia ([address]       Allergies   Allergen Reactions    Penicillins Hives       Medications listed as ordered at the time of discharge from hospital   [facility] Medication Instructions [facility]:    Printed on:09/15/21 9722   Medication Information                      albuterol sulfate  (90 Base) MCG/ACT inhaler  INHALE 2 PUFFS INTO THE LUNGS EVERY 6 HOURS AS NEEDED FOR WHEEZING             amLODIPine (NORVASC) 10 MG tablet  TAKE 1 TABLET EVERY DAY             ARIPiprazole (ABILIFY) 5 MG tablet  TAKE 1 TABLET EVERY EVENING             aspirin 81 MG tablet  Take 81 mg by mouth daily             atorvastatin (LIPITOR) 40 MG tablet  TAKE 1 TABLET EVERY DAY             blood glucose monitor strips  Test one times a EVERY DAY 90 tablet 0    metFORMIN (GLUCOPHAGE-XR) 500 MG extended release tablet TAKE 1 TABLET EVERY DAY WITH BREAKFAST 90 tablet 0    venlafaxine (EFFEXOR XR) 150 MG extended release capsule TAKE 1 CAPSULE EVERY DAY 90 capsule 0    ARIPiprazole (ABILIFY) 5 MG tablet TAKE 1 TABLET EVERY EVENING 90 tablet 0    tamsulosin (FLOMAX) 0.4 MG capsule TAKE 1 CAPSULE EVERY NIGHT 90 capsule 0    naproxen (NAPROSYN) 500 MG tablet Take 1 tablet by mouth daily 60 tablet 3    docusate sodium (COLACE) 100 MG capsule Take 1 capsule by mouth 2 times daily 30 capsule 0    sucralfate (CARAFATE) 1 GM tablet Take 1 g by mouth 2 times daily (before meals)      blood glucose monitor strips Test one times a day & as needed for symptoms of irregular blood glucose. 100 strip 1    budesonide-formoterol (SYMBICORT) 80-4.5 MCG/ACT AERO Inhale 2 puffs into the lungs 2 times daily 3 Inhaler 0    famotidine (PEPCID) 20 MG tablet Take 1 tablet by mouth 2 times daily 60 tablet 0    Lancets MISC Use lancet to check sugar in the AM before meals. 100 each 3    polyethylene glycol (GLYCOLAX) powder Take 17 g by mouth daily       aspirin 81 MG tablet Take 81 mg by mouth daily          Medications patient taking as of now reconciled against medications ordered at time of hospital discharge: Yes    Chief Complaint   Patient presents with   [address] from Hospital       History of Present illness - Follow up of Hospital diagnosis(es): in for acute resp failure. Needed bipap thearpy. Able to wean prior to discharge. +RSV    Inpatient course: Discharge summary reviewed- see chart. Interval history/Current status: having some congestion in throat and chest. Finished steroid. Noted sugars were high.      Vitals:    09/15/21 1431 09/15/21 1433   BP: (!) 124/90 113/79   Site: Right Upper Arm Right Upper Arm   Position: Sitting Sitting   Cuff Size: Medium Adult Medium Adult   Pulse: 80 82   Resp: 20    Temp: 97.2 °F (36.2 °C)    TempSrc: Temporal SpO2: 96%    Weight: 187 lb 3.2 oz (84.9 kg)    Height: 6' (1.829 m)      Body mass index is 25.39 kg/m². Wt Readings from Last 3 Encounters:   09/15/21 187 lb 3.2 oz (84.9 kg)   09/04/21 192 lb 11.2 oz (87.4 kg)   12/16/20 194 lb (88 kg)     BP Readings from Last 3 Encounters:   09/15/21 113/79   09/07/21 126/75   12/07/20 136/80      Physical Exam:  General Appearance: alert and oriented to person, place and time, well-developed and well-nourished, in no acute distress  Pulmonary/Chest: clear to auscultation bilaterally- no wheezes, rales or rhonchi, normal air movement, no respiratory distress. Cardiovascular: normal rate and normal S1 and S2  Extremities: no cyanosis, no clubbing and no edema    Assessment/Plan:  1. Acute respiratory failure with hypoxia (HCC)  Resolved. No O2 needs. Still with residual cough and congestion but improving. Continue same nebulizer and symbicort. - NM DISCHARGE MEDS RECONCILED W/ CURRENT OUTPATIENT MED LIST    2. Hiatal hernia with gastroesophageal reflux  Noted on recent CT. Large. Patient with longstanding reflux and abdominal pain. May benefit from EGD. - Janie Garces MD, Gastroenterology, [place]    3. COPD exacerbation ([address]  Resolved. Finished meds. Breathing slowly improving. 4. Type 2 diabetes mellitus without complication, without long-term current use of insulin (HCC)  Increased sugar with steroid. Now off. Should improved. Continue same.          Medical Decision Making: moderate complexity

## 2021-09-20 ENCOUNTER — TELEPHONE (OUTPATIENT)
Dept: FAMILY MEDICINE CLINIC | Age: 71
End: 2021-09-20

## 2021-09-20 NOTE — TELEPHONE ENCOUNTER
----- Message from TERRY CLEMENT sent at 9/20/2021 12:42 PM EDT -----  Subject: Referral Request    QUESTIONS   Reason for referral request? Gastroenterologist for hernia   Has the physician seen you for this condition before? No   Preferred Specialist (if applicable)? Do you already have an appointment scheduled? No  Additional Information for Provider? Patient would like to be referred   back to . His wife advised it is not far for them to drive for   their insurance and that they a good to go to see him.  ---------------------------------------------------------------------------  --------------  [address]  What is the best way for the office to contact you? OK to leave message on   voicemail  Preferred Call Back Phone Number?  [phone]

## 2021-09-21 NOTE — TELEPHONE ENCOUNTER
Patient previously referred to [id number] [place] [address] Gastroenterology. Please make sure referral was faxed.  Thanks

## 2021-09-21 NOTE — TELEPHONE ENCOUNTER
Referral is internal nothing gets faxed , but I looked into the referral and it is closed , reason : pt refused service

## 2021-09-21 NOTE — TELEPHONE ENCOUNTER
So maybe he initially refused but now he thinks he wants to see this person? Can we check with the patient to figure out what he wants to do.

## 2021-09-24 ENCOUNTER — TELEPHONE (OUTPATIENT)
Dept: FAMILY MEDICINE CLINIC | Age: 71
End: 2021-09-24

## 2021-09-24 DIAGNOSIS — K44.9 HIATAL HERNIA WITH GASTROESOPHAGEAL REFLUX: Primary | ICD-10-CM

## 2021-09-24 DIAGNOSIS — K21.9 HIATAL HERNIA WITH GASTROESOPHAGEAL REFLUX: Primary | ICD-10-CM

## 2021-09-24 NOTE — TELEPHONE ENCOUNTER
----- Message from Barbara Zeng MA sent at 9/24/2021 10:59 AM EDT -----  Subject: Referral Request    QUESTIONS   Reason for referral request? Referral to Gastro   Has the physician seen you for this condition before? Yes  Select a date? 2021-09-15  Select the Provider the patient wants to be referred to, if known (PCP or   Specialist)? Faustino Acuña   Preferred Specialist (if applicable)? Do you already have an appointment scheduled? No  Additional Information for Provider? Referral was closed due to travel. Please issue referral again, travel has been fixed. ---------------------------------------------------------------------------  --------------  Joana Carrier INFO  What is the best way for the office to contact you? OK to leave message on   voicemail  Preferred Call Back Phone Number?  [phone]

## 2021-09-28 ENCOUNTER — TELEPHONE (OUTPATIENT)
Dept: GASTROENTEROLOGY | Age: 71
End: 2021-09-28

## 2021-09-28 NOTE — TELEPHONE ENCOUNTER
Called and spoke to patient's wife regarding referral from Dr. Azeb Marie. Pt is scheduled on 11/04/2021 @ 2:00pm in the [place] office. Pt was instructed to bring ID, insurance card, list of medications, and co-payment to this appt. Pt's wife verbalized understanding.    Electronically signed by Eliza Glasgow MA on 9/28/2021 at 1:54 PM

## 2021-10-01 ENCOUNTER — TELEPHONE (OUTPATIENT)
Dept: GASTROENTEROLOGY | Age: 71
End: 2021-10-01

## 2021-10-01 NOTE — TELEPHONE ENCOUNTER
Called and spoke to pt's wife to reschedule appt from 11/4 due to dr being out of the office. Pt is rescheduled for 11/15/2021 @ 1:00pm in the [place] office. Pt's wife verbalized understanding.    Electronically signed by Dennis oLndono MA on 10/1/2021 at 11:43 AM

## 2021-10-30 DIAGNOSIS — R05.9 COUGH: ICD-10-CM

## 2021-11-02 RX ORDER — ALBUTEROL SULFATE 90 UG/1
2 AEROSOL, METERED RESPIRATORY (INHALATION) EVERY 6 HOURS PRN
Qty: 54 G | Refills: 0 | Status: SHIPPED
Start: 2021-11-02 | End: 2022-01-10 | Stop reason: SDUPTHER

## 2021-11-11 DIAGNOSIS — J44.1 COPD EXACERBATION (HCC): ICD-10-CM

## 2021-11-11 DIAGNOSIS — J43.9 PULMONARY EMPHYSEMA, UNSPECIFIED EMPHYSEMA TYPE (HCC): ICD-10-CM

## 2021-11-11 DIAGNOSIS — M25.551 RIGHT HIP PAIN: ICD-10-CM

## 2021-11-11 RX ORDER — BUDESONIDE AND FORMOTEROL FUMARATE DIHYDRATE 80; 4.5 UG/1; UG/1
2 AEROSOL RESPIRATORY (INHALATION) 2 TIMES DAILY
Qty: 3 EACH | Refills: 1 | Status: SHIPPED
Start: 2021-11-11 | End: 2022-05-03

## 2021-11-11 RX ORDER — NAPROXEN 500 MG/1
500 TABLET ORAL DAILY
Qty: 60 TABLET | Refills: 3 | Status: SHIPPED
Start: 2021-11-11 | End: 2022-05-03

## 2021-12-30 ENCOUNTER — OFFICE VISIT (OUTPATIENT)
Dept: FAMILY MEDICINE CLINIC | Age: 71
End: 2021-12-30
Payer: MEDICARE

## 2021-12-30 VITALS
HEART RATE: 66 BPM | BODY MASS INDEX: 26.95 KG/M2 | WEIGHT: 199 LBS | HEIGHT: 72 IN | RESPIRATION RATE: 20 BRPM | SYSTOLIC BLOOD PRESSURE: 114 MMHG | DIASTOLIC BLOOD PRESSURE: 77 MMHG | TEMPERATURE: 97.5 F

## 2021-12-30 DIAGNOSIS — M25.552 CHRONIC PAIN OF BOTH HIPS: ICD-10-CM

## 2021-12-30 DIAGNOSIS — G89.29 CHRONIC PAIN OF BOTH HIPS: ICD-10-CM

## 2021-12-30 DIAGNOSIS — N28.1 COMPLEX RENAL CYST: Primary | ICD-10-CM

## 2021-12-30 DIAGNOSIS — R06.02 SHORTNESS OF BREATH: ICD-10-CM

## 2021-12-30 DIAGNOSIS — I70.0 AORTIC ATHEROSCLEROSIS (HCC): ICD-10-CM

## 2021-12-30 DIAGNOSIS — M88.9 PAGET DISEASE OF BONE: ICD-10-CM

## 2021-12-30 DIAGNOSIS — R10.9 STOMACH PAIN: ICD-10-CM

## 2021-12-30 DIAGNOSIS — R73.03 PREDIABETES: ICD-10-CM

## 2021-12-30 DIAGNOSIS — R94.31 EKG ABNORMALITIES: ICD-10-CM

## 2021-12-30 DIAGNOSIS — M25.551 CHRONIC PAIN OF BOTH HIPS: ICD-10-CM

## 2021-12-30 DIAGNOSIS — R07.9 CHEST PAIN, UNSPECIFIED TYPE: ICD-10-CM

## 2021-12-30 DIAGNOSIS — Z12.5 SCREENING FOR PROSTATE CANCER: ICD-10-CM

## 2021-12-30 DIAGNOSIS — M79.89 LEG SWELLING: ICD-10-CM

## 2021-12-30 LAB — HBA1C MFR BLD: 5.9 %

## 2021-12-30 PROCEDURE — G8427 DOCREV CUR MEDS BY ELIG CLIN: HCPCS | Performed by: STUDENT IN AN ORGANIZED HEALTH CARE EDUCATION/TRAINING PROGRAM

## 2021-12-30 PROCEDURE — 1036F TOBACCO NON-USER: CPT | Performed by: STUDENT IN AN ORGANIZED HEALTH CARE EDUCATION/TRAINING PROGRAM

## 2021-12-30 PROCEDURE — 3017F COLORECTAL CA SCREEN DOC REV: CPT | Performed by: STUDENT IN AN ORGANIZED HEALTH CARE EDUCATION/TRAINING PROGRAM

## 2021-12-30 PROCEDURE — G8484 FLU IMMUNIZE NO ADMIN: HCPCS | Performed by: STUDENT IN AN ORGANIZED HEALTH CARE EDUCATION/TRAINING PROGRAM

## 2021-12-30 PROCEDURE — G8417 CALC BMI ABV UP PARAM F/U: HCPCS | Performed by: STUDENT IN AN ORGANIZED HEALTH CARE EDUCATION/TRAINING PROGRAM

## 2021-12-30 PROCEDURE — 83036 HEMOGLOBIN GLYCOSYLATED A1C: CPT | Performed by: STUDENT IN AN ORGANIZED HEALTH CARE EDUCATION/TRAINING PROGRAM

## 2021-12-30 PROCEDURE — 99214 OFFICE O/P EST MOD 30 MIN: CPT | Performed by: STUDENT IN AN ORGANIZED HEALTH CARE EDUCATION/TRAINING PROGRAM

## 2021-12-30 PROCEDURE — 4040F PNEUMOC VAC/ADMIN/RCVD: CPT | Performed by: STUDENT IN AN ORGANIZED HEALTH CARE EDUCATION/TRAINING PROGRAM

## 2021-12-30 PROCEDURE — 1123F ACP DISCUSS/DSCN MKR DOCD: CPT | Performed by: STUDENT IN AN ORGANIZED HEALTH CARE EDUCATION/TRAINING PROGRAM

## 2021-12-30 NOTE — PROGRESS NOTES
Patient:  Olimpia Zee 80 y.o. male     Date of Service: 12/30/21      Chiefcomplaint:   Chief Complaint   Patient presents with    Hypertension    Diabetes     History of Present Illness   Patient presents with bilateral hip pain. This is a chronic issue but worsened over the last month. 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 He is not sure if he is still on Fosamax. He takes ibuprofen for pain. He is wondering if he can take it twice a day. He does have some stomach issues of unclear etiology. He has not had EGD but continues to have stomach problems. He was previously referred to GI twice but unclear if he went. He says he has a follow-up with a stomach doctor but is unsure who this is or what they are following up on. Also on aspirin due to history of questionable TIA. He is having swelling bilateral legs. Worse at the end of the day. He elevates with improvement. Wears compression socks with improvement. No new cough or shortness of breath. No orthopnea. No palpitations. Echo in the past with diastolic dysfunction but normal EF. This was in 2018. EKG with left anterior fascicular block    Pertinent Medical, Family, Surgical, Social History:  Past Medical History:   Diagnosis Date    Anxiety     Cerebral artery occlusion with cerebral infarction ([facility] 1990    Depression     GERD (gastroesophageal reflux disease)     Headache     Hyperlipidemia     Hypertension     Substance abuse ([facility]      Physical Exam   Vitals: /77   Pulse 66   Temp 97.5 °F (36.4 °C) (Temporal)   Resp 20   Ht 6' (1.829 m)   Wt 199 lb (90.3 kg)   BMI 26.99 kg/m²   General Appearance: Alert, oriented, no acute distress  HEENT: No scleral icterus. No visible discharge from eyes. +conjunctival injection  Neck: Not rigid. No visible masses  Chest wall/Lung: decreased breath sounds. Upper airway noisy  Heart: RRR, no murmur  Abdomen: Soft, tender generally.  No peritonitis  Extremities:  1+ edema of b/l le  Skin:

## 2022-01-10 DIAGNOSIS — R05.9 COUGH: ICD-10-CM

## 2022-01-10 RX ORDER — ALBUTEROL SULFATE 90 UG/1
2 AEROSOL, METERED RESPIRATORY (INHALATION) EVERY 6 HOURS PRN
Qty: 54 G | Refills: 1 | Status: SHIPPED
Start: 2022-01-10 | End: 2022-01-18 | Stop reason: SDUPTHER

## 2022-01-11 DIAGNOSIS — R05.9 COUGH: ICD-10-CM

## 2022-01-18 DIAGNOSIS — R05.9 COUGH: ICD-10-CM

## 2022-01-18 RX ORDER — ALBUTEROL SULFATE 90 UG/1
2 AEROSOL, METERED RESPIRATORY (INHALATION) EVERY 6 HOURS PRN
Qty: 54 G | Refills: 1 | Status: SHIPPED
Start: 2022-01-18 | End: 2022-07-01

## 2022-03-14 DIAGNOSIS — K21.9 GASTROESOPHAGEAL REFLUX DISEASE WITHOUT ESOPHAGITIS: ICD-10-CM

## 2022-03-14 DIAGNOSIS — I10 ESSENTIAL (PRIMARY) HYPERTENSION: ICD-10-CM

## 2022-03-14 DIAGNOSIS — E11.9 TYPE 2 DIABETES MELLITUS WITHOUT COMPLICATION, WITHOUT LONG-TERM CURRENT USE OF INSULIN (HCC): Chronic | ICD-10-CM

## 2022-03-14 DIAGNOSIS — R35.0 FREQUENCY OF URINATION: ICD-10-CM

## 2022-03-14 DIAGNOSIS — F31.60 MIXED BIPOLAR I DISORDER (HCC): ICD-10-CM

## 2022-03-14 DIAGNOSIS — E78.2 MIXED HYPERLIPIDEMIA: ICD-10-CM

## 2022-03-14 RX ORDER — ATORVASTATIN CALCIUM 40 MG/1
TABLET, FILM COATED ORAL
Qty: 90 TABLET | Refills: 1 | OUTPATIENT
Start: 2022-03-14

## 2022-03-14 RX ORDER — ARIPIPRAZOLE 5 MG/1
TABLET ORAL
Qty: 90 TABLET | Refills: 1 | OUTPATIENT
Start: 2022-03-14

## 2022-03-14 RX ORDER — TAMSULOSIN HYDROCHLORIDE 0.4 MG/1
CAPSULE ORAL
Qty: 90 CAPSULE | Refills: 1 | OUTPATIENT
Start: 2022-03-14

## 2022-03-14 RX ORDER — METFORMIN HYDROCHLORIDE 500 MG/1
TABLET, EXTENDED RELEASE ORAL
Qty: 90 TABLET | Refills: 1 | OUTPATIENT
Start: 2022-03-14

## 2022-03-14 RX ORDER — OMEPRAZOLE 20 MG/1
CAPSULE, DELAYED RELEASE ORAL
Qty: 180 CAPSULE | Refills: 1 | OUTPATIENT
Start: 2022-03-14

## 2022-03-14 RX ORDER — AMLODIPINE BESYLATE 10 MG/1
TABLET ORAL
Qty: 90 TABLET | Refills: 1 | OUTPATIENT
Start: 2022-03-14

## 2022-03-14 RX ORDER — VENLAFAXINE HYDROCHLORIDE 150 MG/1
CAPSULE, EXTENDED RELEASE ORAL
Qty: 90 CAPSULE | Refills: 1 | OUTPATIENT
Start: 2022-03-14

## 2022-03-17 DIAGNOSIS — E78.2 MIXED HYPERLIPIDEMIA: ICD-10-CM

## 2022-03-17 DIAGNOSIS — E11.9 TYPE 2 DIABETES MELLITUS WITHOUT COMPLICATION, WITHOUT LONG-TERM CURRENT USE OF INSULIN (HCC): Chronic | ICD-10-CM

## 2022-03-17 DIAGNOSIS — K21.9 GASTROESOPHAGEAL REFLUX DISEASE WITHOUT ESOPHAGITIS: ICD-10-CM

## 2022-03-17 DIAGNOSIS — I10 ESSENTIAL (PRIMARY) HYPERTENSION: ICD-10-CM

## 2022-03-17 DIAGNOSIS — R35.0 FREQUENCY OF URINATION: ICD-10-CM

## 2022-03-17 DIAGNOSIS — F31.60 MIXED BIPOLAR I DISORDER (HCC): ICD-10-CM

## 2022-03-17 RX ORDER — AMLODIPINE BESYLATE 10 MG/1
TABLET ORAL
Qty: 90 TABLET | Refills: 1 | Status: SHIPPED
Start: 2022-03-17 | End: 2022-08-11

## 2022-03-17 RX ORDER — TAMSULOSIN HYDROCHLORIDE 0.4 MG/1
CAPSULE ORAL
Qty: 90 CAPSULE | Refills: 1 | Status: SHIPPED
Start: 2022-03-17 | End: 2022-08-11

## 2022-03-17 RX ORDER — ATORVASTATIN CALCIUM 40 MG/1
TABLET, FILM COATED ORAL
Qty: 90 TABLET | Refills: 1 | Status: SHIPPED
Start: 2022-03-17 | End: 2022-08-11

## 2022-03-17 RX ORDER — ARIPIPRAZOLE 5 MG/1
TABLET ORAL
Qty: 90 TABLET | Refills: 1 | Status: SHIPPED
Start: 2022-03-17 | End: 2022-08-11

## 2022-03-17 RX ORDER — OMEPRAZOLE 20 MG/1
CAPSULE, DELAYED RELEASE ORAL
Qty: 180 CAPSULE | Refills: 1 | Status: SHIPPED
Start: 2022-03-17 | End: 2022-08-11

## 2022-03-17 RX ORDER — VENLAFAXINE HYDROCHLORIDE 150 MG/1
CAPSULE, EXTENDED RELEASE ORAL
Qty: 90 CAPSULE | Refills: 1 | Status: SHIPPED
Start: 2022-03-17 | End: 2022-08-11

## 2022-03-17 RX ORDER — METFORMIN HYDROCHLORIDE 500 MG/1
TABLET, EXTENDED RELEASE ORAL
Qty: 90 TABLET | Refills: 1 | Status: SHIPPED
Start: 2022-03-17 | End: 2022-08-11

## 2022-04-30 DIAGNOSIS — J44.1 COPD EXACERBATION (HCC): ICD-10-CM

## 2022-04-30 DIAGNOSIS — J43.9 PULMONARY EMPHYSEMA, UNSPECIFIED EMPHYSEMA TYPE (HCC): ICD-10-CM

## 2022-05-01 DIAGNOSIS — M25.551 RIGHT HIP PAIN: ICD-10-CM

## 2022-05-03 RX ORDER — NAPROXEN 500 MG/1
TABLET ORAL
Qty: 90 TABLET | Refills: 0 | Status: SHIPPED
Start: 2022-05-03 | End: 2022-11-01 | Stop reason: SDUPTHER

## 2022-05-03 RX ORDER — DILTIAZEM HYDROCHLORIDE 60 MG/1
TABLET, FILM COATED ORAL
Qty: 3 EACH | Refills: 1 | Status: SHIPPED | OUTPATIENT
Start: 2022-05-03

## 2022-06-30 DIAGNOSIS — R05.9 COUGH: ICD-10-CM

## 2022-07-01 RX ORDER — ALBUTEROL SULFATE 90 UG/1
2 AEROSOL, METERED RESPIRATORY (INHALATION) EVERY 6 HOURS PRN
Qty: 3 EACH | Refills: 3 | Status: SHIPPED | OUTPATIENT
Start: 2022-07-01

## 2022-08-11 DIAGNOSIS — K21.9 GASTROESOPHAGEAL REFLUX DISEASE WITHOUT ESOPHAGITIS: ICD-10-CM

## 2022-08-11 DIAGNOSIS — F31.60 MIXED BIPOLAR I DISORDER (HCC): ICD-10-CM

## 2022-08-11 DIAGNOSIS — E11.9 TYPE 2 DIABETES MELLITUS WITHOUT COMPLICATION, WITHOUT LONG-TERM CURRENT USE OF INSULIN (HCC): Chronic | ICD-10-CM

## 2022-08-11 DIAGNOSIS — I10 ESSENTIAL (PRIMARY) HYPERTENSION: ICD-10-CM

## 2022-08-11 DIAGNOSIS — E78.2 MIXED HYPERLIPIDEMIA: ICD-10-CM

## 2022-08-11 DIAGNOSIS — R35.0 FREQUENCY OF URINATION: ICD-10-CM

## 2022-08-11 RX ORDER — VENLAFAXINE HYDROCHLORIDE 150 MG/1
CAPSULE, EXTENDED RELEASE ORAL
Qty: 90 CAPSULE | Refills: 1 | Status: SHIPPED | OUTPATIENT
Start: 2022-08-11

## 2022-08-11 RX ORDER — METFORMIN HYDROCHLORIDE 500 MG/1
TABLET, EXTENDED RELEASE ORAL
Qty: 90 TABLET | Refills: 1 | Status: SHIPPED | OUTPATIENT
Start: 2022-08-11

## 2022-08-11 RX ORDER — TAMSULOSIN HYDROCHLORIDE 0.4 MG/1
CAPSULE ORAL
Qty: 90 CAPSULE | Refills: 1 | Status: SHIPPED | OUTPATIENT
Start: 2022-08-11

## 2022-08-11 RX ORDER — ARIPIPRAZOLE 5 MG/1
TABLET ORAL
Qty: 90 TABLET | Refills: 1 | Status: SHIPPED | OUTPATIENT
Start: 2022-08-11

## 2022-08-11 RX ORDER — AMLODIPINE BESYLATE 10 MG/1
TABLET ORAL
Qty: 90 TABLET | Refills: 1 | Status: SHIPPED | OUTPATIENT
Start: 2022-08-11

## 2022-08-11 RX ORDER — OMEPRAZOLE 20 MG/1
CAPSULE, DELAYED RELEASE ORAL
Qty: 180 CAPSULE | Refills: 1 | Status: SHIPPED | OUTPATIENT
Start: 2022-08-11

## 2022-08-11 RX ORDER — ATORVASTATIN CALCIUM 40 MG/1
TABLET, FILM COATED ORAL
Qty: 90 TABLET | Refills: 1 | Status: SHIPPED | OUTPATIENT
Start: 2022-08-11

## 2022-09-09 DIAGNOSIS — M25.551 RIGHT HIP PAIN: ICD-10-CM

## 2022-09-09 RX ORDER — NAPROXEN 500 MG/1
TABLET ORAL
Qty: 90 TABLET | Refills: 0 | OUTPATIENT
Start: 2022-09-09

## 2022-10-24 NOTE — TELEPHONE ENCOUNTER
Prior Authorization Form:      DEMOGRAPHICS:                     Patient Name:  Vivian Costa  Patient :  1950            Insurance:  Payor: Melyssa Mcgill / Plan: [facility] HMO / Product Type: *No Product type* /   Insurance ID Number:    Payor/Plan Subscr  Sex Relation Sub. Ins. ID Effective Group Num   1.  [address] 1950 Male  [id number]                                   [address] [address]         DIAGNOSIS & PROCEDURE:                       Procedure/Operation: colonoscopy           CPT Code: 84066    Diagnosis:  Hx of polyps    ICD10 Code: z80.10    Location:  [facility]    Surgeon:  Ruth Orourke INFORMATION:                          Date:     Time: 1000              Anesthesia:  MAC/TIVA                                                       Status:  Outpatient        Special Comments:         Electronically signed by Ania Noel MA on 10/19/2020 at 3:54 PM Writer called pt to reschedule appt on 11/18 to another day. Aníbal will be out.

## 2022-11-01 DIAGNOSIS — M25.551 RIGHT HIP PAIN: ICD-10-CM

## 2022-11-01 RX ORDER — NAPROXEN 500 MG/1
500 TABLET ORAL 2 TIMES DAILY WITH MEALS
Qty: 90 TABLET | Refills: 0 | Status: SHIPPED | OUTPATIENT
Start: 2022-11-01

## 2022-12-08 DIAGNOSIS — J43.9 PULMONARY EMPHYSEMA, UNSPECIFIED EMPHYSEMA TYPE (HCC): ICD-10-CM

## 2022-12-08 DIAGNOSIS — J44.1 COPD EXACERBATION (HCC): ICD-10-CM

## 2022-12-12 RX ORDER — DILTIAZEM HYDROCHLORIDE 60 MG/1
TABLET, FILM COATED ORAL
Qty: 3 EACH | Refills: 1 | Status: SHIPPED | OUTPATIENT
Start: 2022-12-12

## 2023-01-18 ENCOUNTER — TELEPHONE (OUTPATIENT)
Dept: FAMILY MEDICINE CLINIC | Age: 73
End: 2023-01-18

## 2023-01-18 NOTE — TELEPHONE ENCOUNTER
Received letter from dentist stating he is to have teeth pulled. They are wanting clearance. He missed his appointment and I haven't seen him in a year. Called to see how he is doing and if he can come in for evaluation. No answer and mailbox is full. Sent fax back to dentist that I can't clear him until seen.

## 2023-01-26 DIAGNOSIS — M25.551 RIGHT HIP PAIN: ICD-10-CM

## 2023-01-30 RX ORDER — NAPROXEN 500 MG/1
TABLET ORAL
Qty: 90 TABLET | Refills: 0 | OUTPATIENT
Start: 2023-01-30

## 2023-02-09 ENCOUNTER — TELEPHONE (OUTPATIENT)
Dept: FAMILY MEDICINE CLINIC | Age: 73
End: 2023-02-09

## 2023-02-09 NOTE — TELEPHONE ENCOUNTER
Pt's wife calling and states that her  is having a lot of dental surgery done and his peridontist wants to speak with Dr Gabby Horn. It is concerning his COPD and him needing O2 during procedures. Her name is Dr Britt Murphy, phone # [phone].  She would like a call please

## 2023-02-15 ENCOUNTER — OFFICE VISIT (OUTPATIENT)
Dept: FAMILY MEDICINE CLINIC | Age: 73
End: 2023-02-15

## 2023-02-15 VITALS
HEIGHT: 72 IN | RESPIRATION RATE: 24 BRPM | OXYGEN SATURATION: 99 % | WEIGHT: 187.6 LBS | HEART RATE: 85 BPM | BODY MASS INDEX: 25.41 KG/M2

## 2023-02-15 DIAGNOSIS — J44.9 CHRONIC OBSTRUCTIVE PULMONARY DISEASE, UNSPECIFIED COPD TYPE (HCC): Primary | Chronic | ICD-10-CM

## 2023-02-15 DIAGNOSIS — F41.9 ANXIETY: ICD-10-CM

## 2023-02-15 PROBLEM — J96.01 ACUTE RESPIRATORY FAILURE WITH HYPOXIA (HCC): Status: RESOLVED | Noted: 2021-09-04 | Resolved: 2023-02-15

## 2023-02-15 NOTE — PROGRESS NOTES
Patient:  Radha Leal 71 y.o. male     02/15/23      Chiefcomplaint:   Chief Complaint   Patient presents with    COPD    Diabetes    Cough    Dental Pain     Problems and Overview     Pt presents today for evaluation prior to possible surgery for dental concerns.      We are unable to evaluate him due to incessant coughing and hyperventilating     Patient Active Problem List    Diagnosis Date Noted    Chronic pain of both hips 11/04/2020    Greater trochanteric pain syndrome of left lower extremity 10/21/2020    COPD (chronic obstructive pulmonary disease) ([facility] 06/03/2019    Prediabetes 10/05/2018    Bilateral carotid artery stenosis 08/31/2018    At high risk for injury related to fall 08/30/2018    History of traumatic head injury 08/30/2018    Post-traumatic headache 08/30/2018    Hyperlipidemia 08/30/2018    Essential (primary) hypertension 08/30/2018    GERD (gastroesophageal reflux disease) 08/30/2018    Frequency of urination 08/30/2018    Major depression, chronic 08/30/2018      Prevention:  Health Maintenance Due   Topic Date Due    COVID-19 Vaccine (1) Never done    Depression Monitoring  Never done    Colorectal Cancer Screen  Never done    Shingles vaccine (2 of 3) 08/03/2016    Diabetic retinal exam  10/09/2019    Lipids  03/05/2021    Diabetic Alb to Cr ratio (uACR) test  08/11/2021    Annual Wellness Visit (AWV)  12/08/2021    Flu vaccine (1) 08/01/2022    GFR test (Diabetes, CKD 3-4, OR last GFR 15-59)  09/07/2022    A1C test (Diabetic or Prediabetic)  12/30/2022      Meds prior:  Current Outpatient Medications   Medication Instructions    albuterol sulfate HFA (PROVENTIL;VENTOLIN;PROAIR) 108 (90 Base) MCG/ACT inhaler 2 puffs, Inhalation, EVERY 6 HOURS PRN    amLODIPine (NORVASC) 10 MG tablet TAKE 1 TABLET EVERY DAY    ARIPiprazole (ABILIFY) 5 MG tablet TAKE 1 TABLET EVERY EVENING    aspirin 81 mg, Oral, DAILY    atorvastatin (LIPITOR) 40 MG tablet TAKE 1 TABLET EVERY DAY    blood glucose monitor strips Test one times a day & as needed for symptoms of irregular blood glucose. docusate sodium (COLACE) 100 mg, Oral, 2 TIMES DAILY    famotidine (PEPCID) 20 mg, Oral, 2 TIMES DAILY    ipratropium-albuterol (DUONEB) 0.5-2.5 (3) MG/3ML SOLN nebulizer solution INHALE THE CONTENTS OF 1 VIAL VIA NEBULIZER EVERY 6 HOURS AS NEEDED FOR SHORTNESS OF BREATH    Lancets MISC Use lancet to check sugar in the AM before meals. metFORMIN (GLUCOPHAGE-XR) 500 MG extended release tablet TAKE 1 TABLET EVERY DAY WITH BREAKFAST    naproxen (NAPROSYN) 500 mg, Oral, 2 TIMES DAILY WITH MEALS    omeprazole (PRILOSEC) 20 MG delayed release capsule TAKE 1 CAPSULE TWICE DAILY    polyethylene glycol (GLYCOLAX) 17 g, Oral, DAILY    sucralfate (CARAFATE) 1 g, Oral, 2 TIMES DAILY BEFORE MEALS    SYMBICORT 80-4.5 MCG/ACT AERO INHALE 2 PUFFS INTO THE LUNGS 2 TIMES DAILY    tamsulosin (FLOMAX) 0.4 MG capsule TAKE 1 CAPSULE EVERY NIGHT    venlafaxine (EFFEXOR XR) 150 MG extended release capsule TAKE 1 CAPSULE EVERY DAY      Physical Exam   Vitals: Pulse 85   Resp 24   Ht 6' (1.829 m)   Wt 187 lb 9.6 oz (85.1 kg)   SpO2 99%   BMI 25.44 kg/m²   General Appearance: Alert, but also has episodes where he doesn't seem to know where he is. Chest wall/Lung: No ronchi/wheezing/rales despite apparent distress that comes and goes. Heart: rrr  Skin:No jaundice        Psych: unable to fully evaluate  Assessment and Plan     Pt is extremely difficult to evaluate fully. He has a long history of feeling like he can't breathe causing him to cough incessantly although his lungs sound clear and his oxygen levels are normal. We have never been able to full evaluate his lung function but he is a chronic smoker. Today he is complaining of chest pain going into this left arm, dental pain, difficulty breathing, being unable to sleep. He doesn't answer all my questions due to incessant coughing and hyperventilating.  Some of this is apparently anxiety induced and this has been the case in the past. He says he hasn't sleep more than 2 hours at a time for at least 6 months. He needs further evaluation. They adamantly decline any transportation to the emergency room due to cost issues. They are going to go home and then their neighbor will take them to the emergency department. As this has been going on for months, I will not insist that they take an ambulance. It's difficult to know what is going on but I would recommend cardiac workup and imaging and lab work to better evaluate current concerns. I'm not sure how he's going to be able to get the necessary dental work done given his current state. Chronic obstructive pulmonary disease, unspecified COPD type ([facility]  Anxiety    No follow-ups on file. Sravanthi Greenfield, DO     This document may have been prepared at least partially through the use of voice recognition software. Although effort is taken to assure the accuracy of this document, it is possible that grammatical, syntax,  or spelling errors may occur.

## 2023-02-16 ENCOUNTER — TELEPHONE (OUTPATIENT)
Dept: FAMILY MEDICINE CLINIC | Age: 73
End: 2023-02-16

## 2023-02-16 NOTE — TELEPHONE ENCOUNTER
Attempted to call pt. Mailbox full and didn't answer. They did not go to ED as advised. Would like to know how pt is doing since we were unable to evaluate him due to agitation and altered mental with hyperventilation. Would consider antipsychotic medication if this has persisted.

## 2023-05-02 ENCOUNTER — TELEPHONE (OUTPATIENT)
Dept: FAMILY MEDICINE CLINIC | Age: 73
End: 2023-05-02

## 2023-05-02 NOTE — TELEPHONE ENCOUNTER
Pt is in need of a pre op clearance appt for Oral Surgeon, I did reach out to pt, no answer and mailbox full, I will try again, [facility]

## 2023-05-08 DIAGNOSIS — R05.9 COUGH: ICD-10-CM

## 2023-05-09 RX ORDER — ALBUTEROL SULFATE 90 UG/1
2 AEROSOL, METERED RESPIRATORY (INHALATION) EVERY 6 HOURS PRN
Qty: 3 EACH | Refills: 3 | Status: SHIPPED | OUTPATIENT
Start: 2023-05-09

## 2023-07-20 DIAGNOSIS — J44.1 COPD EXACERBATION (HCC): ICD-10-CM

## 2023-07-20 DIAGNOSIS — J43.9 PULMONARY EMPHYSEMA, UNSPECIFIED EMPHYSEMA TYPE (HCC): ICD-10-CM

## 2023-07-20 RX ORDER — DILTIAZEM HYDROCHLORIDE 60 MG/1
TABLET, FILM COATED ORAL
Qty: 3 EACH | Refills: 1 | Status: SHIPPED | OUTPATIENT
Start: 2023-07-20

## 2023-09-29 DIAGNOSIS — R35.0 FREQUENCY OF URINATION: ICD-10-CM

## 2023-09-29 DIAGNOSIS — R05.9 COUGH: ICD-10-CM

## 2023-09-29 DIAGNOSIS — F31.60 MIXED BIPOLAR I DISORDER (HCC): ICD-10-CM

## 2023-09-29 DIAGNOSIS — K21.9 GASTROESOPHAGEAL REFLUX DISEASE WITHOUT ESOPHAGITIS: ICD-10-CM

## 2023-09-29 DIAGNOSIS — E78.2 MIXED HYPERLIPIDEMIA: ICD-10-CM

## 2023-09-29 DIAGNOSIS — M25.551 RIGHT HIP PAIN: ICD-10-CM

## 2023-09-29 DIAGNOSIS — I10 ESSENTIAL (PRIMARY) HYPERTENSION: ICD-10-CM

## 2023-09-29 RX ORDER — FAMOTIDINE 20 MG/1
20 TABLET, FILM COATED ORAL 2 TIMES DAILY
Qty: 60 TABLET | Refills: 0 | Status: SHIPPED | OUTPATIENT
Start: 2023-09-29

## 2023-10-02 RX ORDER — ARIPIPRAZOLE 5 MG/1
5 TABLET ORAL EVERY EVENING
Qty: 90 TABLET | Refills: 1 | OUTPATIENT
Start: 2023-10-02

## 2023-10-02 RX ORDER — NAPROXEN 500 MG/1
500 TABLET ORAL 2 TIMES DAILY WITH MEALS
Qty: 90 TABLET | Refills: 0 | OUTPATIENT
Start: 2023-10-02

## 2023-10-02 RX ORDER — OMEPRAZOLE 20 MG/1
20 CAPSULE, DELAYED RELEASE ORAL 2 TIMES DAILY
Qty: 180 CAPSULE | Refills: 1 | OUTPATIENT
Start: 2023-10-02

## 2023-10-02 RX ORDER — TAMSULOSIN HYDROCHLORIDE 0.4 MG/1
0.4 CAPSULE ORAL NIGHTLY
Qty: 90 CAPSULE | Refills: 1 | OUTPATIENT
Start: 2023-10-02

## 2023-10-02 RX ORDER — AMLODIPINE BESYLATE 10 MG/1
10 TABLET ORAL DAILY
Qty: 90 TABLET | Refills: 1 | OUTPATIENT
Start: 2023-10-02

## 2023-10-02 RX ORDER — VENLAFAXINE HYDROCHLORIDE 150 MG/1
150 CAPSULE, EXTENDED RELEASE ORAL DAILY
Qty: 90 CAPSULE | Refills: 1 | OUTPATIENT
Start: 2023-10-02

## 2023-10-02 RX ORDER — ATORVASTATIN CALCIUM 40 MG/1
40 TABLET, FILM COATED ORAL DAILY
Qty: 90 TABLET | Refills: 1 | OUTPATIENT
Start: 2023-10-02

## 2023-10-03 RX ORDER — NAPROXEN 500 MG/1
500 TABLET ORAL 2 TIMES DAILY WITH MEALS
Qty: 90 TABLET | Refills: 0 | Status: SHIPPED | OUTPATIENT
Start: 2023-10-03

## 2023-10-03 RX ORDER — TAMSULOSIN HYDROCHLORIDE 0.4 MG/1
0.4 CAPSULE ORAL NIGHTLY
Qty: 90 CAPSULE | Refills: 1 | Status: SHIPPED | OUTPATIENT
Start: 2023-10-03

## 2023-10-03 RX ORDER — ARIPIPRAZOLE 5 MG/1
5 TABLET ORAL EVERY EVENING
Qty: 90 TABLET | Refills: 1 | Status: SHIPPED
Start: 2023-10-03 | End: 2023-10-05

## 2023-10-03 RX ORDER — VENLAFAXINE HYDROCHLORIDE 150 MG/1
150 CAPSULE, EXTENDED RELEASE ORAL DAILY
Qty: 90 CAPSULE | Refills: 1 | Status: SHIPPED | OUTPATIENT
Start: 2023-10-03

## 2023-10-03 RX ORDER — OMEPRAZOLE 20 MG/1
20 CAPSULE, DELAYED RELEASE ORAL 2 TIMES DAILY
Qty: 180 CAPSULE | Refills: 1 | Status: SHIPPED | OUTPATIENT
Start: 2023-10-03

## 2023-10-03 RX ORDER — ALBUTEROL SULFATE 90 UG/1
2 AEROSOL, METERED RESPIRATORY (INHALATION) EVERY 6 HOURS PRN
Qty: 3 EACH | Refills: 3 | Status: SHIPPED | OUTPATIENT
Start: 2023-10-03

## 2023-10-03 RX ORDER — ATORVASTATIN CALCIUM 40 MG/1
40 TABLET, FILM COATED ORAL DAILY
Qty: 90 TABLET | Refills: 1 | Status: SHIPPED | OUTPATIENT
Start: 2023-10-03

## 2023-10-03 RX ORDER — AMLODIPINE BESYLATE 10 MG/1
10 TABLET ORAL DAILY
Qty: 90 TABLET | Refills: 1 | Status: SHIPPED | OUTPATIENT
Start: 2023-10-03

## 2023-10-05 ENCOUNTER — OFFICE VISIT (OUTPATIENT)
Dept: FAMILY MEDICINE CLINIC | Age: 73
End: 2023-10-05
Payer: MEDICARE

## 2023-10-05 VITALS
BODY MASS INDEX: 24.08 KG/M2 | TEMPERATURE: 97.1 F | RESPIRATION RATE: 18 BRPM | WEIGHT: 177.8 LBS | DIASTOLIC BLOOD PRESSURE: 79 MMHG | OXYGEN SATURATION: 95 % | HEIGHT: 72 IN | HEART RATE: 61 BPM | SYSTOLIC BLOOD PRESSURE: 139 MMHG

## 2023-10-05 DIAGNOSIS — F41.0 SEVERE ANXIETY WITH PANIC: ICD-10-CM

## 2023-10-05 DIAGNOSIS — F33.1 MODERATE EPISODE OF RECURRENT MAJOR DEPRESSIVE DISORDER (HCC): ICD-10-CM

## 2023-10-05 DIAGNOSIS — J44.9 CHRONIC OBSTRUCTIVE PULMONARY DISEASE, UNSPECIFIED COPD TYPE (HCC): Primary | Chronic | ICD-10-CM

## 2023-10-05 DIAGNOSIS — E11.9 TYPE 2 DIABETES MELLITUS WITHOUT COMPLICATION, WITHOUT LONG-TERM CURRENT USE OF INSULIN (HCC): ICD-10-CM

## 2023-10-05 DIAGNOSIS — F51.04 PSYCHOPHYSIOLOGICAL INSOMNIA: ICD-10-CM

## 2023-10-05 DIAGNOSIS — Z23 NEED FOR IMMUNIZATION AGAINST INFLUENZA: ICD-10-CM

## 2023-10-05 PROBLEM — F33.0 MAJOR DEPRESSIVE DISORDER, RECURRENT, MILD (HCC): Status: ACTIVE | Noted: 2023-10-05

## 2023-10-05 PROBLEM — F33.9 MAJOR DEPRESSIVE DISORDER, RECURRENT, UNSPECIFIED (HCC): Status: ACTIVE | Noted: 2023-10-05

## 2023-10-05 PROBLEM — I70.0 AORTIC ATHEROSCLEROSIS (HCC): Status: ACTIVE | Noted: 2023-10-05

## 2023-10-05 LAB — HBA1C MFR BLD: 6.1 %

## 2023-10-05 PROCEDURE — 1036F TOBACCO NON-USER: CPT | Performed by: STUDENT IN AN ORGANIZED HEALTH CARE EDUCATION/TRAINING PROGRAM

## 2023-10-05 PROCEDURE — G8420 CALC BMI NORM PARAMETERS: HCPCS | Performed by: STUDENT IN AN ORGANIZED HEALTH CARE EDUCATION/TRAINING PROGRAM

## 2023-10-05 PROCEDURE — 3017F COLORECTAL CA SCREEN DOC REV: CPT | Performed by: STUDENT IN AN ORGANIZED HEALTH CARE EDUCATION/TRAINING PROGRAM

## 2023-10-05 PROCEDURE — G8484 FLU IMMUNIZE NO ADMIN: HCPCS | Performed by: STUDENT IN AN ORGANIZED HEALTH CARE EDUCATION/TRAINING PROGRAM

## 2023-10-05 PROCEDURE — 83036 HEMOGLOBIN GLYCOSYLATED A1C: CPT | Performed by: STUDENT IN AN ORGANIZED HEALTH CARE EDUCATION/TRAINING PROGRAM

## 2023-10-05 PROCEDURE — 3044F HG A1C LEVEL LT 7.0%: CPT | Performed by: STUDENT IN AN ORGANIZED HEALTH CARE EDUCATION/TRAINING PROGRAM

## 2023-10-05 PROCEDURE — 3075F SYST BP GE 130 - 139MM HG: CPT | Performed by: STUDENT IN AN ORGANIZED HEALTH CARE EDUCATION/TRAINING PROGRAM

## 2023-10-05 PROCEDURE — 99214 OFFICE O/P EST MOD 30 MIN: CPT | Performed by: STUDENT IN AN ORGANIZED HEALTH CARE EDUCATION/TRAINING PROGRAM

## 2023-10-05 PROCEDURE — 1123F ACP DISCUSS/DSCN MKR DOCD: CPT | Performed by: STUDENT IN AN ORGANIZED HEALTH CARE EDUCATION/TRAINING PROGRAM

## 2023-10-05 PROCEDURE — G8427 DOCREV CUR MEDS BY ELIG CLIN: HCPCS | Performed by: STUDENT IN AN ORGANIZED HEALTH CARE EDUCATION/TRAINING PROGRAM

## 2023-10-05 PROCEDURE — 2022F DILAT RTA XM EVC RTNOPTHY: CPT | Performed by: STUDENT IN AN ORGANIZED HEALTH CARE EDUCATION/TRAINING PROGRAM

## 2023-10-05 PROCEDURE — 3023F SPIROM DOC REV: CPT | Performed by: STUDENT IN AN ORGANIZED HEALTH CARE EDUCATION/TRAINING PROGRAM

## 2023-10-05 PROCEDURE — 90694 VACC AIIV4 NO PRSRV 0.5ML IM: CPT | Performed by: STUDENT IN AN ORGANIZED HEALTH CARE EDUCATION/TRAINING PROGRAM

## 2023-10-05 PROCEDURE — G0008 ADMIN INFLUENZA VIRUS VAC: HCPCS | Performed by: STUDENT IN AN ORGANIZED HEALTH CARE EDUCATION/TRAINING PROGRAM

## 2023-10-05 PROCEDURE — 3078F DIAST BP <80 MM HG: CPT | Performed by: STUDENT IN AN ORGANIZED HEALTH CARE EDUCATION/TRAINING PROGRAM

## 2023-10-05 RX ORDER — TEMAZEPAM 15 MG/1
15 CAPSULE ORAL NIGHTLY PRN
Qty: 30 CAPSULE | Refills: 0 | Status: SHIPPED | OUTPATIENT
Start: 2023-10-05 | End: 2023-11-04

## 2023-10-05 RX ORDER — CLONAZEPAM 0.5 MG/1
0.5 TABLET ORAL NIGHTLY PRN
Qty: 30 TABLET | Refills: 0 | Status: CANCELLED | OUTPATIENT
Start: 2023-10-05 | End: 2023-11-04

## 2023-10-05 RX ORDER — ARIPIPRAZOLE 10 MG/1
10 TABLET ORAL EVERY EVENING
Qty: 90 TABLET | Refills: 1 | Status: SHIPPED | OUTPATIENT
Start: 2023-10-05 | End: 2024-04-02

## 2023-10-05 SDOH — ECONOMIC STABILITY: FOOD INSECURITY: WITHIN THE PAST 12 MONTHS, THE FOOD YOU BOUGHT JUST DIDN'T LAST AND YOU DIDN'T HAVE MONEY TO GET MORE.: NEVER TRUE

## 2023-10-05 SDOH — ECONOMIC STABILITY: HOUSING INSECURITY
IN THE LAST 12 MONTHS, WAS THERE A TIME WHEN YOU DID NOT HAVE A STEADY PLACE TO SLEEP OR SLEPT IN A SHELTER (INCLUDING NOW)?: NO

## 2023-10-05 SDOH — ECONOMIC STABILITY: FOOD INSECURITY: WITHIN THE PAST 12 MONTHS, YOU WORRIED THAT YOUR FOOD WOULD RUN OUT BEFORE YOU GOT MONEY TO BUY MORE.: NEVER TRUE

## 2023-10-05 SDOH — ECONOMIC STABILITY: INCOME INSECURITY: HOW HARD IS IT FOR YOU TO PAY FOR THE VERY BASICS LIKE FOOD, HOUSING, MEDICAL CARE, AND HEATING?: NOT HARD AT ALL

## 2023-10-05 ASSESSMENT — PATIENT HEALTH QUESTIONNAIRE - PHQ9
8. MOVING OR SPEAKING SO SLOWLY THAT OTHER PEOPLE COULD HAVE NOTICED. OR THE OPPOSITE, BEING SO FIGETY OR RESTLESS THAT YOU HAVE BEEN MOVING AROUND A LOT MORE THAN USUAL: 0
9. THOUGHTS THAT YOU WOULD BE BETTER OFF DEAD, OR OF HURTING YOURSELF: 0
SUM OF ALL RESPONSES TO PHQ9 QUESTIONS 1 & 2: 3
10. IF YOU CHECKED OFF ANY PROBLEMS, HOW DIFFICULT HAVE THESE PROBLEMS MADE IT FOR YOU TO DO YOUR WORK, TAKE CARE OF THINGS AT HOME, OR GET ALONG WITH OTHER PEOPLE: 0
6. FEELING BAD ABOUT YOURSELF - OR THAT YOU ARE A FAILURE OR HAVE LET YOURSELF OR YOUR FAMILY DOWN: 0
2. FEELING DOWN, DEPRESSED OR HOPELESS: 1
5. POOR APPETITE OR OVEREATING: 2
SUM OF ALL RESPONSES TO PHQ QUESTIONS 1-9: 9
SUM OF ALL RESPONSES TO PHQ QUESTIONS 1-9: 9
7. TROUBLE CONCENTRATING ON THINGS, SUCH AS READING THE NEWSPAPER OR WATCHING TELEVISION: 0
4. FEELING TIRED OR HAVING LITTLE ENERGY: 2
1. LITTLE INTEREST OR PLEASURE IN DOING THINGS: 2
3. TROUBLE FALLING OR STAYING ASLEEP: 2
SUM OF ALL RESPONSES TO PHQ QUESTIONS 1-9: 9
SUM OF ALL RESPONSES TO PHQ QUESTIONS 1-9: 9

## 2023-10-05 NOTE — PROGRESS NOTES
Patient:  Mary Lang 68 y.o. male     10/05/23      Chiefcomplaint:   Chief Complaint   Patient presents with    Other     Needs a release to go to dentist -- due to COPD-- needs 6-7 teeth pulled     Problems and Overview     Copd - symbicort and albuterol - always wheezing. Dentist wouldn't perform tooth pulling until better control. Has not seen pulm. Anxiety, depression, insomnia - currently on effexor and abilify 5mg. Worse sx since grandson passed away this summer. Uncontrolled anxiety and panic and little to not motirvation and disrupted sleep and abnormal appetite.  Barely getting a couple hours a night      Patient Active Problem List    Diagnosis Date Noted    Aortic atherosclerosis ([address]) 10/05/2023    Type 2 diabetes mellitus without complication, without long-term current use of insulin ([address]) 10/05/2023    Major depressive disorder, recurrent, mild 10/05/2023    Major depressive disorder, recurrent, moderate 10/05/2023    Major depressive disorder, recurrent, unspecified 10/05/2023    Chronic pain of both hips 11/04/2020    Greater trochanteric pain syndrome of left lower extremity 10/21/2020    COPD (chronic obstructive pulmonary disease) ([address]) 06/03/2019    Prediabetes 10/05/2018    Bilateral carotid artery stenosis 08/31/2018    At high risk for injury related to fall 08/30/2018    History of traumatic head injury 08/30/2018    Post-traumatic headache 08/30/2018    Hyperlipidemia 08/30/2018    Essential (primary) hypertension 08/30/2018    GERD (gastroesophageal reflux disease) 08/30/2018    Frequency of urination 08/30/2018    Major depression, chronic 08/30/2018      Prevention:  Health Maintenance Due   Topic Date Due    COVID-19 Vaccine (1) Never done    Depression Monitoring  Never done    Colorectal Cancer Screen  Never done    Shingles vaccine (2 of 3) 08/03/2016    Diabetic retinal exam  10/09/2019    Diabetic foot exam  03/05/2021    Lipids  03/05/2021    Diabetic Alb to Cr ratio (uACR)

## 2023-10-31 ENCOUNTER — OFFICE VISIT (OUTPATIENT)
Dept: PULMONOLOGY | Age: 73
End: 2023-10-31
Payer: MEDICARE

## 2023-10-31 VITALS
DIASTOLIC BLOOD PRESSURE: 84 MMHG | BODY MASS INDEX: 24.79 KG/M2 | HEIGHT: 72 IN | HEART RATE: 71 BPM | WEIGHT: 183 LBS | OXYGEN SATURATION: 96 % | SYSTOLIC BLOOD PRESSURE: 125 MMHG | TEMPERATURE: 97.7 F

## 2023-10-31 DIAGNOSIS — R91.8 MULTIPLE LUNG NODULES: ICD-10-CM

## 2023-10-31 DIAGNOSIS — J44.1 COPD WITH EXACERBATION (HCC): Primary | ICD-10-CM

## 2023-10-31 PROCEDURE — 3079F DIAST BP 80-89 MM HG: CPT | Performed by: INTERNAL MEDICINE

## 2023-10-31 PROCEDURE — G8484 FLU IMMUNIZE NO ADMIN: HCPCS | Performed by: INTERNAL MEDICINE

## 2023-10-31 PROCEDURE — 99204 OFFICE O/P NEW MOD 45 MIN: CPT | Performed by: INTERNAL MEDICINE

## 2023-10-31 PROCEDURE — 3074F SYST BP LT 130 MM HG: CPT | Performed by: INTERNAL MEDICINE

## 2023-10-31 PROCEDURE — G8420 CALC BMI NORM PARAMETERS: HCPCS | Performed by: INTERNAL MEDICINE

## 2023-10-31 PROCEDURE — 3023F SPIROM DOC REV: CPT | Performed by: INTERNAL MEDICINE

## 2023-10-31 PROCEDURE — G8428 CUR MEDS NOT DOCUMENT: HCPCS | Performed by: INTERNAL MEDICINE

## 2023-10-31 RX ORDER — PREDNISONE 20 MG/1
20 TABLET ORAL 2 TIMES DAILY
Qty: 10 TABLET | Refills: 0 | Status: SHIPPED | OUTPATIENT
Start: 2023-10-31 | End: 2023-11-05

## 2023-10-31 RX ORDER — AZITHROMYCIN 250 MG/1
250 TABLET, FILM COATED ORAL DAILY
Qty: 10 TABLET | Refills: 0 | Status: SHIPPED | OUTPATIENT
Start: 2023-10-31 | End: 2023-11-10

## 2023-10-31 ASSESSMENT — ENCOUNTER SYMPTOMS
WHEEZING: 1
EYES NEGATIVE: 1
SHORTNESS OF BREATH: 1
ALLERGIC/IMMUNOLOGIC NEGATIVE: 1
COUGH: 1
GASTROINTESTINAL NEGATIVE: 1
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
## 2023-10-31 NOTE — PROGRESS NOTES
Patient to follow up with physician in one month. Chest CT will be scheduled with [facility]. Chest x-ray was ordered, patient took order with him to have done today. Over[facility] P[facility]. Sample of Georgeana Essex was given to the patient with instructions.

## 2023-11-07 ENCOUNTER — OFFICE VISIT (OUTPATIENT)
Dept: FAMILY MEDICINE CLINIC | Age: 73
End: 2023-11-07
Payer: MEDICARE

## 2023-11-07 VITALS
TEMPERATURE: 97.8 F | SYSTOLIC BLOOD PRESSURE: 104 MMHG | RESPIRATION RATE: 18 BRPM | HEART RATE: 70 BPM | OXYGEN SATURATION: 98 % | HEIGHT: 72 IN | DIASTOLIC BLOOD PRESSURE: 62 MMHG | WEIGHT: 185.6 LBS | BODY MASS INDEX: 25.14 KG/M2

## 2023-11-07 DIAGNOSIS — I70.0 AORTIC ATHEROSCLEROSIS (HCC): ICD-10-CM

## 2023-11-07 DIAGNOSIS — F51.04 PSYCHOPHYSIOLOGICAL INSOMNIA: ICD-10-CM

## 2023-11-07 DIAGNOSIS — J44.1 COPD EXACERBATION (HCC): ICD-10-CM

## 2023-11-07 DIAGNOSIS — Z00.00 MEDICARE ANNUAL WELLNESS VISIT, SUBSEQUENT: Primary | ICD-10-CM

## 2023-11-07 PROCEDURE — 1036F TOBACCO NON-USER: CPT | Performed by: STUDENT IN AN ORGANIZED HEALTH CARE EDUCATION/TRAINING PROGRAM

## 2023-11-07 PROCEDURE — G8417 CALC BMI ABV UP PARAM F/U: HCPCS | Performed by: STUDENT IN AN ORGANIZED HEALTH CARE EDUCATION/TRAINING PROGRAM

## 2023-11-07 PROCEDURE — G8427 DOCREV CUR MEDS BY ELIG CLIN: HCPCS | Performed by: STUDENT IN AN ORGANIZED HEALTH CARE EDUCATION/TRAINING PROGRAM

## 2023-11-07 PROCEDURE — 3074F SYST BP LT 130 MM HG: CPT | Performed by: STUDENT IN AN ORGANIZED HEALTH CARE EDUCATION/TRAINING PROGRAM

## 2023-11-07 PROCEDURE — G8484 FLU IMMUNIZE NO ADMIN: HCPCS | Performed by: STUDENT IN AN ORGANIZED HEALTH CARE EDUCATION/TRAINING PROGRAM

## 2023-11-07 PROCEDURE — 3017F COLORECTAL CA SCREEN DOC REV: CPT | Performed by: STUDENT IN AN ORGANIZED HEALTH CARE EDUCATION/TRAINING PROGRAM

## 2023-11-07 PROCEDURE — 99214 OFFICE O/P EST MOD 30 MIN: CPT | Performed by: STUDENT IN AN ORGANIZED HEALTH CARE EDUCATION/TRAINING PROGRAM

## 2023-11-07 PROCEDURE — G0439 PPPS, SUBSEQ VISIT: HCPCS | Performed by: STUDENT IN AN ORGANIZED HEALTH CARE EDUCATION/TRAINING PROGRAM

## 2023-11-07 PROCEDURE — 3023F SPIROM DOC REV: CPT | Performed by: STUDENT IN AN ORGANIZED HEALTH CARE EDUCATION/TRAINING PROGRAM

## 2023-11-07 PROCEDURE — 3078F DIAST BP <80 MM HG: CPT | Performed by: STUDENT IN AN ORGANIZED HEALTH CARE EDUCATION/TRAINING PROGRAM

## 2023-11-07 PROCEDURE — 1123F ACP DISCUSS/DSCN MKR DOCD: CPT | Performed by: STUDENT IN AN ORGANIZED HEALTH CARE EDUCATION/TRAINING PROGRAM

## 2023-11-07 RX ORDER — TRAZODONE HYDROCHLORIDE 100 MG/1
100 TABLET ORAL NIGHTLY
Qty: 30 TABLET | Refills: 0 | Status: SHIPPED | OUTPATIENT
Start: 2023-11-07 | End: 2023-12-07

## 2023-11-07 RX ORDER — BUDESONIDE, GLYCOPYRROLATE, AND FORMOTEROL FUMARATE 160; 9; 4.8 UG/1; UG/1; UG/1
1 AEROSOL, METERED RESPIRATORY (INHALATION) 2 TIMES DAILY
Qty: 10.7 G | Refills: 1 | Status: SHIPPED | OUTPATIENT
Start: 2023-11-07

## 2023-11-07 RX ORDER — BUDESONIDE, GLYCOPYRROLATE, AND FORMOTEROL FUMARATE 160; 9; 4.8 UG/1; UG/1; UG/1
AEROSOL, METERED RESPIRATORY (INHALATION) 2 TIMES DAILY
COMMUNITY
End: 2023-11-07 | Stop reason: SDUPTHER

## 2023-11-07 ASSESSMENT — LIFESTYLE VARIABLES
HOW MANY STANDARD DRINKS CONTAINING ALCOHOL DO YOU HAVE ON A TYPICAL DAY: PATIENT DOES NOT DRINK
HOW OFTEN DO YOU HAVE A DRINK CONTAINING ALCOHOL: NEVER

## 2023-11-07 ASSESSMENT — PATIENT HEALTH QUESTIONNAIRE - PHQ9
SUM OF ALL RESPONSES TO PHQ QUESTIONS 1-9: 16
5. POOR APPETITE OR OVEREATING: 3
SUM OF ALL RESPONSES TO PHQ9 QUESTIONS 1 & 2: 6
SUM OF ALL RESPONSES TO PHQ QUESTIONS 1-9: 16
9. THOUGHTS THAT YOU WOULD BE BETTER OFF DEAD, OR OF HURTING YOURSELF: 0
1. LITTLE INTEREST OR PLEASURE IN DOING THINGS: 3
8. MOVING OR SPEAKING SO SLOWLY THAT OTHER PEOPLE COULD HAVE NOTICED. OR THE OPPOSITE, BEING SO FIGETY OR RESTLESS THAT YOU HAVE BEEN MOVING AROUND A LOT MORE THAN USUAL: 1
2. FEELING DOWN, DEPRESSED OR HOPELESS: 3
4. FEELING TIRED OR HAVING LITTLE ENERGY: 3
7. TROUBLE CONCENTRATING ON THINGS, SUCH AS READING THE NEWSPAPER OR WATCHING TELEVISION: 0
3. TROUBLE FALLING OR STAYING ASLEEP: 3
6. FEELING BAD ABOUT YOURSELF - OR THAT YOU ARE A FAILURE OR HAVE LET YOURSELF OR YOUR FAMILY DOWN: 0
SUM OF ALL RESPONSES TO PHQ QUESTIONS 1-9: 16
10. IF YOU CHECKED OFF ANY PROBLEMS, HOW DIFFICULT HAVE THESE PROBLEMS MADE IT FOR YOU TO DO YOUR WORK, TAKE CARE OF THINGS AT HOME, OR GET ALONG WITH OTHER PEOPLE: 3
SUM OF ALL RESPONSES TO PHQ QUESTIONS 1-9: 16

## 2023-11-07 ASSESSMENT — COLUMBIA-SUICIDE SEVERITY RATING SCALE - C-SSRS
4. HAVE YOU HAD THESE THOUGHTS AND HAD SOME INTENTION OF ACTING ON THEM?: NO
5. HAVE YOU STARTED TO WORK OUT OR WORKED OUT THE DETAILS OF HOW TO KILL YOURSELF? DO YOU INTEND TO CARRY OUT THIS PLAN?: NO
3. HAVE YOU BEEN THINKING ABOUT HOW YOU MIGHT KILL YOURSELF?: NO
7. DID THIS OCCUR IN THE LAST THREE MONTHS: NO

## 2023-11-07 NOTE — PROGRESS NOTES
bedtime Yes Jeralyn Blizzard, DO   azithromycin (ZITHROMAX Z-ROLAND) 250 MG tablet Take 1 tablet by mouth daily for 10 days Yes Letty Springer MD   ARIPiprazole (ABILIFY) 10 MG tablet Take 1 tablet by mouth every evening Yes Jeralyn Blizzard, DO   omeprazole (PRILOSEC) 20 MG delayed release capsule Take 1 capsule by mouth 2 times daily Yes Jeralyn Blizzard, DO   amLODIPine (NORVASC) 10 MG tablet Take 1 tablet by mouth daily Yes Jeralyn Blizzard, DO   tamsulosin (FLOMAX) 0.4 MG capsule Take 1 capsule by mouth nightly Yes Jeralyn Blizzard, DO   venlafaxine (EFFEXOR XR) 150 MG extended release capsule Take 1 capsule by mouth daily Yes Jeralyn Blizzard, DO   atorvastatin (LIPITOR) 40 MG tablet Take 1 tablet by mouth daily Yes Jeralyn Blizzard, DO   naproxen (NAPROSYN) 500 MG tablet Take 1 tablet by mouth 2 times daily (with meals) Yes Jeralyn Blizzard, DO   albuterol sulfate HFA (PROVENTIL;VENTOLIN;PROAIR) 108 (90 Base) MCG/ACT inhaler Inhale 2 puffs into the lungs every 6 hours as needed for Wheezing Yes Jeralyn Blizzard, DO   famotidine (PEPCID) 20 MG tablet Take 1 tablet by mouth 2 times daily Yes Zhang Fitzpatrick DO   ipratropium-albuterol (DUONEB) 0.5-2.5 (3) MG/3ML SOLN nebulizer solution INHALE THE CONTENTS OF 1 VIAL VIA NEBULIZER EVERY 6 HOURS AS NEEDED FOR SHORTNESS OF BREATH Yes Monie Yap MD   docusate sodium (COLACE) 100 MG capsule Take 1 capsule by mouth 2 times daily Yes Terrance Duarte MD   blood glucose monitor strips Test one times a day & as needed for symptoms of irregular blood glucose. Yes Jeralyn Blizzard, DO   Lancets MISC Use lancet to check sugar in the AM before meals.  Yes Jeralyn Blizzard, DO   polyethylene glycol (GLYCOLAX) powder Take 17 g by mouth daily Yes Dion Parisi MD   aspirin 81 MG tablet Take 1 tablet by mouth daily Yes Dion Parisi MD CareTeam (Including outside providers/suppliers regularly involved in providing care):   Patient Care

## 2023-11-07 NOTE — PATIENT INSTRUCTIONS
[employer]. Call 8-570.722.2638 or search [employer] online. You need 5405-6100 mg of calcium and 1096-0780 IU of vitamin D per day. It is possible to meet your calcium requirement with diet alone, but a vitamin D supplement is usually necessary to meet this goal.  When exposed to the sun, use a sunscreen that protects against both UVA and UVB radiation with an SPF of 30 or greater. Reapply every 2 to 3 hours or after sweating, drying off with a towel, or swimming. Always wear a seat belt when traveling in a car. Always wear a helmet when riding a bicycle or motorcycle.

## 2024-02-26 DIAGNOSIS — F31.60 MIXED BIPOLAR I DISORDER (HCC): ICD-10-CM

## 2024-02-26 DIAGNOSIS — E78.2 MIXED HYPERLIPIDEMIA: ICD-10-CM

## 2024-02-26 DIAGNOSIS — R35.0 FREQUENCY OF URINATION: ICD-10-CM

## 2024-02-26 DIAGNOSIS — I10 ESSENTIAL (PRIMARY) HYPERTENSION: ICD-10-CM

## 2024-02-26 DIAGNOSIS — K21.9 GASTROESOPHAGEAL REFLUX DISEASE WITHOUT ESOPHAGITIS: ICD-10-CM

## 2024-02-27 RX ORDER — VENLAFAXINE HYDROCHLORIDE 150 MG/1
150 CAPSULE, EXTENDED RELEASE ORAL DAILY
Qty: 90 CAPSULE | Refills: 3 | Status: SHIPPED | OUTPATIENT
Start: 2024-02-27

## 2024-02-27 RX ORDER — ATORVASTATIN CALCIUM 40 MG/1
40 TABLET, FILM COATED ORAL DAILY
Qty: 90 TABLET | Refills: 3 | Status: SHIPPED | OUTPATIENT
Start: 2024-02-27

## 2024-02-27 RX ORDER — ARIPIPRAZOLE 10 MG/1
10 TABLET ORAL EVERY EVENING
Qty: 90 TABLET | Refills: 3 | Status: SHIPPED | OUTPATIENT
Start: 2024-02-27

## 2024-02-27 RX ORDER — OMEPRAZOLE 20 MG/1
20 CAPSULE, DELAYED RELEASE ORAL 2 TIMES DAILY
Qty: 180 CAPSULE | Refills: 3 | Status: SHIPPED | OUTPATIENT
Start: 2024-02-27

## 2024-02-27 RX ORDER — TAMSULOSIN HYDROCHLORIDE 0.4 MG/1
0.4 CAPSULE ORAL NIGHTLY
Qty: 90 CAPSULE | Refills: 3 | Status: SHIPPED | OUTPATIENT
Start: 2024-02-27

## 2024-02-27 RX ORDER — AMLODIPINE BESYLATE 10 MG/1
10 TABLET ORAL DAILY
Qty: 90 TABLET | Refills: 3 | Status: SHIPPED | OUTPATIENT
Start: 2024-02-27

## 2024-10-17 ENCOUNTER — OFFICE VISIT (OUTPATIENT)
Dept: FAMILY MEDICINE CLINIC | Age: 74
End: 2024-10-17

## 2024-10-17 VITALS
WEIGHT: 185 LBS | DIASTOLIC BLOOD PRESSURE: 72 MMHG | BODY MASS INDEX: 25.06 KG/M2 | TEMPERATURE: 97 F | RESPIRATION RATE: 16 BRPM | HEIGHT: 72 IN | SYSTOLIC BLOOD PRESSURE: 114 MMHG | HEART RATE: 71 BPM | OXYGEN SATURATION: 95 %

## 2024-10-17 DIAGNOSIS — M25.552 CHRONIC PAIN OF BOTH HIPS: ICD-10-CM

## 2024-10-17 DIAGNOSIS — Z00.00 MEDICARE ANNUAL WELLNESS VISIT, SUBSEQUENT: Primary | ICD-10-CM

## 2024-10-17 DIAGNOSIS — R29.6 RECURRENT FALLS WHILE WALKING: ICD-10-CM

## 2024-10-17 DIAGNOSIS — F33.1 MODERATE EPISODE OF RECURRENT MAJOR DEPRESSIVE DISORDER (HCC): ICD-10-CM

## 2024-10-17 DIAGNOSIS — M25.551 CHRONIC PAIN OF BOTH HIPS: ICD-10-CM

## 2024-10-17 DIAGNOSIS — Z23 NEED FOR IMMUNIZATION AGAINST INFLUENZA: ICD-10-CM

## 2024-10-17 DIAGNOSIS — E11.9 TYPE 2 DIABETES MELLITUS WITHOUT COMPLICATION, WITHOUT LONG-TERM CURRENT USE OF INSULIN (HCC): ICD-10-CM

## 2024-10-17 DIAGNOSIS — G89.29 CHRONIC PAIN OF BOTH HIPS: ICD-10-CM

## 2024-10-17 DIAGNOSIS — I70.0 AORTIC ATHEROSCLEROSIS (HCC): ICD-10-CM

## 2024-10-17 DIAGNOSIS — Z91.81 AT HIGH RISK FOR INJURY RELATED TO FALL: ICD-10-CM

## 2024-10-17 PROBLEM — J44.9 COPD (CHRONIC OBSTRUCTIVE PULMONARY DISEASE) (HCC): Chronic | Status: RESOLVED | Noted: 2019-06-03 | Resolved: 2024-10-17

## 2024-10-17 LAB
CREATININE URINE POCT: 300
HBA1C MFR BLD: 6.4 %
MICROALBUMIN/CREAT 24H UR: 30 MG/DL
MICROALBUMIN/CREAT UR-RTO: <30 MG/G

## 2024-10-17 ASSESSMENT — LIFESTYLE VARIABLES
HOW OFTEN DO YOU HAVE A DRINK CONTAINING ALCOHOL: NEVER
HOW MANY STANDARD DRINKS CONTAINING ALCOHOL DO YOU HAVE ON A TYPICAL DAY: PATIENT DOES NOT DRINK

## 2024-10-17 NOTE — PATIENT INSTRUCTIONS
shakiness, and anxiety.   Watch closely for changes in your health, and be sure to contact your doctor if:    You have a relapse.     You need more help or support to stop.   Where can you learn more?  Go to https://www.[employer].net/patientEd and enter H573 to learn more about \"Substance Use Disorder: Care Instructions.\"  Current as of: November 15, 2023  Content Version: 14.2  © 2024 [email or web address].   Care instructions adapted under license by [employer]. If you have questions about a medical condition or this instruction, always ask your healthcare professional. [employer] disclaims any warranty or liability for your use of this information.           Learning About Emotional Support  When do you need emotional support?     You might find getting support from others helpful when you have a long-term health problem. Often people feel alone, confused, or scared when coping with an illness. But you aren't alone. Other people are going through the same thing you are and know how you feel.  Talking with others about your feelings can help you feel better.  Your family and friends can give you support. So can your doctor, a support group, or a [employer]. If you have a support network, you will not feel as alone. You will learn new ways to deal with your situation, and you may try harder to overcome it.  Where you can get support  Family and friends: They can help you cope by giving you comfort and encouragement.  Counseling: Professional counseling can help you cope with situations that interfere with your life and cause stress. Counseling can help you understand and deal with your illness.  Your doctor: Find a doctor you trust and feel comfortable with. Be open and honest about your fears and concerns. Your doctor can help you get the right medical treatments, including counseling.  Spiritual or Nondenominational groups: They can provide comfort and may be able to help you find counseling or other social

## 2024-10-18 ENCOUNTER — TELEPHONE (OUTPATIENT)
Dept: FAMILY MEDICINE CLINIC | Age: 74
End: 2024-10-18

## 2024-12-24 NOTE — PROGRESS NOTES
Called patient and explained that liver provider needs to fill prescription for furosemide.    Julissa NAIR CMA   1 tablet by mouth daily Yes Provider, MD Dion   traZODone (DESYREL) 100 MG tablet Take 1 tablet by mouth nightly  Zhang Fitzpatrick DO       CareTeam (Including outside providers/suppliers regularly involved in providing care):   Patient Care Team:  Zhang Fitzpatrick DO as PCP - General (Family Medicine)  Zhang Fitzpatrick DO as PCP - Empaneled Provider      Reviewed and updated this visit:  Tobacco  Allergies  Med Hx  Surg Hx  Soc Hx  Fam Hx

## 2025-03-14 DIAGNOSIS — K21.9 GASTROESOPHAGEAL REFLUX DISEASE WITHOUT ESOPHAGITIS: ICD-10-CM

## 2025-03-14 DIAGNOSIS — R05.9 COUGH: ICD-10-CM

## 2025-03-14 DIAGNOSIS — J44.1 COPD EXACERBATION (HCC): ICD-10-CM

## 2025-03-14 DIAGNOSIS — I10 ESSENTIAL (PRIMARY) HYPERTENSION: ICD-10-CM

## 2025-03-14 DIAGNOSIS — F31.60 MIXED BIPOLAR I DISORDER (HCC): ICD-10-CM

## 2025-03-14 DIAGNOSIS — E78.2 MIXED HYPERLIPIDEMIA: ICD-10-CM

## 2025-03-14 DIAGNOSIS — R35.0 FREQUENCY OF URINATION: ICD-10-CM

## 2025-03-14 RX ORDER — VENLAFAXINE HYDROCHLORIDE 150 MG/1
150 CAPSULE, EXTENDED RELEASE ORAL DAILY
Qty: 90 CAPSULE | Refills: 1 | Status: SHIPPED | OUTPATIENT
Start: 2025-03-14

## 2025-03-14 RX ORDER — ALBUTEROL SULFATE 90 UG/1
2 INHALANT RESPIRATORY (INHALATION) EVERY 6 HOURS PRN
Qty: 3 EACH | Refills: 1 | Status: SHIPPED | OUTPATIENT
Start: 2025-03-14

## 2025-03-14 RX ORDER — TAMSULOSIN HYDROCHLORIDE 0.4 MG/1
0.4 CAPSULE ORAL NIGHTLY
Qty: 90 CAPSULE | Refills: 1 | Status: SHIPPED | OUTPATIENT
Start: 2025-03-14

## 2025-03-14 RX ORDER — ATORVASTATIN CALCIUM 40 MG/1
40 TABLET, FILM COATED ORAL DAILY
Qty: 90 TABLET | Refills: 1 | Status: SHIPPED | OUTPATIENT
Start: 2025-03-14

## 2025-03-14 RX ORDER — AMLODIPINE BESYLATE 10 MG/1
10 TABLET ORAL DAILY
Qty: 90 TABLET | Refills: 1 | Status: SHIPPED | OUTPATIENT
Start: 2025-03-14

## 2025-03-14 RX ORDER — ARIPIPRAZOLE 10 MG/1
10 TABLET ORAL EVERY EVENING
Qty: 90 TABLET | Refills: 1 | Status: SHIPPED | OUTPATIENT
Start: 2025-03-14

## 2025-03-14 RX ORDER — OMEPRAZOLE 20 MG/1
20 CAPSULE, DELAYED RELEASE ORAL 2 TIMES DAILY
Qty: 180 CAPSULE | Refills: 1 | Status: SHIPPED | OUTPATIENT
Start: 2025-03-14

## 2025-03-14 RX ORDER — BUDESONIDE, GLYCOPYRROLATE, AND FORMOTEROL FUMARATE 160; 9; 4.8 UG/1; UG/1; UG/1
1 AEROSOL, METERED RESPIRATORY (INHALATION) 2 TIMES DAILY
Qty: 10.7 G | Refills: 1 | Status: SHIPPED | OUTPATIENT
Start: 2025-03-14

## 2025-03-14 NOTE — TELEPHONE ENCOUNTER
Name of Medication(s) Requested:  Requested Prescriptions     Pending Prescriptions Disp Refills    albuterol sulfate HFA (PROVENTIL;VENTOLIN;PROAIR) 108 (90 Base) MCG/ACT inhaler 3 each 3     Sig: Inhale 2 puffs into the lungs every 6 hours as needed for Wheezing    Budeson-Glycopyrrol-Formoterol (BREZTRI AEROSPHERE) 160-9-4.8 MCG/ACT AERO 10.7 g 1     Sig: Inhale 1 Inhalation into the lungs in the morning and at bedtime    atorvastatin (LIPITOR) 40 MG tablet 90 tablet 3     Sig: Take 1 tablet by mouth daily    ARIPiprazole (ABILIFY) 10 MG tablet 90 tablet 3     Sig: Take 1 tablet by mouth every evening    amLODIPine (NORVASC) 10 MG tablet 90 tablet 3     Sig: Take 1 tablet by mouth daily    omeprazole (PRILOSEC) 20 MG delayed release capsule 180 capsule 3     Sig: Take 1 capsule by mouth 2 times daily    venlafaxine (EFFEXOR XR) 150 MG extended release capsule 90 capsule 3     Sig: Take 1 capsule by mouth daily    tamsulosin (FLOMAX) 0.4 MG capsule 90 capsule 3     Sig: Take 1 capsule by mouth nightly       Medication is on current medication list Yes    Dosage and directions were verified? Yes    Quantity verified: 90 day supply     Pharmacy Verified?  Yes    Last Appointment:  10/17/2024    Future appts:  Future Appointments   Date Time Provider Department Center   4/17/2025  2:30 PM Zhang Fitzpatrick DO [facility]        (If no appt send self scheduling link. .REFILLAPPT)  Scheduling request sent?     [] Yes  [] No    Does patient need updated?  [] Yes  [] NoName of Medication(s) Requested:

## 2025-03-18 DIAGNOSIS — K21.9 GASTROESOPHAGEAL REFLUX DISEASE WITHOUT ESOPHAGITIS: ICD-10-CM

## 2025-03-18 DIAGNOSIS — J44.1 COPD EXACERBATION (HCC): ICD-10-CM

## 2025-03-18 DIAGNOSIS — E78.2 MIXED HYPERLIPIDEMIA: ICD-10-CM

## 2025-03-18 DIAGNOSIS — F31.60 MIXED BIPOLAR I DISORDER (HCC): ICD-10-CM

## 2025-03-18 DIAGNOSIS — I10 ESSENTIAL (PRIMARY) HYPERTENSION: ICD-10-CM

## 2025-03-18 DIAGNOSIS — R35.0 FREQUENCY OF URINATION: ICD-10-CM

## 2025-03-18 DIAGNOSIS — R05.9 COUGH: ICD-10-CM

## 2025-03-18 RX ORDER — BUDESONIDE, GLYCOPYRROLATE, AND FORMOTEROL FUMARATE 160; 9; 4.8 UG/1; UG/1; UG/1
1 AEROSOL, METERED RESPIRATORY (INHALATION) 2 TIMES DAILY
Qty: 10.7 G | Refills: 1 | Status: SHIPPED | OUTPATIENT
Start: 2025-03-18

## 2025-03-18 RX ORDER — ARIPIPRAZOLE 10 MG/1
10 TABLET ORAL EVERY EVENING
Qty: 90 TABLET | Refills: 1 | Status: SHIPPED | OUTPATIENT
Start: 2025-03-18

## 2025-03-18 RX ORDER — TAMSULOSIN HYDROCHLORIDE 0.4 MG/1
0.4 CAPSULE ORAL NIGHTLY
Qty: 90 CAPSULE | Refills: 1 | Status: SHIPPED | OUTPATIENT
Start: 2025-03-18

## 2025-03-18 RX ORDER — AMLODIPINE BESYLATE 10 MG/1
10 TABLET ORAL DAILY
Qty: 90 TABLET | Refills: 1 | Status: SHIPPED | OUTPATIENT
Start: 2025-03-18

## 2025-03-18 RX ORDER — ATORVASTATIN CALCIUM 40 MG/1
40 TABLET, FILM COATED ORAL DAILY
Qty: 90 TABLET | Refills: 1 | Status: SHIPPED | OUTPATIENT
Start: 2025-03-18

## 2025-03-18 RX ORDER — VENLAFAXINE HYDROCHLORIDE 150 MG/1
150 CAPSULE, EXTENDED RELEASE ORAL DAILY
Qty: 90 CAPSULE | Refills: 1 | Status: SHIPPED | OUTPATIENT
Start: 2025-03-18

## 2025-03-18 RX ORDER — ALBUTEROL SULFATE 90 UG/1
2 INHALANT RESPIRATORY (INHALATION) EVERY 6 HOURS PRN
Qty: 3 EACH | Refills: 1 | Status: SHIPPED | OUTPATIENT
Start: 2025-03-18

## 2025-04-17 ENCOUNTER — OFFICE VISIT (OUTPATIENT)
Dept: FAMILY MEDICINE CLINIC | Age: 75
End: 2025-04-17
Payer: MEDICARE

## 2025-04-17 VITALS
WEIGHT: 189 LBS | HEIGHT: 72 IN | SYSTOLIC BLOOD PRESSURE: 105 MMHG | OXYGEN SATURATION: 94 % | DIASTOLIC BLOOD PRESSURE: 69 MMHG | HEART RATE: 68 BPM | TEMPERATURE: 97.8 F | RESPIRATION RATE: 18 BRPM | BODY MASS INDEX: 25.6 KG/M2

## 2025-04-17 DIAGNOSIS — Z00.00 MEDICARE ANNUAL WELLNESS VISIT, SUBSEQUENT: Primary | ICD-10-CM

## 2025-04-17 DIAGNOSIS — F51.05 INSOMNIA DUE TO OTHER MENTAL DISORDER: ICD-10-CM

## 2025-04-17 DIAGNOSIS — R06.09 DOE (DYSPNEA ON EXERTION): ICD-10-CM

## 2025-04-17 DIAGNOSIS — F99 INSOMNIA DUE TO OTHER MENTAL DISORDER: ICD-10-CM

## 2025-04-17 DIAGNOSIS — F51.04 PSYCHOPHYSIOLOGICAL INSOMNIA: ICD-10-CM

## 2025-04-17 PROCEDURE — 1159F MED LIST DOCD IN RCRD: CPT | Performed by: STUDENT IN AN ORGANIZED HEALTH CARE EDUCATION/TRAINING PROGRAM

## 2025-04-17 PROCEDURE — 3074F SYST BP LT 130 MM HG: CPT | Performed by: STUDENT IN AN ORGANIZED HEALTH CARE EDUCATION/TRAINING PROGRAM

## 2025-04-17 PROCEDURE — G0439 PPPS, SUBSEQ VISIT: HCPCS | Performed by: STUDENT IN AN ORGANIZED HEALTH CARE EDUCATION/TRAINING PROGRAM

## 2025-04-17 PROCEDURE — 1123F ACP DISCUSS/DSCN MKR DOCD: CPT | Performed by: STUDENT IN AN ORGANIZED HEALTH CARE EDUCATION/TRAINING PROGRAM

## 2025-04-17 PROCEDURE — 3078F DIAST BP <80 MM HG: CPT | Performed by: STUDENT IN AN ORGANIZED HEALTH CARE EDUCATION/TRAINING PROGRAM

## 2025-04-17 RX ORDER — TRAZODONE HYDROCHLORIDE 100 MG/1
200 TABLET ORAL NIGHTLY
Qty: 60 TABLET | Refills: 2 | Status: SHIPPED | OUTPATIENT
Start: 2025-04-17 | End: 2025-07-16

## 2025-04-17 SDOH — ECONOMIC STABILITY: FOOD INSECURITY: WITHIN THE PAST 12 MONTHS, THE FOOD YOU BOUGHT JUST DIDN'T LAST AND YOU DIDN'T HAVE MONEY TO GET MORE.: NEVER TRUE

## 2025-04-17 SDOH — ECONOMIC STABILITY: FOOD INSECURITY: WITHIN THE PAST 12 MONTHS, YOU WORRIED THAT YOUR FOOD WOULD RUN OUT BEFORE YOU GOT MONEY TO BUY MORE.: NEVER TRUE

## 2025-04-17 ASSESSMENT — PATIENT HEALTH QUESTIONNAIRE - PHQ9
SUM OF ALL RESPONSES TO PHQ QUESTIONS 1-9: 9
8. MOVING OR SPEAKING SO SLOWLY THAT OTHER PEOPLE COULD HAVE NOTICED. OR THE OPPOSITE, BEING SO FIGETY OR RESTLESS THAT YOU HAVE BEEN MOVING AROUND A LOT MORE THAN USUAL: NOT AT ALL
3. TROUBLE FALLING OR STAYING ASLEEP: NEARLY EVERY DAY
SUM OF ALL RESPONSES TO PHQ QUESTIONS 1-9: 9
1. LITTLE INTEREST OR PLEASURE IN DOING THINGS: NOT AT ALL
4. FEELING TIRED OR HAVING LITTLE ENERGY: NEARLY EVERY DAY
2. FEELING DOWN, DEPRESSED OR HOPELESS: NOT AT ALL
7. TROUBLE CONCENTRATING ON THINGS, SUCH AS READING THE NEWSPAPER OR WATCHING TELEVISION: NOT AT ALL
9. THOUGHTS THAT YOU WOULD BE BETTER OFF DEAD, OR OF HURTING YOURSELF: NOT AT ALL
6. FEELING BAD ABOUT YOURSELF - OR THAT YOU ARE A FAILURE OR HAVE LET YOURSELF OR YOUR FAMILY DOWN: NOT AT ALL
10. IF YOU CHECKED OFF ANY PROBLEMS, HOW DIFFICULT HAVE THESE PROBLEMS MADE IT FOR YOU TO DO YOUR WORK, TAKE CARE OF THINGS AT HOME, OR GET ALONG WITH OTHER PEOPLE: SOMEWHAT DIFFICULT
5. POOR APPETITE OR OVEREATING: NEARLY EVERY DAY

## 2025-04-17 NOTE — PROGRESS NOTES
Medicare Annual Wellness Visit    Gavin Lozoya is here for Medicare AWV    Assessment & Plan   Medicare annual wellness visit, subsequent  GABRIEL (dyspnea on exertion)  -     CT CHEST W CONTRAST; Future  -     Comprehensive Metabolic Panel; Future  -     CBC with Auto Differential; Future  Psychophysiological insomnia  Insomnia due to other mental disorder  -     traZODone (DESYREL) 100 MG tablet; Take 2 tablets by mouth nightly, Disp-60 tablet, R-2Normal       Return for Medicare Annual Wellness Visit in 1 year.     Subjective       Patient's complete Health Risk Assessment and screening values have been reviewed and are found in Flowsheets. The following problems were reviewed today and where indicated follow up appointments were made and/or referrals ordered.    Positive Risk Factor Screenings with Interventions:    Fall Risk:  Do you feel unsteady or are you worried about falling? : (!) yes  2 or more falls in past year?: (!) yes  Fall with injury in past year?: no     Interventions:    Reviewed medications, home hazards, visual acuity, and co-morbidities that can increase risk for falls     Depression:  PHQ-2 Score: 0  PHQ-9 Total Score: 9  Total Score Interpretation: 5-9 = mild depression  Interventions:  Patient declines any further evaluation or treatment     Drug Use:   Substance and Sexual Activity   Drug Use Yes    Frequency: 7.0 times per week    Types: Marijuana (Weed)     Interventions:  Patient declined any further intervention or treatment              Vision Screen:  Do you have difficulty driving, watching TV, or doing any of your daily activities because of your eyesight?: No  Have you had an eye exam within the past year?: (!) No  Interventions:   Patient encouraged to make appointment with their eye specialist      Advanced Directives:  Do you have a Living Will?: (!) No    Intervention:  has NO advanced directive - information provided                     Objective   Vitals:    04/17/25 1443

## 2025-04-17 NOTE — PROGRESS NOTES
GABRIEL - hx of smoking. No chest pain, no nausea or lightheadedness. No orthopnea. Recommend CT scan for further evaluation since this is chronic and worsening with no recent imaging. Other considerations include stress testing but no clear cardiac sx currently.     For insomnia, trial trazodone 200mg max per night. Stop if not effective.

## 2025-05-16 DIAGNOSIS — J44.1 COPD EXACERBATION (HCC): ICD-10-CM

## 2025-05-16 RX ORDER — BUDESONIDE, GLYCOPYRROLATE, AND FORMOTEROL FUMARATE 160; 9; 4.8 UG/1; UG/1; UG/1
AEROSOL, METERED RESPIRATORY (INHALATION)
Qty: 10.7 G | Refills: 0 | Status: SHIPPED | OUTPATIENT
Start: 2025-05-16

## 2025-06-14 DIAGNOSIS — J44.1 COPD EXACERBATION (HCC): ICD-10-CM

## 2025-06-17 RX ORDER — BUDESONIDE, GLYCOPYRROLATE, AND FORMOTEROL FUMARATE 160; 9; 4.8 UG/1; UG/1; UG/1
AEROSOL, METERED RESPIRATORY (INHALATION)
Qty: 10.7 G | Refills: 0 | Status: SHIPPED | OUTPATIENT
Start: 2025-06-17

## 2025-08-13 DIAGNOSIS — F31.60 MIXED BIPOLAR I DISORDER (HCC): ICD-10-CM

## 2025-08-13 DIAGNOSIS — F51.05 INSOMNIA DUE TO OTHER MENTAL DISORDER: ICD-10-CM

## 2025-08-13 DIAGNOSIS — F99 INSOMNIA DUE TO OTHER MENTAL DISORDER: ICD-10-CM

## 2025-08-13 DIAGNOSIS — K21.9 GASTROESOPHAGEAL REFLUX DISEASE WITHOUT ESOPHAGITIS: ICD-10-CM

## 2025-08-13 DIAGNOSIS — I10 ESSENTIAL (PRIMARY) HYPERTENSION: ICD-10-CM

## 2025-08-13 DIAGNOSIS — R35.0 FREQUENCY OF URINATION: ICD-10-CM

## 2025-08-13 DIAGNOSIS — E78.2 MIXED HYPERLIPIDEMIA: ICD-10-CM

## 2025-08-13 RX ORDER — ARIPIPRAZOLE 10 MG/1
10 TABLET ORAL EVERY EVENING
Qty: 90 TABLET | Refills: 1 | Status: ON HOLD | OUTPATIENT
Start: 2025-08-13

## 2025-08-13 RX ORDER — TRAZODONE HYDROCHLORIDE 100 MG/1
200 TABLET ORAL NIGHTLY
Qty: 180 TABLET | Refills: 1 | Status: ON HOLD | OUTPATIENT
Start: 2025-08-13 | End: 2026-02-09

## 2025-08-13 RX ORDER — VENLAFAXINE HYDROCHLORIDE 150 MG/1
150 CAPSULE, EXTENDED RELEASE ORAL DAILY
Qty: 90 CAPSULE | Refills: 1 | Status: ON HOLD | OUTPATIENT
Start: 2025-08-13

## 2025-08-13 RX ORDER — OMEPRAZOLE 20 MG/1
20 CAPSULE, DELAYED RELEASE ORAL 2 TIMES DAILY
Qty: 180 CAPSULE | Refills: 1 | Status: ON HOLD | OUTPATIENT
Start: 2025-08-13

## 2025-08-13 RX ORDER — AMLODIPINE BESYLATE 10 MG/1
10 TABLET ORAL DAILY
Qty: 90 TABLET | Refills: 1 | Status: ON HOLD | OUTPATIENT
Start: 2025-08-13

## 2025-08-13 RX ORDER — ATORVASTATIN CALCIUM 40 MG/1
40 TABLET, FILM COATED ORAL DAILY
Qty: 90 TABLET | Refills: 1 | Status: ON HOLD | OUTPATIENT
Start: 2025-08-13

## 2025-08-13 RX ORDER — TAMSULOSIN HYDROCHLORIDE 0.4 MG/1
0.4 CAPSULE ORAL NIGHTLY
Qty: 90 CAPSULE | Refills: 1 | Status: ON HOLD | OUTPATIENT
Start: 2025-08-13

## 2025-08-16 ENCOUNTER — APPOINTMENT (OUTPATIENT)
Dept: CT IMAGING | Age: 75
DRG: 812 | End: 2025-08-16
Payer: MEDICARE

## 2025-08-16 ENCOUNTER — HOSPITAL ENCOUNTER (INPATIENT)
Age: 75
LOS: 8 days | Discharge: HOME HEALTH | DRG: 812 | End: 2025-08-24
Attending: EMERGENCY MEDICINE | Admitting: HOSPITALIST
Payer: MEDICARE

## 2025-08-16 ENCOUNTER — APPOINTMENT (OUTPATIENT)
Dept: ULTRASOUND IMAGING | Age: 75
DRG: 812 | End: 2025-08-16
Payer: MEDICARE

## 2025-08-16 ENCOUNTER — APPOINTMENT (OUTPATIENT)
Dept: GENERAL RADIOLOGY | Age: 75
DRG: 812 | End: 2025-08-16
Payer: MEDICARE

## 2025-08-16 DIAGNOSIS — R06.02 SHORTNESS OF BREATH: ICD-10-CM

## 2025-08-16 DIAGNOSIS — M79.89 LEFT ARM SWELLING: ICD-10-CM

## 2025-08-16 DIAGNOSIS — D64.9 ANEMIA, UNSPECIFIED TYPE: Primary | ICD-10-CM

## 2025-08-16 DIAGNOSIS — R06.00 DYSPNEA, UNSPECIFIED TYPE: ICD-10-CM

## 2025-08-16 DIAGNOSIS — K92.2 GASTROINTESTINAL HEMORRHAGE, UNSPECIFIED GASTROINTESTINAL HEMORRHAGE TYPE: ICD-10-CM

## 2025-08-16 PROBLEM — M88.9 PAGET DISEASE OF BONE: Status: ACTIVE | Noted: 2025-08-16

## 2025-08-16 PROBLEM — R53.83 FATIGUE: Status: ACTIVE | Noted: 2025-08-16

## 2025-08-16 PROBLEM — R79.89 ELEVATED TROPONIN: Status: ACTIVE | Noted: 2025-08-16

## 2025-08-16 PROBLEM — R22.43 LOCALIZED SWELLING OF BOTH LOWER LEGS: Status: ACTIVE | Noted: 2025-08-16

## 2025-08-16 PROBLEM — R06.09 DYSPNEA ON EXERTION: Status: ACTIVE | Noted: 2025-08-16

## 2025-08-16 LAB
ALBUMIN SERPL-MCNC: 3.8 G/DL (ref 3.5–5.2)
ALP SERPL-CCNC: 102 U/L (ref 40–129)
ALT SERPL-CCNC: 13 U/L (ref 0–50)
ANION GAP SERPL CALCULATED.3IONS-SCNC: 11 MMOL/L (ref 7–16)
AST SERPL-CCNC: 15 U/L (ref 0–50)
BASOPHILS # BLD: 0.04 K/UL (ref 0–0.2)
BASOPHILS NFR BLD: 1 % (ref 0–2)
BILIRUB DIRECT SERPL-MCNC: 0.5 MG/DL (ref 0–0.2)
BILIRUB INDIRECT SERPL-MCNC: 0.4 MG/DL (ref 0–1)
BILIRUB SERPL-MCNC: 0.5 MG/DL (ref 0–1.2)
BILIRUB SERPL-MCNC: 0.8 MG/DL (ref 0–1.2)
BNP SERPL-MCNC: 300 PG/ML (ref 0–450)
BUN SERPL-MCNC: 12 MG/DL (ref 8–23)
CALCIUM SERPL-MCNC: 8.3 MG/DL (ref 8.8–10.2)
CHLORIDE SERPL-SCNC: 107 MMOL/L (ref 98–107)
CO2 SERPL-SCNC: 21 MMOL/L (ref 22–29)
CREAT SERPL-MCNC: 0.8 MG/DL (ref 0.7–1.2)
EOSINOPHIL # BLD: 0.03 K/UL (ref 0.05–0.5)
EOSINOPHILS RELATIVE PERCENT: 0 % (ref 0–6)
ERYTHROCYTE [DISTWIDTH] IN BLOOD BY AUTOMATED COUNT: 21.2 % (ref 11.5–15)
GFR, ESTIMATED: >90 ML/MIN/1.73M2
GLUCOSE SERPL-MCNC: 119 MG/DL (ref 74–99)
HCT VFR BLD AUTO: 19.4 % (ref 37–54)
HGB BLD-MCNC: 4.8 G/DL (ref 12.5–16.5)
IMM GRANULOCYTES # BLD AUTO: 0.03 K/UL (ref 0–0.58)
IMM GRANULOCYTES NFR BLD: 0 % (ref 0–5)
IMM RETICS NFR: 24.7 % (ref 2.3–13.4)
LDH SERPL-CCNC: 183 U/L (ref 135–225)
LYMPHOCYTES NFR BLD: 0.6 K/UL (ref 1.5–4)
LYMPHOCYTES RELATIVE PERCENT: 9 % (ref 20–42)
MAGNESIUM SERPL-MCNC: 1.8 MG/DL (ref 1.6–2.4)
MAGNESIUM SERPL-MCNC: 1.9 MG/DL (ref 1.6–2.4)
MCH RBC QN AUTO: 15.1 PG (ref 26–35)
MCHC RBC AUTO-ENTMCNC: 24.7 G/DL (ref 32–34.5)
MCV RBC AUTO: 61 FL (ref 80–99.9)
MONOCYTES NFR BLD: 0.53 K/UL (ref 0.1–0.95)
MONOCYTES NFR BLD: 8 % (ref 2–12)
NEUTROPHILS NFR BLD: 82 % (ref 43–80)
NEUTS SEG NFR BLD: 5.46 K/UL (ref 1.8–7.3)
PLATELET CONFIRMATION: NORMAL
PLATELET, FLUORESCENCE: 324 K/UL (ref 130–450)
PMV BLD AUTO: 10 FL (ref 7–12)
POTASSIUM SERPL-SCNC: 3.5 MMOL/L (ref 3.5–5.1)
PROT SERPL-MCNC: 6.3 G/DL (ref 6.4–8.3)
RBC # BLD AUTO: 3.18 M/UL (ref 3.8–5.8)
RBC # BLD: ABNORMAL 10*6/UL
RETIC HEMOGLOBIN: 15.7 PG (ref 28.2–36.6)
RETICS # AUTO: 0.03 M/UL
RETICS/RBC NFR AUTO: 0.8 % (ref 0.4–1.9)
SODIUM SERPL-SCNC: 139 MMOL/L (ref 136–145)
TROPONIN I SERPL HS-MCNC: 18 NG/L (ref 0–22)
TROPONIN I SERPL HS-MCNC: 30 NG/L (ref 0–22)
TSH SERPL DL<=0.05 MIU/L-ACNC: 0.8 UIU/ML (ref 0.27–4.2)
WBC OTHER # BLD: 6.7 K/UL (ref 4.5–11.5)

## 2025-08-16 PROCEDURE — APPSS60 APP SPLIT SHARED TIME 46-60 MINUTES: Performed by: NURSE PRACTITIONER

## 2025-08-16 PROCEDURE — 85025 COMPLETE CBC W/AUTO DIFF WBC: CPT

## 2025-08-16 PROCEDURE — 73590 X-RAY EXAM OF LOWER LEG: CPT

## 2025-08-16 PROCEDURE — 73630 X-RAY EXAM OF FOOT: CPT

## 2025-08-16 PROCEDURE — 99285 EMERGENCY DEPT VISIT HI MDM: CPT

## 2025-08-16 PROCEDURE — 84484 ASSAY OF TROPONIN QUANT: CPT

## 2025-08-16 PROCEDURE — 6360000002 HC RX W HCPCS: Performed by: NURSE PRACTITIONER

## 2025-08-16 PROCEDURE — 83010 ASSAY OF HAPTOGLOBIN QUANT: CPT

## 2025-08-16 PROCEDURE — 82728 ASSAY OF FERRITIN: CPT

## 2025-08-16 PROCEDURE — 71045 X-RAY EXAM CHEST 1 VIEW: CPT

## 2025-08-16 PROCEDURE — 83880 ASSAY OF NATRIURETIC PEPTIDE: CPT

## 2025-08-16 PROCEDURE — 96374 THER/PROPH/DIAG INJ IV PUSH: CPT

## 2025-08-16 PROCEDURE — 2060000000 HC ICU INTERMEDIATE R&B

## 2025-08-16 PROCEDURE — 83540 ASSAY OF IRON: CPT

## 2025-08-16 PROCEDURE — 30233N1 TRANSFUSION OF NONAUTOLOGOUS RED BLOOD CELLS INTO PERIPHERAL VEIN, PERCUTANEOUS APPROACH: ICD-10-PCS | Performed by: HOSPITALIST

## 2025-08-16 PROCEDURE — 86901 BLOOD TYPING SEROLOGIC RH(D): CPT

## 2025-08-16 PROCEDURE — 6360000002 HC RX W HCPCS

## 2025-08-16 PROCEDURE — 84466 ASSAY OF TRANSFERRIN: CPT

## 2025-08-16 PROCEDURE — 82746 ASSAY OF FOLIC ACID SERUM: CPT

## 2025-08-16 PROCEDURE — 6360000002 HC RX W HCPCS: Performed by: EMERGENCY MEDICINE

## 2025-08-16 PROCEDURE — 2580000003 HC RX 258: Performed by: EMERGENCY MEDICINE

## 2025-08-16 PROCEDURE — 93970 EXTREMITY STUDY: CPT

## 2025-08-16 PROCEDURE — 94664 DEMO&/EVAL PT USE INHALER: CPT

## 2025-08-16 PROCEDURE — 86923 COMPATIBILITY TEST ELECTRIC: CPT

## 2025-08-16 PROCEDURE — 6370000000 HC RX 637 (ALT 250 FOR IP)

## 2025-08-16 PROCEDURE — 80053 COMPREHEN METABOLIC PANEL: CPT

## 2025-08-16 PROCEDURE — 70450 CT HEAD/BRAIN W/O DYE: CPT

## 2025-08-16 PROCEDURE — 86900 BLOOD TYPING SEROLOGIC ABO: CPT

## 2025-08-16 PROCEDURE — 93005 ELECTROCARDIOGRAM TRACING: CPT

## 2025-08-16 PROCEDURE — 82248 BILIRUBIN DIRECT: CPT

## 2025-08-16 PROCEDURE — 83615 LACTATE (LD) (LDH) ENZYME: CPT

## 2025-08-16 PROCEDURE — 86850 RBC ANTIBODY SCREEN: CPT

## 2025-08-16 PROCEDURE — 85045 AUTOMATED RETICULOCYTE COUNT: CPT

## 2025-08-16 PROCEDURE — 82607 VITAMIN B-12: CPT

## 2025-08-16 PROCEDURE — 96376 TX/PRO/DX INJ SAME DRUG ADON: CPT

## 2025-08-16 PROCEDURE — 96375 TX/PRO/DX INJ NEW DRUG ADDON: CPT

## 2025-08-16 PROCEDURE — P9016 RBC LEUKOCYTES REDUCED: HCPCS

## 2025-08-16 PROCEDURE — 84443 ASSAY THYROID STIM HORMONE: CPT

## 2025-08-16 PROCEDURE — 83735 ASSAY OF MAGNESIUM: CPT

## 2025-08-16 RX ORDER — ARFORMOTEROL TARTRATE 15 UG/2ML
15 SOLUTION RESPIRATORY (INHALATION)
Status: DISCONTINUED | OUTPATIENT
Start: 2025-08-17 | End: 2025-08-24 | Stop reason: HOSPADM

## 2025-08-16 RX ORDER — SODIUM CHLORIDE 9 MG/ML
INJECTION, SOLUTION INTRAVENOUS PRN
Status: DISCONTINUED | OUTPATIENT
Start: 2025-08-16 | End: 2025-08-24 | Stop reason: HOSPADM

## 2025-08-16 RX ORDER — ENOXAPARIN SODIUM 100 MG/ML
40 INJECTION SUBCUTANEOUS DAILY
Status: DISCONTINUED | OUTPATIENT
Start: 2025-08-17 | End: 2025-08-24 | Stop reason: HOSPADM

## 2025-08-16 RX ORDER — ACETAMINOPHEN 650 MG/1
650 SUPPOSITORY RECTAL EVERY 6 HOURS PRN
Status: DISCONTINUED | OUTPATIENT
Start: 2025-08-16 | End: 2025-08-24 | Stop reason: HOSPADM

## 2025-08-16 RX ORDER — FENTANYL CITRATE 50 UG/ML
25 INJECTION, SOLUTION INTRAMUSCULAR; INTRAVENOUS ONCE
Refills: 0 | Status: COMPLETED | OUTPATIENT
Start: 2025-08-16 | End: 2025-08-16

## 2025-08-16 RX ORDER — VENLAFAXINE HYDROCHLORIDE 150 MG/1
150 CAPSULE, EXTENDED RELEASE ORAL DAILY
Status: DISCONTINUED | OUTPATIENT
Start: 2025-08-17 | End: 2025-08-24 | Stop reason: HOSPADM

## 2025-08-16 RX ORDER — ACETAMINOPHEN 325 MG/1
650 TABLET ORAL EVERY 6 HOURS PRN
Status: DISCONTINUED | OUTPATIENT
Start: 2025-08-16 | End: 2025-08-24 | Stop reason: HOSPADM

## 2025-08-16 RX ORDER — PANTOPRAZOLE SODIUM 40 MG/10ML
40 INJECTION, POWDER, LYOPHILIZED, FOR SOLUTION INTRAVENOUS ONCE
Status: COMPLETED | OUTPATIENT
Start: 2025-08-16 | End: 2025-08-16

## 2025-08-16 RX ORDER — MAGNESIUM SULFATE IN WATER 40 MG/ML
2000 INJECTION, SOLUTION INTRAVENOUS PRN
Status: DISCONTINUED | OUTPATIENT
Start: 2025-08-16 | End: 2025-08-24 | Stop reason: HOSPADM

## 2025-08-16 RX ORDER — IPRATROPIUM BROMIDE AND ALBUTEROL SULFATE 2.5; .5 MG/3ML; MG/3ML
1 SOLUTION RESPIRATORY (INHALATION) ONCE
Status: COMPLETED | OUTPATIENT
Start: 2025-08-16 | End: 2025-08-16

## 2025-08-16 RX ORDER — BUDESONIDE 0.5 MG/2ML
0.5 INHALANT ORAL
Status: DISCONTINUED | OUTPATIENT
Start: 2025-08-17 | End: 2025-08-24 | Stop reason: HOSPADM

## 2025-08-16 RX ORDER — SODIUM CHLORIDE 9 MG/ML
INJECTION, SOLUTION INTRAVENOUS PRN
Status: COMPLETED | OUTPATIENT
Start: 2025-08-16 | End: 2025-08-21

## 2025-08-16 RX ORDER — SODIUM CHLORIDE 0.9 % (FLUSH) 0.9 %
5-40 SYRINGE (ML) INJECTION EVERY 12 HOURS SCHEDULED
Status: DISCONTINUED | OUTPATIENT
Start: 2025-08-16 | End: 2025-08-24 | Stop reason: HOSPADM

## 2025-08-16 RX ORDER — SODIUM CHLORIDE 0.9 % (FLUSH) 0.9 %
5-40 SYRINGE (ML) INJECTION PRN
Status: DISCONTINUED | OUTPATIENT
Start: 2025-08-16 | End: 2025-08-24 | Stop reason: HOSPADM

## 2025-08-16 RX ORDER — ONDANSETRON 2 MG/ML
4 INJECTION INTRAMUSCULAR; INTRAVENOUS EVERY 6 HOURS PRN
Status: DISCONTINUED | OUTPATIENT
Start: 2025-08-16 | End: 2025-08-24 | Stop reason: HOSPADM

## 2025-08-16 RX ORDER — POLYETHYLENE GLYCOL 3350 17 G/17G
17 POWDER, FOR SOLUTION ORAL DAILY PRN
Status: DISCONTINUED | OUTPATIENT
Start: 2025-08-16 | End: 2025-08-20

## 2025-08-16 RX ORDER — AMLODIPINE BESYLATE 10 MG/1
10 TABLET ORAL NIGHTLY
Status: DISCONTINUED | OUTPATIENT
Start: 2025-08-17 | End: 2025-08-24 | Stop reason: HOSPADM

## 2025-08-16 RX ORDER — ATORVASTATIN CALCIUM 40 MG/1
40 TABLET, FILM COATED ORAL DAILY
Status: DISCONTINUED | OUTPATIENT
Start: 2025-08-17 | End: 2025-08-24 | Stop reason: HOSPADM

## 2025-08-16 RX ORDER — OXYCODONE HYDROCHLORIDE 5 MG/1
5 TABLET ORAL EVERY 4 HOURS PRN
Refills: 0 | Status: DISCONTINUED | OUTPATIENT
Start: 2025-08-16 | End: 2025-08-24 | Stop reason: HOSPADM

## 2025-08-16 RX ORDER — TAMSULOSIN HYDROCHLORIDE 0.4 MG/1
0.4 CAPSULE ORAL NIGHTLY
Status: DISCONTINUED | OUTPATIENT
Start: 2025-08-17 | End: 2025-08-24 | Stop reason: HOSPADM

## 2025-08-16 RX ORDER — POTASSIUM CHLORIDE 1500 MG/1
40 TABLET, EXTENDED RELEASE ORAL PRN
Status: DISCONTINUED | OUTPATIENT
Start: 2025-08-16 | End: 2025-08-24 | Stop reason: HOSPADM

## 2025-08-16 RX ORDER — NAPROXEN 500 MG/1
500 TABLET ORAL PRN
Status: ON HOLD | COMMUNITY
End: 2025-08-23 | Stop reason: HOSPADM

## 2025-08-16 RX ORDER — ALBUTEROL SULFATE 0.83 MG/ML
2.5 SOLUTION RESPIRATORY (INHALATION) EVERY 6 HOURS PRN
Status: DISCONTINUED | OUTPATIENT
Start: 2025-08-16 | End: 2025-08-24 | Stop reason: HOSPADM

## 2025-08-16 RX ORDER — ARIPIPRAZOLE 10 MG/1
10 TABLET ORAL EVERY EVENING
Status: DISCONTINUED | OUTPATIENT
Start: 2025-08-17 | End: 2025-08-24 | Stop reason: HOSPADM

## 2025-08-16 RX ORDER — ONDANSETRON 4 MG/1
4 TABLET, ORALLY DISINTEGRATING ORAL EVERY 8 HOURS PRN
Status: DISCONTINUED | OUTPATIENT
Start: 2025-08-16 | End: 2025-08-24 | Stop reason: HOSPADM

## 2025-08-16 RX ORDER — POTASSIUM CHLORIDE 7.45 MG/ML
10 INJECTION INTRAVENOUS PRN
Status: DISCONTINUED | OUTPATIENT
Start: 2025-08-16 | End: 2025-08-24 | Stop reason: HOSPADM

## 2025-08-16 RX ORDER — 0.9 % SODIUM CHLORIDE 0.9 %
500 INTRAVENOUS SOLUTION INTRAVENOUS ONCE
Status: COMPLETED | OUTPATIENT
Start: 2025-08-16 | End: 2025-08-16

## 2025-08-16 RX ADMIN — SODIUM CHLORIDE 500 ML: 0.9 INJECTION, SOLUTION INTRAVENOUS at 16:39

## 2025-08-16 RX ADMIN — FENTANYL CITRATE 25 MCG: 50 INJECTION INTRAMUSCULAR; INTRAVENOUS at 18:59

## 2025-08-16 RX ADMIN — HYDROMORPHONE HYDROCHLORIDE 0.25 MG: 1 INJECTION, SOLUTION INTRAMUSCULAR; INTRAVENOUS; SUBCUTANEOUS at 23:50

## 2025-08-16 RX ADMIN — PANTOPRAZOLE SODIUM 40 MG: 40 INJECTION, POWDER, FOR SOLUTION INTRAVENOUS at 18:31

## 2025-08-16 RX ADMIN — IPRATROPIUM BROMIDE AND ALBUTEROL SULFATE 1 DOSE: .5; 2.5 SOLUTION RESPIRATORY (INHALATION) at 22:27

## 2025-08-16 RX ADMIN — FENTANYL CITRATE 25 MCG: 50 INJECTION INTRAMUSCULAR; INTRAVENOUS at 16:34

## 2025-08-16 ASSESSMENT — PAIN DESCRIPTION - DESCRIPTORS
DESCRIPTORS: ACHING
DESCRIPTORS: SHARP;DISCOMFORT

## 2025-08-16 ASSESSMENT — PAIN SCALES - GENERAL
PAINLEVEL_OUTOF10: 5
PAINLEVEL_OUTOF10: 7
PAINLEVEL_OUTOF10: 10
PAINLEVEL_OUTOF10: 4
PAINLEVEL_OUTOF10: 9
PAINLEVEL_OUTOF10: 8

## 2025-08-16 ASSESSMENT — PAIN - FUNCTIONAL ASSESSMENT
PAIN_FUNCTIONAL_ASSESSMENT: 0-10
PAIN_FUNCTIONAL_ASSESSMENT: 0-10
PAIN_FUNCTIONAL_ASSESSMENT: PREVENTS OR INTERFERES WITH MANY ACTIVE NOT PASSIVE ACTIVITIES
PAIN_FUNCTIONAL_ASSESSMENT: 0-10

## 2025-08-16 ASSESSMENT — PAIN DESCRIPTION - ORIENTATION
ORIENTATION: RIGHT;LEFT
ORIENTATION: LEFT;RIGHT
ORIENTATION: RIGHT;LEFT

## 2025-08-16 ASSESSMENT — PAIN DESCRIPTION - LOCATION
LOCATION: LEG

## 2025-08-17 ENCOUNTER — APPOINTMENT (OUTPATIENT)
Age: 75
DRG: 812 | End: 2025-08-17
Payer: MEDICARE

## 2025-08-17 LAB
ABO/RH: NORMAL
ALBUMIN SERPL-MCNC: 3.5 G/DL (ref 3.5–5.2)
ALP SERPL-CCNC: 92 U/L (ref 40–129)
ALT SERPL-CCNC: 11 U/L (ref 0–50)
ANION GAP SERPL CALCULATED.3IONS-SCNC: 12 MMOL/L (ref 7–16)
ANTIBODY SCREEN: NEGATIVE
ARM BAND NUMBER: NORMAL
AST SERPL-CCNC: 15 U/L (ref 0–50)
BASOPHILS # BLD: 0.15 K/UL (ref 0–0.2)
BASOPHILS NFR BLD: 2 % (ref 0–2)
BILIRUB SERPL-MCNC: 1.2 MG/DL (ref 0–1.2)
BLOOD BANK BLOOD PRODUCT EXPIRATION DATE: NORMAL
BLOOD BANK BLOOD PRODUCT EXPIRATION DATE: NORMAL
BLOOD BANK COMMENT: NORMAL
BLOOD BANK DISPENSE STATUS: NORMAL
BLOOD BANK DISPENSE STATUS: NORMAL
BLOOD BANK ISBT PRODUCT BLOOD TYPE: 5100
BLOOD BANK ISBT PRODUCT BLOOD TYPE: 5100
BLOOD BANK PRODUCT CODE: NORMAL
BLOOD BANK PRODUCT CODE: NORMAL
BLOOD BANK SAMPLE EXPIRATION: NORMAL
BLOOD BANK UNIT TYPE AND RH: NORMAL
BLOOD BANK UNIT TYPE AND RH: NORMAL
BPU ID: NORMAL
BPU ID: NORMAL
BUN SERPL-MCNC: 9 MG/DL (ref 8–23)
CALCIUM SERPL-MCNC: 8.1 MG/DL (ref 8.8–10.2)
CHLORIDE SERPL-SCNC: 107 MMOL/L (ref 98–107)
CO2 SERPL-SCNC: 20 MMOL/L (ref 22–29)
COMPONENT: NORMAL
COMPONENT: NORMAL
CREAT SERPL-MCNC: 0.7 MG/DL (ref 0.7–1.2)
CROSSMATCH RESULT: NORMAL
CROSSMATCH RESULT: NORMAL
ECHO AO ASC DIAM: 3.7 CM
ECHO AO ASCENDING AORTA INDEX: 1.8 CM/M2
ECHO AO ROOT DIAM: 3.9 CM
ECHO AO ROOT INDEX: 1.89 CM/M2
ECHO AO SINUS VALSALVA DIAM: 3.8 CM
ECHO AO SINUS VALSALVA INDEX: 1.84 CM/M2
ECHO AR MAX VEL PISA: 3.8 M/S
ECHO AV AREA PEAK VELOCITY: 3.2 CM2
ECHO AV AREA VTI: 2.9 CM2
ECHO AV AREA/BSA PEAK VELOCITY: 1.6 CM2/M2
ECHO AV AREA/BSA VTI: 1.4 CM2/M2
ECHO AV CUSP MM: 2.3 CM
ECHO AV MEAN GRADIENT: 5 MMHG
ECHO AV MEAN VELOCITY: 1.1 M/S
ECHO AV PEAK GRADIENT: 10 MMHG
ECHO AV PEAK VELOCITY: 1.6 M/S
ECHO AV REGURGITANT PHT: 691 MS
ECHO AV VELOCITY RATIO: 0.75
ECHO AV VTI: 31.9 CM
ECHO BSA: 2.06 M2
ECHO EST RA PRESSURE: 3 MMHG
ECHO LA DIAMETER INDEX: 1.75 CM/M2
ECHO LA DIAMETER: 3.6 CM
ECHO LA TO AORTIC ROOT RATIO: 0.92
ECHO LA VOL A-L A2C: 95 ML (ref 18–58)
ECHO LA VOL A-L A4C: 73 ML (ref 18–58)
ECHO LA VOL MOD A2C: 89 ML (ref 18–58)
ECHO LA VOL MOD A4C: 62 ML (ref 18–58)
ECHO LA VOLUME AREA LENGTH: 89 ML
ECHO LA VOLUME INDEX A-L A2C: 46 ML/M2 (ref 16–34)
ECHO LA VOLUME INDEX A-L A4C: 35 ML/M2 (ref 16–34)
ECHO LA VOLUME INDEX AREA LENGTH: 43 ML/M2 (ref 16–34)
ECHO LA VOLUME INDEX MOD A2C: 43 ML/M2 (ref 16–34)
ECHO LA VOLUME INDEX MOD A4C: 30 ML/M2 (ref 16–34)
ECHO LV E' LATERAL VELOCITY: 8 CM/S
ECHO LV E' SEPTAL VELOCITY: 6 CM/S
ECHO LV EDV A2C: 92 ML
ECHO LV EDV A4C: 119 ML
ECHO LV EDV BP: 107 ML (ref 67–155)
ECHO LV EDV INDEX A4C: 58 ML/M2
ECHO LV EDV INDEX BP: 52 ML/M2
ECHO LV EDV NDEX A2C: 45 ML/M2
ECHO LV EF PHYSICIAN: 65 %
ECHO LV EJECTION FRACTION A2C: 59 %
ECHO LV EJECTION FRACTION A4C: 68 %
ECHO LV EJECTION FRACTION BIPLANE: 65 % (ref 55–100)
ECHO LV ESV A2C: 38 ML
ECHO LV ESV A4C: 38 ML
ECHO LV ESV BP: 38 ML (ref 22–58)
ECHO LV ESV INDEX A2C: 18 ML/M2
ECHO LV ESV INDEX A4C: 18 ML/M2
ECHO LV ESV INDEX BP: 18 ML/M2
ECHO LV FRACTIONAL SHORTENING: 34 % (ref 28–44)
ECHO LV INTERNAL DIMENSION DIASTOLE INDEX: 2.72 CM/M2
ECHO LV INTERNAL DIMENSION DIASTOLIC: 5.6 CM (ref 4.2–5.9)
ECHO LV INTERNAL DIMENSION SYSTOLIC INDEX: 1.8 CM/M2
ECHO LV INTERNAL DIMENSION SYSTOLIC: 3.7 CM
ECHO LV IVSD: 1.1 CM (ref 0.6–1)
ECHO LV IVSS: 1.3 CM
ECHO LV MASS 2D: 205.5 G (ref 88–224)
ECHO LV MASS INDEX 2D: 99.8 G/M2 (ref 49–115)
ECHO LV POSTERIOR WALL DIASTOLIC: 0.8 CM (ref 0.6–1)
ECHO LV POSTERIOR WALL SYSTOLIC: 1.3 CM
ECHO LV RELATIVE WALL THICKNESS RATIO: 0.29
ECHO LVOT AREA: 4.5 CM2
ECHO LVOT AV VTI INDEX: 0.67
ECHO LVOT DIAM: 2.4 CM
ECHO LVOT MEAN GRADIENT: 3 MMHG
ECHO LVOT PEAK GRADIENT: 6 MMHG
ECHO LVOT PEAK VELOCITY: 1.2 M/S
ECHO LVOT STROKE VOLUME INDEX: 47.2 ML/M2
ECHO LVOT SV: 97.2 ML
ECHO LVOT VTI: 21.5 CM
ECHO MV "A" WAVE DURATION: 101.5 MSEC
ECHO MV A VELOCITY: 0.94 M/S
ECHO MV AREA PHT: 3.2 CM2
ECHO MV AREA VTI: 3 CM2
ECHO MV E DECELERATION TIME (DT): 218.5 MS
ECHO MV E VELOCITY: 0.89 M/S
ECHO MV E/A RATIO: 0.95
ECHO MV E/E' LATERAL: 11.13
ECHO MV E/E' RATIO (AVERAGED): 12.98
ECHO MV E/E' SEPTAL: 14.83
ECHO MV LVOT VTI INDEX: 1.52
ECHO MV MAX VELOCITY: 0.9 M/S
ECHO MV MEAN GRADIENT: 2 MMHG
ECHO MV MEAN VELOCITY: 0.6 M/S
ECHO MV PEAK GRADIENT: 3 MMHG
ECHO MV PRESSURE HALF TIME (PHT): 68.8 MS
ECHO MV VTI: 32.6 CM
ECHO PV MAX VELOCITY: 0.7 M/S
ECHO PV MEAN GRADIENT: 1 MMHG
ECHO PV MEAN VELOCITY: 0.5 M/S
ECHO PV PEAK GRADIENT: 2 MMHG
ECHO PV VTI: 17.8 CM
ECHO RIGHT VENTRICULAR SYSTOLIC PRESSURE (RVSP): 34 MMHG
ECHO RV INTERNAL DIMENSION: 3.2 CM
ECHO RV TAPSE: 2.7 CM (ref 1.7–?)
ECHO TV REGURGITANT MAX VELOCITY: 2.8 M/S
ECHO TV REGURGITANT PEAK GRADIENT: 47 MMHG
EOSINOPHIL # BLD: 0.15 K/UL (ref 0.05–0.5)
EOSINOPHILS RELATIVE PERCENT: 2 % (ref 0–6)
ERYTHROCYTE [DISTWIDTH] IN BLOOD BY AUTOMATED COUNT: 25.4 % (ref 11.5–15)
FERRITIN SERPL-MCNC: 7 NG/ML
FOLATE SERPL-MCNC: 8 NG/ML (ref 4.6–34.8)
GFR, ESTIMATED: >90 ML/MIN/1.73M2
GLUCOSE SERPL-MCNC: 96 MG/DL (ref 74–99)
HAPTOGLOB SERPL-MCNC: 164 MG/DL (ref 30–200)
HBA1C MFR BLD: 5.7 % (ref 4–5.6)
HCT VFR BLD AUTO: 24.6 % (ref 37–54)
HGB BLD-MCNC: 7.2 G/DL (ref 12.5–16.5)
IRON SATN MFR SERPL: 4 % (ref 20–55)
IRON SERPL-MCNC: 16 UG/DL (ref 61–157)
LYMPHOCYTES NFR BLD: 0.84 K/UL (ref 1.5–4)
LYMPHOCYTES RELATIVE PERCENT: 10 % (ref 20–42)
MAGNESIUM SERPL-MCNC: 1.8 MG/DL (ref 1.6–2.4)
MCH RBC QN AUTO: 19.4 PG (ref 26–35)
MCHC RBC AUTO-ENTMCNC: 29.3 G/DL (ref 32–34.5)
MCV RBC AUTO: 66.3 FL (ref 80–99.9)
MONOCYTES NFR BLD: 0.46 K/UL (ref 0.1–0.95)
MONOCYTES NFR BLD: 5 % (ref 2–12)
NEUTROPHILS NFR BLD: 82 % (ref 43–80)
NEUTS SEG NFR BLD: 7.1 K/UL (ref 1.8–7.3)
PLATELET, FLUORESCENCE: 261 K/UL (ref 130–450)
PMV BLD AUTO: ABNORMAL FL (ref 7–12)
POTASSIUM SERPL-SCNC: 3.3 MMOL/L (ref 3.5–5.1)
PROT SERPL-MCNC: 5.9 G/DL (ref 6.4–8.3)
RBC # BLD AUTO: 3.71 M/UL (ref 3.8–5.8)
RBC # BLD: ABNORMAL 10*6/UL
SODIUM SERPL-SCNC: 140 MMOL/L (ref 136–145)
TIBC SERPL-MCNC: 404 UG/DL (ref 250–450)
TRANSFERRIN SERPL-MCNC: 314 MG/DL (ref 200–360)
TRANSFUSION STATUS: NORMAL
TRANSFUSION STATUS: NORMAL
TROPONIN I SERPL HS-MCNC: 23 NG/L (ref 0–22)
UNIT DIVISION: 0
UNIT DIVISION: 0
UNIT ISSUE DATE/TIME: NORMAL
UNIT ISSUE DATE/TIME: NORMAL
VIT B12 SERPL-MCNC: 438 PG/ML (ref 232–1245)
WBC OTHER # BLD: 8.7 K/UL (ref 4.5–11.5)

## 2025-08-17 PROCEDURE — 2580000003 HC RX 258: Performed by: INTERNAL MEDICINE

## 2025-08-17 PROCEDURE — 80053 COMPREHEN METABOLIC PANEL: CPT

## 2025-08-17 PROCEDURE — 6360000002 HC RX W HCPCS: Performed by: NURSE PRACTITIONER

## 2025-08-17 PROCEDURE — 85025 COMPLETE CBC W/AUTO DIFF WBC: CPT

## 2025-08-17 PROCEDURE — 93306 TTE W/DOPPLER COMPLETE: CPT

## 2025-08-17 PROCEDURE — 36430 TRANSFUSION BLD/BLD COMPNT: CPT

## 2025-08-17 PROCEDURE — 94640 AIRWAY INHALATION TREATMENT: CPT

## 2025-08-17 PROCEDURE — 83036 HEMOGLOBIN GLYCOSYLATED A1C: CPT

## 2025-08-17 PROCEDURE — 2060000000 HC ICU INTERMEDIATE R&B

## 2025-08-17 PROCEDURE — 99232 SBSQ HOSP IP/OBS MODERATE 35: CPT | Performed by: INTERNAL MEDICINE

## 2025-08-17 PROCEDURE — 6370000000 HC RX 637 (ALT 250 FOR IP): Performed by: NURSE PRACTITIONER

## 2025-08-17 PROCEDURE — 2500000003 HC RX 250 WO HCPCS: Performed by: NURSE PRACTITIONER

## 2025-08-17 PROCEDURE — 6360000002 HC RX W HCPCS: Performed by: INTERNAL MEDICINE

## 2025-08-17 PROCEDURE — 84484 ASSAY OF TROPONIN QUANT: CPT

## 2025-08-17 PROCEDURE — 93005 ELECTROCARDIOGRAM TRACING: CPT | Performed by: NURSE PRACTITIONER

## 2025-08-17 PROCEDURE — 93306 TTE W/DOPPLER COMPLETE: CPT | Performed by: INTERNAL MEDICINE

## 2025-08-17 PROCEDURE — 83735 ASSAY OF MAGNESIUM: CPT

## 2025-08-17 RX ADMIN — OXYCODONE HYDROCHLORIDE 5 MG: 5 TABLET ORAL at 03:01

## 2025-08-17 RX ADMIN — BUDESONIDE 500 MCG: 0.5 SUSPENSION RESPIRATORY (INHALATION) at 08:10

## 2025-08-17 RX ADMIN — TAMSULOSIN HYDROCHLORIDE 0.4 MG: 0.4 CAPSULE ORAL at 03:01

## 2025-08-17 RX ADMIN — VENLAFAXINE HYDROCHLORIDE 150 MG: 150 CAPSULE, EXTENDED RELEASE ORAL at 07:53

## 2025-08-17 RX ADMIN — ARIPIPRAZOLE 10 MG: 10 TABLET ORAL at 17:52

## 2025-08-17 RX ADMIN — IPRATROPIUM BROMIDE 0.5 MG: 0.5 SOLUTION RESPIRATORY (INHALATION) at 12:26

## 2025-08-17 RX ADMIN — SODIUM CHLORIDE 125 MG: 9 INJECTION, SOLUTION INTRAVENOUS at 11:48

## 2025-08-17 RX ADMIN — ATORVASTATIN CALCIUM 40 MG: 40 TABLET, FILM COATED ORAL at 07:53

## 2025-08-17 RX ADMIN — IPRATROPIUM BROMIDE 0.5 MG: 0.5 SOLUTION RESPIRATORY (INHALATION) at 15:58

## 2025-08-17 RX ADMIN — TRAZODONE HYDROCHLORIDE 200 MG: 50 TABLET ORAL at 22:22

## 2025-08-17 RX ADMIN — BUDESONIDE 500 MCG: 0.5 SUSPENSION RESPIRATORY (INHALATION) at 20:18

## 2025-08-17 RX ADMIN — IPRATROPIUM BROMIDE 0.5 MG: 0.5 SOLUTION RESPIRATORY (INHALATION) at 20:18

## 2025-08-17 RX ADMIN — IPRATROPIUM BROMIDE 0.5 MG: 0.5 SOLUTION RESPIRATORY (INHALATION) at 08:10

## 2025-08-17 RX ADMIN — AMLODIPINE BESYLATE 10 MG: 10 TABLET ORAL at 22:22

## 2025-08-17 RX ADMIN — OXYCODONE HYDROCHLORIDE 5 MG: 5 TABLET ORAL at 20:51

## 2025-08-17 RX ADMIN — POTASSIUM CHLORIDE 40 MEQ: 1500 TABLET, EXTENDED RELEASE ORAL at 06:48

## 2025-08-17 RX ADMIN — SODIUM CHLORIDE, PRESERVATIVE FREE 40 MG: 5 INJECTION INTRAVENOUS at 04:58

## 2025-08-17 RX ADMIN — OXYCODONE HYDROCHLORIDE 5 MG: 5 TABLET ORAL at 10:24

## 2025-08-17 RX ADMIN — ARIPIPRAZOLE 10 MG: 10 TABLET ORAL at 03:01

## 2025-08-17 RX ADMIN — TAMSULOSIN HYDROCHLORIDE 0.4 MG: 0.4 CAPSULE ORAL at 20:44

## 2025-08-17 RX ADMIN — SODIUM CHLORIDE, PRESERVATIVE FREE 10 ML: 5 INJECTION INTRAVENOUS at 07:53

## 2025-08-17 RX ADMIN — ARFORMOTEROL TARTRATE 15 MCG: 15 SOLUTION RESPIRATORY (INHALATION) at 20:18

## 2025-08-17 RX ADMIN — ARFORMOTEROL TARTRATE 15 MCG: 15 SOLUTION RESPIRATORY (INHALATION) at 08:11

## 2025-08-17 RX ADMIN — SODIUM CHLORIDE, PRESERVATIVE FREE 40 MG: 5 INJECTION INTRAVENOUS at 17:52

## 2025-08-17 ASSESSMENT — PAIN DESCRIPTION - DESCRIPTORS
DESCRIPTORS: DISCOMFORT;ACHING;SHARP
DESCRIPTORS: ACHING

## 2025-08-17 ASSESSMENT — PAIN SCALES - GENERAL
PAINLEVEL_OUTOF10: 4
PAINLEVEL_OUTOF10: 9
PAINLEVEL_OUTOF10: 10
PAINLEVEL_OUTOF10: 4
PAINLEVEL_OUTOF10: 4
PAINLEVEL_OUTOF10: 9

## 2025-08-17 ASSESSMENT — PAIN DESCRIPTION - LOCATION
LOCATION: LEG

## 2025-08-17 ASSESSMENT — PAIN - FUNCTIONAL ASSESSMENT
PAIN_FUNCTIONAL_ASSESSMENT: PREVENTS OR INTERFERES SOME ACTIVE ACTIVITIES AND ADLS
PAIN_FUNCTIONAL_ASSESSMENT: 0-10
PAIN_FUNCTIONAL_ASSESSMENT: 0-10
PAIN_FUNCTIONAL_ASSESSMENT: PREVENTS OR INTERFERES SOME ACTIVE ACTIVITIES AND ADLS
PAIN_FUNCTIONAL_ASSESSMENT: 0-10
PAIN_FUNCTIONAL_ASSESSMENT: PREVENTS OR INTERFERES SOME ACTIVE ACTIVITIES AND ADLS
PAIN_FUNCTIONAL_ASSESSMENT: 0-10

## 2025-08-17 ASSESSMENT — PAIN DESCRIPTION - ORIENTATION
ORIENTATION: RIGHT;LEFT

## 2025-08-18 LAB
ALBUMIN SERPL-MCNC: 3.2 G/DL (ref 3.5–5.2)
ALP SERPL-CCNC: 94 U/L (ref 40–129)
ALT SERPL-CCNC: 11 U/L (ref 0–50)
ANION GAP SERPL CALCULATED.3IONS-SCNC: 9 MMOL/L (ref 7–16)
AST SERPL-CCNC: 14 U/L (ref 0–50)
BASOPHILS # BLD: 0.07 K/UL (ref 0–0.2)
BASOPHILS NFR BLD: 1 % (ref 0–2)
BILIRUB SERPL-MCNC: 0.8 MG/DL (ref 0–1.2)
BUN SERPL-MCNC: 8 MG/DL (ref 8–23)
CALCIUM SERPL-MCNC: 7.8 MG/DL (ref 8.8–10.2)
CHLORIDE SERPL-SCNC: 108 MMOL/L (ref 98–107)
CO2 SERPL-SCNC: 22 MMOL/L (ref 22–29)
CREAT SERPL-MCNC: 0.7 MG/DL (ref 0.7–1.2)
EOSINOPHIL # BLD: 0.28 K/UL (ref 0.05–0.5)
EOSINOPHILS RELATIVE PERCENT: 3 % (ref 0–6)
ERYTHROCYTE [DISTWIDTH] IN BLOOD BY AUTOMATED COUNT: 25.6 % (ref 11.5–15)
GFR, ESTIMATED: >90 ML/MIN/1.73M2
GLUCOSE SERPL-MCNC: 92 MG/DL (ref 74–99)
HCT VFR BLD AUTO: 24.4 % (ref 37–54)
HGB BLD-MCNC: 7 G/DL (ref 12.5–16.5)
IMM GRANULOCYTES # BLD AUTO: 0.07 K/UL (ref 0–0.58)
IMM GRANULOCYTES NFR BLD: 1 % (ref 0–5)
LYMPHOCYTES NFR BLD: 1.23 K/UL (ref 1.5–4)
LYMPHOCYTES RELATIVE PERCENT: 15 % (ref 20–42)
MAGNESIUM SERPL-MCNC: 1.8 MG/DL (ref 1.6–2.4)
MCH RBC QN AUTO: 19.2 PG (ref 26–35)
MCHC RBC AUTO-ENTMCNC: 28.7 G/DL (ref 32–34.5)
MCV RBC AUTO: 66.8 FL (ref 80–99.9)
MONOCYTES NFR BLD: 0.67 K/UL (ref 0.1–0.95)
MONOCYTES NFR BLD: 8 % (ref 2–12)
NEUTROPHILS NFR BLD: 72 % (ref 43–80)
NEUTS SEG NFR BLD: 5.84 K/UL (ref 1.8–7.3)
PATH REV BLD -IMP: NORMAL
PLATELET, FLUORESCENCE: 253 K/UL (ref 130–450)
PMV BLD AUTO: ABNORMAL FL (ref 7–12)
POTASSIUM SERPL-SCNC: 3.5 MMOL/L (ref 3.5–5.1)
PROT SERPL-MCNC: 5.5 G/DL (ref 6.4–8.3)
RBC # BLD AUTO: 3.65 M/UL (ref 3.8–5.8)
RBC # BLD: ABNORMAL 10*6/UL
SODIUM SERPL-SCNC: 139 MMOL/L (ref 136–145)
WBC OTHER # BLD: 8.2 K/UL (ref 4.5–11.5)

## 2025-08-18 PROCEDURE — 2060000000 HC ICU INTERMEDIATE R&B

## 2025-08-18 PROCEDURE — 2580000003 HC RX 258: Performed by: INTERNAL MEDICINE

## 2025-08-18 PROCEDURE — 80053 COMPREHEN METABOLIC PANEL: CPT

## 2025-08-18 PROCEDURE — 2500000003 HC RX 250 WO HCPCS: Performed by: NURSE PRACTITIONER

## 2025-08-18 PROCEDURE — 83735 ASSAY OF MAGNESIUM: CPT

## 2025-08-18 PROCEDURE — 6370000000 HC RX 637 (ALT 250 FOR IP): Performed by: NURSE PRACTITIONER

## 2025-08-18 PROCEDURE — 6360000002 HC RX W HCPCS: Performed by: INTERNAL MEDICINE

## 2025-08-18 PROCEDURE — 6360000002 HC RX W HCPCS: Performed by: NURSE PRACTITIONER

## 2025-08-18 PROCEDURE — 99232 SBSQ HOSP IP/OBS MODERATE 35: CPT | Performed by: STUDENT IN AN ORGANIZED HEALTH CARE EDUCATION/TRAINING PROGRAM

## 2025-08-18 PROCEDURE — 94640 AIRWAY INHALATION TREATMENT: CPT

## 2025-08-18 PROCEDURE — 85025 COMPLETE CBC W/AUTO DIFF WBC: CPT

## 2025-08-18 RX ADMIN — SODIUM CHLORIDE, PRESERVATIVE FREE 40 MG: 5 INJECTION INTRAVENOUS at 17:39

## 2025-08-18 RX ADMIN — BUDESONIDE 500 MCG: 0.5 SUSPENSION RESPIRATORY (INHALATION) at 08:37

## 2025-08-18 RX ADMIN — IPRATROPIUM BROMIDE 0.5 MG: 0.5 SOLUTION RESPIRATORY (INHALATION) at 12:29

## 2025-08-18 RX ADMIN — ONDANSETRON 4 MG: 4 TABLET, ORALLY DISINTEGRATING ORAL at 13:11

## 2025-08-18 RX ADMIN — IPRATROPIUM BROMIDE 0.5 MG: 0.5 SOLUTION RESPIRATORY (INHALATION) at 19:51

## 2025-08-18 RX ADMIN — OXYCODONE HYDROCHLORIDE 5 MG: 5 TABLET ORAL at 07:52

## 2025-08-18 RX ADMIN — VENLAFAXINE HYDROCHLORIDE 150 MG: 150 CAPSULE, EXTENDED RELEASE ORAL at 08:18

## 2025-08-18 RX ADMIN — AMLODIPINE BESYLATE 10 MG: 10 TABLET ORAL at 20:46

## 2025-08-18 RX ADMIN — ARFORMOTEROL TARTRATE 15 MCG: 15 SOLUTION RESPIRATORY (INHALATION) at 08:37

## 2025-08-18 RX ADMIN — ARIPIPRAZOLE 10 MG: 10 TABLET ORAL at 17:39

## 2025-08-18 RX ADMIN — SODIUM CHLORIDE, PRESERVATIVE FREE 40 MG: 5 INJECTION INTRAVENOUS at 04:42

## 2025-08-18 RX ADMIN — OXYCODONE HYDROCHLORIDE 5 MG: 5 TABLET ORAL at 13:10

## 2025-08-18 RX ADMIN — IPRATROPIUM BROMIDE 0.5 MG: 0.5 SOLUTION RESPIRATORY (INHALATION) at 15:36

## 2025-08-18 RX ADMIN — BUDESONIDE 500 MCG: 0.5 SUSPENSION RESPIRATORY (INHALATION) at 19:51

## 2025-08-18 RX ADMIN — TRAZODONE HYDROCHLORIDE 200 MG: 50 TABLET ORAL at 20:46

## 2025-08-18 RX ADMIN — ATORVASTATIN CALCIUM 40 MG: 40 TABLET, FILM COATED ORAL at 08:18

## 2025-08-18 RX ADMIN — SODIUM CHLORIDE 125 MG: 9 INJECTION, SOLUTION INTRAVENOUS at 08:20

## 2025-08-18 RX ADMIN — TAMSULOSIN HYDROCHLORIDE 0.4 MG: 0.4 CAPSULE ORAL at 20:46

## 2025-08-18 RX ADMIN — IPRATROPIUM BROMIDE 0.5 MG: 0.5 SOLUTION RESPIRATORY (INHALATION) at 08:37

## 2025-08-18 RX ADMIN — OXYCODONE HYDROCHLORIDE 5 MG: 5 TABLET ORAL at 20:46

## 2025-08-18 RX ADMIN — ARFORMOTEROL TARTRATE 15 MCG: 15 SOLUTION RESPIRATORY (INHALATION) at 19:51

## 2025-08-18 RX ADMIN — SODIUM CHLORIDE, PRESERVATIVE FREE 10 ML: 5 INJECTION INTRAVENOUS at 08:18

## 2025-08-18 RX ADMIN — SODIUM CHLORIDE, PRESERVATIVE FREE 10 ML: 5 INJECTION INTRAVENOUS at 20:48

## 2025-08-18 ASSESSMENT — PAIN SCALES - GENERAL
PAINLEVEL_OUTOF10: 0
PAINLEVEL_OUTOF10: 7
PAINLEVEL_OUTOF10: 8
PAINLEVEL_OUTOF10: 7

## 2025-08-18 ASSESSMENT — PAIN DESCRIPTION - DESCRIPTORS
DESCRIPTORS: THROBBING
DESCRIPTORS: ACHING;DISCOMFORT
DESCRIPTORS: SHARP;ACHING;DISCOMFORT

## 2025-08-18 ASSESSMENT — PAIN DESCRIPTION - ORIENTATION
ORIENTATION: RIGHT;LEFT
ORIENTATION: LEFT;RIGHT
ORIENTATION: LEFT;RIGHT

## 2025-08-18 ASSESSMENT — PAIN - FUNCTIONAL ASSESSMENT
PAIN_FUNCTIONAL_ASSESSMENT: PREVENTS OR INTERFERES SOME ACTIVE ACTIVITIES AND ADLS
PAIN_FUNCTIONAL_ASSESSMENT: 0-10
PAIN_FUNCTIONAL_ASSESSMENT: ACTIVITIES ARE NOT PREVENTED

## 2025-08-18 ASSESSMENT — PAIN DESCRIPTION - LOCATION
LOCATION: LEG

## 2025-08-19 ENCOUNTER — APPOINTMENT (OUTPATIENT)
Dept: CT IMAGING | Age: 75
DRG: 812 | End: 2025-08-19
Payer: MEDICARE

## 2025-08-19 ENCOUNTER — ANESTHESIA (OUTPATIENT)
Dept: ENDOSCOPY | Age: 75
End: 2025-08-19
Payer: MEDICARE

## 2025-08-19 ENCOUNTER — ANESTHESIA EVENT (OUTPATIENT)
Dept: ENDOSCOPY | Age: 75
End: 2025-08-19
Payer: MEDICARE

## 2025-08-19 LAB
ALBUMIN SERPL-MCNC: 3.2 G/DL (ref 3.5–5.2)
ALP SERPL-CCNC: 92 U/L (ref 40–129)
ALT SERPL-CCNC: 11 U/L (ref 0–50)
ANION GAP SERPL CALCULATED.3IONS-SCNC: 9 MMOL/L (ref 7–16)
AST SERPL-CCNC: 17 U/L (ref 0–50)
BASOPHILS # BLD: 0.09 K/UL (ref 0–0.2)
BASOPHILS NFR BLD: 1 % (ref 0–2)
BILIRUB SERPL-MCNC: 0.6 MG/DL (ref 0–1.2)
BUN SERPL-MCNC: 8 MG/DL (ref 8–23)
CALCIUM SERPL-MCNC: 7.8 MG/DL (ref 8.8–10.2)
CHLORIDE SERPL-SCNC: 108 MMOL/L (ref 98–107)
CO2 SERPL-SCNC: 24 MMOL/L (ref 22–29)
CREAT SERPL-MCNC: 0.7 MG/DL (ref 0.7–1.2)
EKG ATRIAL RATE: 63 BPM
EKG ATRIAL RATE: 75 BPM
EKG P AXIS: -27 DEGREES
EKG P-R INTERVAL: 136 MS
EKG P-R INTERVAL: 174 MS
EKG Q-T INTERVAL: 418 MS
EKG Q-T INTERVAL: 456 MS
EKG QRS DURATION: 118 MS
EKG QRS DURATION: 118 MS
EKG QTC CALCULATION (BAZETT): 466 MS
EKG QTC CALCULATION (BAZETT): 466 MS
EKG R AXIS: -60 DEGREES
EKG R AXIS: -61 DEGREES
EKG T AXIS: 71 DEGREES
EKG T AXIS: 74 DEGREES
EKG VENTRICULAR RATE: 63 BPM
EKG VENTRICULAR RATE: 75 BPM
EOSINOPHIL # BLD: 0.39 K/UL (ref 0.05–0.5)
EOSINOPHILS RELATIVE PERCENT: 5 % (ref 0–6)
ERYTHROCYTE [DISTWIDTH] IN BLOOD BY AUTOMATED COUNT: 26.5 % (ref 11.5–15)
GFR, ESTIMATED: >90 ML/MIN/1.73M2
GLUCOSE SERPL-MCNC: 99 MG/DL (ref 74–99)
HCT VFR BLD AUTO: 26.1 % (ref 37–54)
HGB BLD-MCNC: 7.2 G/DL (ref 12.5–16.5)
IMM GRANULOCYTES # BLD AUTO: 0.06 K/UL (ref 0–0.58)
IMM GRANULOCYTES NFR BLD: 1 % (ref 0–5)
LYMPHOCYTES NFR BLD: 1.36 K/UL (ref 1.5–4)
LYMPHOCYTES RELATIVE PERCENT: 16 % (ref 20–42)
MCH RBC QN AUTO: 19.1 PG (ref 26–35)
MCHC RBC AUTO-ENTMCNC: 27.6 G/DL (ref 32–34.5)
MCV RBC AUTO: 69.2 FL (ref 80–99.9)
MONOCYTES NFR BLD: 0.74 K/UL (ref 0.1–0.95)
MONOCYTES NFR BLD: 9 % (ref 2–12)
NEUTROPHILS NFR BLD: 69 % (ref 43–80)
NEUTS SEG NFR BLD: 5.8 K/UL (ref 1.8–7.3)
PLATELET, FLUORESCENCE: 244 K/UL (ref 130–450)
PMV BLD AUTO: ABNORMAL FL (ref 7–12)
POTASSIUM SERPL-SCNC: 3.6 MMOL/L (ref 3.5–5.1)
PROT SERPL-MCNC: 5.4 G/DL (ref 6.4–8.3)
RBC # BLD AUTO: 3.77 M/UL (ref 3.8–5.8)
RBC # BLD: ABNORMAL 10*6/UL
SODIUM SERPL-SCNC: 140 MMOL/L (ref 136–145)
WBC OTHER # BLD: 8.4 K/UL (ref 4.5–11.5)

## 2025-08-19 PROCEDURE — 6370000000 HC RX 637 (ALT 250 FOR IP): Performed by: NURSE PRACTITIONER

## 2025-08-19 PROCEDURE — 3700000000 HC ANESTHESIA ATTENDED CARE: Performed by: SURGERY

## 2025-08-19 PROCEDURE — 3609012400 HC EGD TRANSORAL BIOPSY SINGLE/MULTIPLE: Performed by: SURGERY

## 2025-08-19 PROCEDURE — 6360000002 HC RX W HCPCS: Performed by: NURSE ANESTHETIST, CERTIFIED REGISTERED

## 2025-08-19 PROCEDURE — 2500000003 HC RX 250 WO HCPCS: Performed by: SURGERY

## 2025-08-19 PROCEDURE — 83735 ASSAY OF MAGNESIUM: CPT

## 2025-08-19 PROCEDURE — 43239 EGD BIOPSY SINGLE/MULTIPLE: CPT | Performed by: SURGERY

## 2025-08-19 PROCEDURE — 6360000002 HC RX W HCPCS: Performed by: NURSE PRACTITIONER

## 2025-08-19 PROCEDURE — 6360000002 HC RX W HCPCS: Performed by: INTERNAL MEDICINE

## 2025-08-19 PROCEDURE — 88305 TISSUE EXAM BY PATHOLOGIST: CPT

## 2025-08-19 PROCEDURE — 3700000001 HC ADD 15 MINUTES (ANESTHESIA): Performed by: SURGERY

## 2025-08-19 PROCEDURE — 2500000003 HC RX 250 WO HCPCS: Performed by: NURSE PRACTITIONER

## 2025-08-19 PROCEDURE — 85025 COMPLETE CBC W/AUTO DIFF WBC: CPT

## 2025-08-19 PROCEDURE — 2709999900 HC NON-CHARGEABLE SUPPLY: Performed by: SURGERY

## 2025-08-19 PROCEDURE — 74175 CTA ABDOMEN W/CONTRAST: CPT

## 2025-08-19 PROCEDURE — 99232 SBSQ HOSP IP/OBS MODERATE 35: CPT | Performed by: STUDENT IN AN ORGANIZED HEALTH CARE EDUCATION/TRAINING PROGRAM

## 2025-08-19 PROCEDURE — 0DB98ZZ EXCISION OF DUODENUM, VIA NATURAL OR ARTIFICIAL OPENING ENDOSCOPIC: ICD-10-PCS | Performed by: SURGERY

## 2025-08-19 PROCEDURE — 88342 IMHCHEM/IMCYTCHM 1ST ANTB: CPT

## 2025-08-19 PROCEDURE — 0DB68ZX EXCISION OF STOMACH, VIA NATURAL OR ARTIFICIAL OPENING ENDOSCOPIC, DIAGNOSTIC: ICD-10-PCS | Performed by: SURGERY

## 2025-08-19 PROCEDURE — 2580000003 HC RX 258: Performed by: INTERNAL MEDICINE

## 2025-08-19 PROCEDURE — 7100000010 HC PHASE II RECOVERY - FIRST 15 MIN: Performed by: SURGERY

## 2025-08-19 PROCEDURE — 7100000011 HC PHASE II RECOVERY - ADDTL 15 MIN: Performed by: SURGERY

## 2025-08-19 PROCEDURE — 2060000000 HC ICU INTERMEDIATE R&B

## 2025-08-19 PROCEDURE — 0DB28ZX EXCISION OF MIDDLE ESOPHAGUS, VIA NATURAL OR ARTIFICIAL OPENING ENDOSCOPIC, DIAGNOSTIC: ICD-10-PCS | Performed by: SURGERY

## 2025-08-19 PROCEDURE — 6370000000 HC RX 637 (ALT 250 FOR IP): Performed by: SURGERY

## 2025-08-19 PROCEDURE — 6360000002 HC RX W HCPCS: Performed by: SURGERY

## 2025-08-19 PROCEDURE — 43251 EGD REMOVE LESION SNARE: CPT | Performed by: SURGERY

## 2025-08-19 PROCEDURE — 93010 ELECTROCARDIOGRAM REPORT: CPT | Performed by: INTERNAL MEDICINE

## 2025-08-19 PROCEDURE — 6360000004 HC RX CONTRAST MEDICATION: Performed by: RADIOLOGY

## 2025-08-19 PROCEDURE — 99222 1ST HOSP IP/OBS MODERATE 55: CPT | Performed by: SURGERY

## 2025-08-19 PROCEDURE — 36415 COLL VENOUS BLD VENIPUNCTURE: CPT

## 2025-08-19 PROCEDURE — 80053 COMPREHEN METABOLIC PANEL: CPT

## 2025-08-19 PROCEDURE — 2580000003 HC RX 258: Performed by: NURSE ANESTHETIST, CERTIFIED REGISTERED

## 2025-08-19 PROCEDURE — 94640 AIRWAY INHALATION TREATMENT: CPT

## 2025-08-19 RX ORDER — PROPOFOL 10 MG/ML
INJECTION, EMULSION INTRAVENOUS
Status: DISCONTINUED | OUTPATIENT
Start: 2025-08-19 | End: 2025-08-19 | Stop reason: SDUPTHER

## 2025-08-19 RX ORDER — SODIUM CHLORIDE 9 MG/ML
INJECTION, SOLUTION INTRAVENOUS
Status: DISCONTINUED | OUTPATIENT
Start: 2025-08-19 | End: 2025-08-19 | Stop reason: SDUPTHER

## 2025-08-19 RX ORDER — IOPAMIDOL 755 MG/ML
75 INJECTION, SOLUTION INTRAVASCULAR
Status: COMPLETED | OUTPATIENT
Start: 2025-08-19 | End: 2025-08-19

## 2025-08-19 RX ADMIN — IOPAMIDOL 75 ML: 755 INJECTION, SOLUTION INTRAVENOUS at 09:03

## 2025-08-19 RX ADMIN — SODIUM CHLORIDE 125 MG: 9 INJECTION, SOLUTION INTRAVENOUS at 07:34

## 2025-08-19 RX ADMIN — SODIUM CHLORIDE: 9 INJECTION, SOLUTION INTRAVENOUS at 10:00

## 2025-08-19 RX ADMIN — TAMSULOSIN HYDROCHLORIDE 0.4 MG: 0.4 CAPSULE ORAL at 19:58

## 2025-08-19 RX ADMIN — TRAZODONE HYDROCHLORIDE 200 MG: 50 TABLET ORAL at 19:58

## 2025-08-19 RX ADMIN — ARFORMOTEROL TARTRATE 15 MCG: 15 SOLUTION RESPIRATORY (INHALATION) at 18:10

## 2025-08-19 RX ADMIN — IPRATROPIUM BROMIDE 0.5 MG: 0.5 SOLUTION RESPIRATORY (INHALATION) at 07:10

## 2025-08-19 RX ADMIN — PROPOFOL 14 MG: 10 INJECTION, EMULSION INTRAVENOUS at 11:01

## 2025-08-19 RX ADMIN — OXYCODONE HYDROCHLORIDE 5 MG: 5 TABLET ORAL at 19:58

## 2025-08-19 RX ADMIN — BUDESONIDE 500 MCG: 0.5 SUSPENSION RESPIRATORY (INHALATION) at 07:10

## 2025-08-19 RX ADMIN — SODIUM CHLORIDE, PRESERVATIVE FREE 40 MG: 5 INJECTION INTRAVENOUS at 05:47

## 2025-08-19 RX ADMIN — IPRATROPIUM BROMIDE 0.5 MG: 0.5 SOLUTION RESPIRATORY (INHALATION) at 18:10

## 2025-08-19 RX ADMIN — AMLODIPINE BESYLATE 10 MG: 10 TABLET ORAL at 19:58

## 2025-08-19 RX ADMIN — SODIUM CHLORIDE, PRESERVATIVE FREE 40 MG: 5 INJECTION INTRAVENOUS at 16:45

## 2025-08-19 RX ADMIN — SODIUM CHLORIDE, PRESERVATIVE FREE 10 ML: 5 INJECTION INTRAVENOUS at 09:11

## 2025-08-19 RX ADMIN — OXYCODONE HYDROCHLORIDE 5 MG: 5 TABLET ORAL at 13:22

## 2025-08-19 RX ADMIN — OXYCODONE HYDROCHLORIDE 5 MG: 5 TABLET ORAL at 07:38

## 2025-08-19 RX ADMIN — IPRATROPIUM BROMIDE 0.5 MG: 0.5 SOLUTION RESPIRATORY (INHALATION) at 14:25

## 2025-08-19 RX ADMIN — BUDESONIDE 500 MCG: 0.5 SUSPENSION RESPIRATORY (INHALATION) at 18:10

## 2025-08-19 RX ADMIN — ARIPIPRAZOLE 10 MG: 10 TABLET ORAL at 16:44

## 2025-08-19 RX ADMIN — SODIUM CHLORIDE, PRESERVATIVE FREE 10 ML: 5 INJECTION INTRAVENOUS at 19:59

## 2025-08-19 RX ADMIN — ARFORMOTEROL TARTRATE 15 MCG: 15 SOLUTION RESPIRATORY (INHALATION) at 07:10

## 2025-08-19 ASSESSMENT — PAIN SCALES - WONG BAKER
WONGBAKER_NUMERICALRESPONSE: NO HURT

## 2025-08-19 ASSESSMENT — PAIN SCALES - GENERAL
PAINLEVEL_OUTOF10: 2
PAINLEVEL_OUTOF10: 0
PAINLEVEL_OUTOF10: 8
PAINLEVEL_OUTOF10: 6
PAINLEVEL_OUTOF10: 7

## 2025-08-19 ASSESSMENT — PAIN DESCRIPTION - DESCRIPTORS
DESCRIPTORS: ACHING
DESCRIPTORS: SORE
DESCRIPTORS: SORE

## 2025-08-19 ASSESSMENT — PAIN DESCRIPTION - ORIENTATION
ORIENTATION: RIGHT;LEFT
ORIENTATION: LEFT;RIGHT;LOWER
ORIENTATION: RIGHT;LEFT

## 2025-08-19 ASSESSMENT — PAIN - FUNCTIONAL ASSESSMENT
PAIN_FUNCTIONAL_ASSESSMENT: 0-10
PAIN_FUNCTIONAL_ASSESSMENT: PREVENTS OR INTERFERES SOME ACTIVE ACTIVITIES AND ADLS
PAIN_FUNCTIONAL_ASSESSMENT: 0-10

## 2025-08-19 ASSESSMENT — ENCOUNTER SYMPTOMS
SHORTNESS OF BREATH: 1
DYSPNEA ACTIVITY LEVEL: AFTER AMBULATING 1 FLIGHT OF STAIRS

## 2025-08-19 ASSESSMENT — PAIN DESCRIPTION - LOCATION
LOCATION: LEG

## 2025-08-19 ASSESSMENT — LIFESTYLE VARIABLES: SMOKING_STATUS: 1

## 2025-08-20 LAB
ALBUMIN SERPL-MCNC: 3.4 G/DL (ref 3.5–5.2)
ALP SERPL-CCNC: 99 U/L (ref 40–129)
ALT SERPL-CCNC: 13 U/L (ref 0–50)
ANION GAP SERPL CALCULATED.3IONS-SCNC: 12 MMOL/L (ref 7–16)
AST SERPL-CCNC: 21 U/L (ref 0–50)
BASOPHILS # BLD: 0.2 K/UL (ref 0–0.2)
BASOPHILS NFR BLD: 3 % (ref 0–2)
BILIRUB SERPL-MCNC: 0.7 MG/DL (ref 0–1.2)
BUN SERPL-MCNC: 6 MG/DL (ref 8–23)
CALCIUM SERPL-MCNC: 8.2 MG/DL (ref 8.8–10.2)
CHLORIDE SERPL-SCNC: 107 MMOL/L (ref 98–107)
CO2 SERPL-SCNC: 22 MMOL/L (ref 22–29)
CREAT SERPL-MCNC: 0.8 MG/DL (ref 0.7–1.2)
EOSINOPHIL # BLD: 0.34 K/UL (ref 0.05–0.5)
EOSINOPHILS RELATIVE PERCENT: 5 % (ref 0–6)
ERYTHROCYTE [DISTWIDTH] IN BLOOD BY AUTOMATED COUNT: 28.8 % (ref 11.5–15)
GFR, ESTIMATED: >90 ML/MIN/1.73M2
GLUCOSE SERPL-MCNC: 96 MG/DL (ref 74–99)
HCT VFR BLD AUTO: 29.8 % (ref 37–54)
HGB BLD-MCNC: 7.9 G/DL (ref 12.5–16.5)
LYMPHOCYTES NFR BLD: 1.43 K/UL (ref 1.5–4)
LYMPHOCYTES RELATIVE PERCENT: 21 % (ref 20–42)
MCH RBC QN AUTO: 19.5 PG (ref 26–35)
MCHC RBC AUTO-ENTMCNC: 26.5 G/DL (ref 32–34.5)
MCV RBC AUTO: 73.6 FL (ref 80–99.9)
MONOCYTES NFR BLD: 0.07 K/UL (ref 0.1–0.95)
MONOCYTES NFR BLD: 1 % (ref 2–12)
NEUTROPHILS NFR BLD: 70 % (ref 43–80)
NEUTS SEG NFR BLD: 4.76 K/UL (ref 1.8–7.3)
PLATELET, FLUORESCENCE: 259 K/UL (ref 130–450)
PMV BLD AUTO: ABNORMAL FL (ref 7–12)
POTASSIUM SERPL-SCNC: 3.7 MMOL/L (ref 3.5–5.1)
PROT SERPL-MCNC: 5.9 G/DL (ref 6.4–8.3)
RBC # BLD AUTO: 4.05 M/UL (ref 3.8–5.8)
RBC # BLD: ABNORMAL 10*6/UL
SODIUM SERPL-SCNC: 141 MMOL/L (ref 136–145)
WBC OTHER # BLD: 6.8 K/UL (ref 4.5–11.5)

## 2025-08-20 PROCEDURE — 6360000002 HC RX W HCPCS: Performed by: SURGERY

## 2025-08-20 PROCEDURE — 2060000000 HC ICU INTERMEDIATE R&B

## 2025-08-20 PROCEDURE — 36415 COLL VENOUS BLD VENIPUNCTURE: CPT

## 2025-08-20 PROCEDURE — 6370000000 HC RX 637 (ALT 250 FOR IP)

## 2025-08-20 PROCEDURE — 97161 PT EVAL LOW COMPLEX 20 MIN: CPT

## 2025-08-20 PROCEDURE — 2580000003 HC RX 258: Performed by: SURGERY

## 2025-08-20 PROCEDURE — 94640 AIRWAY INHALATION TREATMENT: CPT

## 2025-08-20 PROCEDURE — 6370000000 HC RX 637 (ALT 250 FOR IP): Performed by: SURGERY

## 2025-08-20 PROCEDURE — 80053 COMPREHEN METABOLIC PANEL: CPT

## 2025-08-20 PROCEDURE — 85025 COMPLETE CBC W/AUTO DIFF WBC: CPT

## 2025-08-20 PROCEDURE — 97165 OT EVAL LOW COMPLEX 30 MIN: CPT

## 2025-08-20 PROCEDURE — 99232 SBSQ HOSP IP/OBS MODERATE 35: CPT | Performed by: STUDENT IN AN ORGANIZED HEALTH CARE EDUCATION/TRAINING PROGRAM

## 2025-08-20 PROCEDURE — 99232 SBSQ HOSP IP/OBS MODERATE 35: CPT | Performed by: SURGERY

## 2025-08-20 PROCEDURE — 2500000003 HC RX 250 WO HCPCS: Performed by: SURGERY

## 2025-08-20 RX ORDER — BISACODYL 5 MG/1
10 TABLET, DELAYED RELEASE ORAL ONCE
Status: COMPLETED | OUTPATIENT
Start: 2025-08-20 | End: 2025-08-20

## 2025-08-20 RX ORDER — POLYETHYLENE GLYCOL 3350 17 G/17G
238 POWDER ORAL ONCE
Status: COMPLETED | OUTPATIENT
Start: 2025-08-20 | End: 2025-08-20

## 2025-08-20 RX ADMIN — BUDESONIDE 500 MCG: 0.5 SUSPENSION RESPIRATORY (INHALATION) at 07:59

## 2025-08-20 RX ADMIN — TRAZODONE HYDROCHLORIDE 200 MG: 50 TABLET ORAL at 20:39

## 2025-08-20 RX ADMIN — ATORVASTATIN CALCIUM 40 MG: 40 TABLET, FILM COATED ORAL at 07:43

## 2025-08-20 RX ADMIN — SODIUM CHLORIDE 125 MG: 9 INJECTION, SOLUTION INTRAVENOUS at 07:47

## 2025-08-20 RX ADMIN — SODIUM CHLORIDE, PRESERVATIVE FREE 10 ML: 5 INJECTION INTRAVENOUS at 07:43

## 2025-08-20 RX ADMIN — TAMSULOSIN HYDROCHLORIDE 0.4 MG: 0.4 CAPSULE ORAL at 20:34

## 2025-08-20 RX ADMIN — POLYETHYLENE GLYCOL 3350 238 G: 17 POWDER, FOR SOLUTION ORAL at 11:02

## 2025-08-20 RX ADMIN — IPRATROPIUM BROMIDE 0.5 MG: 0.5 SOLUTION RESPIRATORY (INHALATION) at 07:59

## 2025-08-20 RX ADMIN — VENLAFAXINE HYDROCHLORIDE 150 MG: 150 CAPSULE, EXTENDED RELEASE ORAL at 07:43

## 2025-08-20 RX ADMIN — SODIUM CHLORIDE, PRESERVATIVE FREE 40 MG: 5 INJECTION INTRAVENOUS at 06:13

## 2025-08-20 RX ADMIN — SODIUM CHLORIDE, PRESERVATIVE FREE 40 MG: 5 INJECTION INTRAVENOUS at 16:49

## 2025-08-20 RX ADMIN — SODIUM CHLORIDE, PRESERVATIVE FREE 10 ML: 5 INJECTION INTRAVENOUS at 20:33

## 2025-08-20 RX ADMIN — BUDESONIDE 500 MCG: 0.5 SUSPENSION RESPIRATORY (INHALATION) at 19:10

## 2025-08-20 RX ADMIN — BISACODYL 10 MG: 5 TABLET, COATED ORAL at 16:49

## 2025-08-20 RX ADMIN — IPRATROPIUM BROMIDE 0.5 MG: 0.5 SOLUTION RESPIRATORY (INHALATION) at 16:00

## 2025-08-20 RX ADMIN — ARFORMOTEROL TARTRATE 15 MCG: 15 SOLUTION RESPIRATORY (INHALATION) at 07:59

## 2025-08-20 RX ADMIN — IPRATROPIUM BROMIDE 0.5 MG: 0.5 SOLUTION RESPIRATORY (INHALATION) at 19:10

## 2025-08-20 RX ADMIN — ONDANSETRON 4 MG: 2 INJECTION, SOLUTION INTRAMUSCULAR; INTRAVENOUS at 16:55

## 2025-08-20 RX ADMIN — OXYCODONE HYDROCHLORIDE 5 MG: 5 TABLET ORAL at 07:43

## 2025-08-20 RX ADMIN — AMLODIPINE BESYLATE 10 MG: 10 TABLET ORAL at 20:34

## 2025-08-20 RX ADMIN — OXYCODONE HYDROCHLORIDE 5 MG: 5 TABLET ORAL at 15:56

## 2025-08-20 RX ADMIN — ARIPIPRAZOLE 10 MG: 10 TABLET ORAL at 16:49

## 2025-08-20 RX ADMIN — ARFORMOTEROL TARTRATE 15 MCG: 15 SOLUTION RESPIRATORY (INHALATION) at 19:10

## 2025-08-20 ASSESSMENT — PAIN SCALES - GENERAL
PAINLEVEL_OUTOF10: 9
PAINLEVEL_OUTOF10: 0

## 2025-08-20 ASSESSMENT — PAIN - FUNCTIONAL ASSESSMENT
PAIN_FUNCTIONAL_ASSESSMENT: 0-10

## 2025-08-20 ASSESSMENT — PAIN DESCRIPTION - LOCATION: LOCATION: LEG

## 2025-08-20 ASSESSMENT — PAIN DESCRIPTION - ORIENTATION: ORIENTATION: RIGHT;LEFT

## 2025-08-20 ASSESSMENT — PAIN DESCRIPTION - DESCRIPTORS: DESCRIPTORS: ACHING;SORE;TENDER

## 2025-08-21 ENCOUNTER — ANESTHESIA (OUTPATIENT)
Dept: ENDOSCOPY | Age: 75
End: 2025-08-21
Payer: MEDICARE

## 2025-08-21 ENCOUNTER — ANESTHESIA EVENT (OUTPATIENT)
Dept: ENDOSCOPY | Age: 75
End: 2025-08-21
Payer: MEDICARE

## 2025-08-21 ENCOUNTER — APPOINTMENT (OUTPATIENT)
Dept: MRI IMAGING | Age: 75
DRG: 812 | End: 2025-08-21
Payer: MEDICARE

## 2025-08-21 LAB
ANION GAP SERPL CALCULATED.3IONS-SCNC: 13 MMOL/L (ref 7–16)
BASOPHILS # BLD: 0.29 K/UL (ref 0–0.2)
BASOPHILS NFR BLD: 4 % (ref 0–2)
BUN SERPL-MCNC: 6 MG/DL (ref 8–23)
CALCIUM SERPL-MCNC: 8.4 MG/DL (ref 8.8–10.2)
CHLORIDE SERPL-SCNC: 106 MMOL/L (ref 98–107)
CO2 SERPL-SCNC: 23 MMOL/L (ref 22–29)
CREAT SERPL-MCNC: 0.7 MG/DL (ref 0.7–1.2)
EOSINOPHIL # BLD: 0.07 K/UL (ref 0.05–0.5)
EOSINOPHILS RELATIVE PERCENT: 1 % (ref 0–6)
ERYTHROCYTE [DISTWIDTH] IN BLOOD BY AUTOMATED COUNT: 29.7 % (ref 11.5–15)
GFR, ESTIMATED: >90 ML/MIN/1.73M2
GLUCOSE SERPL-MCNC: 81 MG/DL (ref 74–99)
HCT VFR BLD AUTO: 28 % (ref 37–54)
HGB BLD-MCNC: 7.8 G/DL (ref 12.5–16.5)
LYMPHOCYTES NFR BLD: 0.29 K/UL (ref 1.5–4)
LYMPHOCYTES RELATIVE PERCENT: 4 % (ref 20–42)
MAGNESIUM SERPL-MCNC: 1.9 MG/DL (ref 1.6–2.4)
MCH RBC QN AUTO: 19.5 PG (ref 26–35)
MCHC RBC AUTO-ENTMCNC: 27.9 G/DL (ref 32–34.5)
MCV RBC AUTO: 70 FL (ref 80–99.9)
MONOCYTES NFR BLD: 0.43 K/UL (ref 0.1–0.95)
MONOCYTES NFR BLD: 5 % (ref 2–12)
NEUTROPHILS NFR BLD: 87 % (ref 43–80)
NEUTS SEG NFR BLD: 7.02 K/UL (ref 1.8–7.3)
PLATELET, FLUORESCENCE: 261 K/UL (ref 130–450)
PMV BLD AUTO: ABNORMAL FL (ref 7–12)
POTASSIUM SERPL-SCNC: 3.5 MMOL/L (ref 3.5–5.1)
RBC # BLD AUTO: 4 M/UL (ref 3.8–5.8)
RBC # BLD: ABNORMAL 10*6/UL
SODIUM SERPL-SCNC: 142 MMOL/L (ref 136–145)
WBC OTHER # BLD: 8.1 K/UL (ref 4.5–11.5)

## 2025-08-21 PROCEDURE — 45385 COLONOSCOPY W/LESION REMOVAL: CPT | Performed by: SURGERY

## 2025-08-21 PROCEDURE — 88305 TISSUE EXAM BY PATHOLOGIST: CPT

## 2025-08-21 PROCEDURE — 0DBL8ZZ EXCISION OF TRANSVERSE COLON, VIA NATURAL OR ARTIFICIAL OPENING ENDOSCOPIC: ICD-10-PCS | Performed by: SURGERY

## 2025-08-21 PROCEDURE — 36415 COLL VENOUS BLD VENIPUNCTURE: CPT

## 2025-08-21 PROCEDURE — 3700000001 HC ADD 15 MINUTES (ANESTHESIA): Performed by: SURGERY

## 2025-08-21 PROCEDURE — 3609010600 HC COLONOSCOPY POLYPECTOMY SNARE/COLD BIOPSY: Performed by: SURGERY

## 2025-08-21 PROCEDURE — 7100000010 HC PHASE II RECOVERY - FIRST 15 MIN: Performed by: SURGERY

## 2025-08-21 PROCEDURE — 83735 ASSAY OF MAGNESIUM: CPT

## 2025-08-21 PROCEDURE — 2500000003 HC RX 250 WO HCPCS: Performed by: SURGERY

## 2025-08-21 PROCEDURE — 6360000002 HC RX W HCPCS: Performed by: NURSE ANESTHETIST, CERTIFIED REGISTERED

## 2025-08-21 PROCEDURE — 94640 AIRWAY INHALATION TREATMENT: CPT

## 2025-08-21 PROCEDURE — 6360000002 HC RX W HCPCS: Performed by: SURGERY

## 2025-08-21 PROCEDURE — 85025 COMPLETE CBC W/AUTO DIFF WBC: CPT

## 2025-08-21 PROCEDURE — 2060000000 HC ICU INTERMEDIATE R&B

## 2025-08-21 PROCEDURE — 7100000011 HC PHASE II RECOVERY - ADDTL 15 MIN: Performed by: SURGERY

## 2025-08-21 PROCEDURE — 74183 MRI ABD W/O CNTR FLWD CNTR: CPT

## 2025-08-21 PROCEDURE — 2709999900 HC NON-CHARGEABLE SUPPLY: Performed by: SURGERY

## 2025-08-21 PROCEDURE — 2580000003 HC RX 258: Performed by: SURGERY

## 2025-08-21 PROCEDURE — 6370000000 HC RX 637 (ALT 250 FOR IP): Performed by: SURGERY

## 2025-08-21 PROCEDURE — 6360000004 HC RX CONTRAST MEDICATION: Performed by: RADIOLOGY

## 2025-08-21 PROCEDURE — 99232 SBSQ HOSP IP/OBS MODERATE 35: CPT | Performed by: STUDENT IN AN ORGANIZED HEALTH CARE EDUCATION/TRAINING PROGRAM

## 2025-08-21 PROCEDURE — 1200000000 HC SEMI PRIVATE

## 2025-08-21 PROCEDURE — A9581 GADOXETATE DISODIUM INJ: HCPCS | Performed by: RADIOLOGY

## 2025-08-21 PROCEDURE — 80048 BASIC METABOLIC PNL TOTAL CA: CPT

## 2025-08-21 PROCEDURE — 3700000000 HC ANESTHESIA ATTENDED CARE: Performed by: SURGERY

## 2025-08-21 RX ORDER — DROPERIDOL 2.5 MG/ML
0.62 INJECTION, SOLUTION INTRAMUSCULAR; INTRAVENOUS
Status: DISCONTINUED | OUTPATIENT
Start: 2025-08-21 | End: 2025-08-21 | Stop reason: HOSPADM

## 2025-08-21 RX ORDER — LABETALOL HYDROCHLORIDE 5 MG/ML
10 INJECTION, SOLUTION INTRAVENOUS
Status: DISCONTINUED | OUTPATIENT
Start: 2025-08-21 | End: 2025-08-21 | Stop reason: HOSPADM

## 2025-08-21 RX ORDER — PROPOFOL 10 MG/ML
INJECTION, EMULSION INTRAVENOUS
Status: DISCONTINUED | OUTPATIENT
Start: 2025-08-21 | End: 2025-08-21 | Stop reason: SDUPTHER

## 2025-08-21 RX ORDER — HYDRALAZINE HYDROCHLORIDE 20 MG/ML
10 INJECTION INTRAMUSCULAR; INTRAVENOUS
Status: DISCONTINUED | OUTPATIENT
Start: 2025-08-21 | End: 2025-08-21 | Stop reason: HOSPADM

## 2025-08-21 RX ORDER — SODIUM CHLORIDE 9 MG/ML
INJECTION, SOLUTION INTRAVENOUS PRN
Status: DISCONTINUED | OUTPATIENT
Start: 2025-08-21 | End: 2025-08-21 | Stop reason: HOSPADM

## 2025-08-21 RX ORDER — SODIUM CHLORIDE 0.9 % (FLUSH) 0.9 %
5-40 SYRINGE (ML) INJECTION EVERY 12 HOURS SCHEDULED
Status: DISCONTINUED | OUTPATIENT
Start: 2025-08-21 | End: 2025-08-21 | Stop reason: HOSPADM

## 2025-08-21 RX ORDER — LIDOCAINE HYDROCHLORIDE 20 MG/ML
INJECTION, SOLUTION EPIDURAL; INFILTRATION; INTRACAUDAL; PERINEURAL
Status: DISCONTINUED | OUTPATIENT
Start: 2025-08-21 | End: 2025-08-21 | Stop reason: SDUPTHER

## 2025-08-21 RX ORDER — PROCHLORPERAZINE EDISYLATE 5 MG/ML
5 INJECTION INTRAMUSCULAR; INTRAVENOUS
Status: DISCONTINUED | OUTPATIENT
Start: 2025-08-21 | End: 2025-08-21 | Stop reason: HOSPADM

## 2025-08-21 RX ORDER — SODIUM CHLORIDE 0.9 % (FLUSH) 0.9 %
5-40 SYRINGE (ML) INJECTION PRN
Status: DISCONTINUED | OUTPATIENT
Start: 2025-08-21 | End: 2025-08-21 | Stop reason: HOSPADM

## 2025-08-21 RX ADMIN — IPRATROPIUM BROMIDE 0.5 MG: 0.5 SOLUTION RESPIRATORY (INHALATION) at 19:51

## 2025-08-21 RX ADMIN — ARIPIPRAZOLE 10 MG: 10 TABLET ORAL at 22:04

## 2025-08-21 RX ADMIN — IPRATROPIUM BROMIDE 0.5 MG: 0.5 SOLUTION RESPIRATORY (INHALATION) at 15:25

## 2025-08-21 RX ADMIN — OXYCODONE HYDROCHLORIDE 5 MG: 5 TABLET ORAL at 15:01

## 2025-08-21 RX ADMIN — ATORVASTATIN CALCIUM 40 MG: 40 TABLET, FILM COATED ORAL at 08:29

## 2025-08-21 RX ADMIN — SODIUM CHLORIDE 125 MG: 9 INJECTION, SOLUTION INTRAVENOUS at 08:36

## 2025-08-21 RX ADMIN — ARFORMOTEROL TARTRATE 15 MCG: 15 SOLUTION RESPIRATORY (INHALATION) at 19:51

## 2025-08-21 RX ADMIN — SODIUM CHLORIDE, PRESERVATIVE FREE 10 ML: 5 INJECTION INTRAVENOUS at 08:30

## 2025-08-21 RX ADMIN — SODIUM CHLORIDE, PRESERVATIVE FREE 10 ML: 5 INJECTION INTRAVENOUS at 19:32

## 2025-08-21 RX ADMIN — SODIUM CHLORIDE, PRESERVATIVE FREE 40 MG: 5 INJECTION INTRAVENOUS at 05:36

## 2025-08-21 RX ADMIN — SODIUM CHLORIDE, PRESERVATIVE FREE 40 MG: 5 INJECTION INTRAVENOUS at 17:53

## 2025-08-21 RX ADMIN — ARFORMOTEROL TARTRATE 15 MCG: 15 SOLUTION RESPIRATORY (INHALATION) at 07:27

## 2025-08-21 RX ADMIN — IPRATROPIUM BROMIDE 0.5 MG: 0.5 SOLUTION RESPIRATORY (INHALATION) at 12:52

## 2025-08-21 RX ADMIN — IPRATROPIUM BROMIDE 0.5 MG: 0.5 SOLUTION RESPIRATORY (INHALATION) at 07:27

## 2025-08-21 RX ADMIN — LIDOCAINE HYDROCHLORIDE 5 ML: 20 INJECTION, SOLUTION EPIDURAL; INFILTRATION; INTRACAUDAL; PERINEURAL at 11:07

## 2025-08-21 RX ADMIN — PROPOFOL 250 MG: 10 INJECTION, EMULSION INTRAVENOUS at 11:07

## 2025-08-21 RX ADMIN — TRAZODONE HYDROCHLORIDE 200 MG: 50 TABLET ORAL at 20:53

## 2025-08-21 RX ADMIN — SODIUM CHLORIDE: 9 INJECTION, SOLUTION INTRAVENOUS at 10:48

## 2025-08-21 RX ADMIN — OXYCODONE HYDROCHLORIDE 5 MG: 5 TABLET ORAL at 08:29

## 2025-08-21 RX ADMIN — BUDESONIDE 500 MCG: 0.5 SUSPENSION RESPIRATORY (INHALATION) at 07:27

## 2025-08-21 RX ADMIN — VENLAFAXINE HYDROCHLORIDE 150 MG: 150 CAPSULE, EXTENDED RELEASE ORAL at 08:29

## 2025-08-21 RX ADMIN — AMLODIPINE BESYLATE 10 MG: 10 TABLET ORAL at 20:53

## 2025-08-21 RX ADMIN — TAMSULOSIN HYDROCHLORIDE 0.4 MG: 0.4 CAPSULE ORAL at 20:54

## 2025-08-21 RX ADMIN — GADOXETATE DISODIUM 8 ML: 181.43 INJECTION, SOLUTION INTRAVENOUS at 17:41

## 2025-08-21 RX ADMIN — BUDESONIDE 500 MCG: 0.5 SUSPENSION RESPIRATORY (INHALATION) at 19:51

## 2025-08-21 RX ADMIN — OXYCODONE HYDROCHLORIDE 5 MG: 5 TABLET ORAL at 20:53

## 2025-08-21 ASSESSMENT — PAIN DESCRIPTION - DESCRIPTORS
DESCRIPTORS: ACHING
DESCRIPTORS: ACHING;DISCOMFORT;TENDER
DESCRIPTORS: ACHING

## 2025-08-21 ASSESSMENT — PAIN DESCRIPTION - LOCATION
LOCATION: LEG

## 2025-08-21 ASSESSMENT — PAIN - FUNCTIONAL ASSESSMENT
PAIN_FUNCTIONAL_ASSESSMENT: PREVENTS OR INTERFERES SOME ACTIVE ACTIVITIES AND ADLS
PAIN_FUNCTIONAL_ASSESSMENT: 0-10
PAIN_FUNCTIONAL_ASSESSMENT: ACTIVITIES ARE NOT PREVENTED
PAIN_FUNCTIONAL_ASSESSMENT: 0-10
PAIN_FUNCTIONAL_ASSESSMENT: 0-10
PAIN_FUNCTIONAL_ASSESSMENT: ACTIVITIES ARE NOT PREVENTED
PAIN_FUNCTIONAL_ASSESSMENT: 0-10
PAIN_FUNCTIONAL_ASSESSMENT: 0-10

## 2025-08-21 ASSESSMENT — ENCOUNTER SYMPTOMS
DYSPNEA ACTIVITY LEVEL: AFTER AMBULATING 1 FLIGHT OF STAIRS
SHORTNESS OF BREATH: 1

## 2025-08-21 ASSESSMENT — PAIN DESCRIPTION - ORIENTATION
ORIENTATION: RIGHT;LEFT
ORIENTATION: RIGHT;LEFT
ORIENTATION: RIGHT

## 2025-08-21 ASSESSMENT — PAIN SCALES - GENERAL
PAINLEVEL_OUTOF10: 9
PAINLEVEL_OUTOF10: 3
PAINLEVEL_OUTOF10: 7
PAINLEVEL_OUTOF10: 0
PAINLEVEL_OUTOF10: 9
PAINLEVEL_OUTOF10: 0

## 2025-08-21 ASSESSMENT — PAIN DESCRIPTION - PAIN TYPE: TYPE: CHRONIC PAIN

## 2025-08-21 ASSESSMENT — LIFESTYLE VARIABLES: SMOKING_STATUS: 1

## 2025-08-21 ASSESSMENT — PAIN DESCRIPTION - FREQUENCY: FREQUENCY: INTERMITTENT

## 2025-08-21 ASSESSMENT — PAIN DESCRIPTION - ONSET: ONSET: PROGRESSIVE

## 2025-08-21 ASSESSMENT — PAIN SCALES - WONG BAKER
WONGBAKER_NUMERICALRESPONSE: HURTS A LITTLE BIT
WONGBAKER_NUMERICALRESPONSE: HURTS EVEN MORE

## 2025-08-22 ENCOUNTER — APPOINTMENT (OUTPATIENT)
Dept: CT IMAGING | Age: 75
DRG: 812 | End: 2025-08-22
Payer: MEDICARE

## 2025-08-22 PROBLEM — E44.0 MODERATE PROTEIN-CALORIE MALNUTRITION: Status: ACTIVE | Noted: 2025-08-22

## 2025-08-22 LAB
ALBUMIN SERPL-MCNC: 3.2 G/DL (ref 3.5–5.2)
ALP SERPL-CCNC: 93 U/L (ref 40–129)
ALT SERPL-CCNC: 12 U/L (ref 0–50)
ANION GAP SERPL CALCULATED.3IONS-SCNC: 10 MMOL/L (ref 7–16)
AST SERPL-CCNC: 14 U/L (ref 0–50)
BASOPHILS # BLD: 0 K/UL (ref 0–0.2)
BASOPHILS NFR BLD: 0 % (ref 0–2)
BILIRUB SERPL-MCNC: 0.6 MG/DL (ref 0–1.2)
BUN SERPL-MCNC: 7 MG/DL (ref 8–23)
CALCIUM SERPL-MCNC: 8.2 MG/DL (ref 8.8–10.2)
CHLORIDE SERPL-SCNC: 107 MMOL/L (ref 98–107)
CO2 SERPL-SCNC: 24 MMOL/L (ref 22–29)
CREAT SERPL-MCNC: 0.8 MG/DL (ref 0.7–1.2)
CRP SERPL HS-MCNC: 27.3 MG/L (ref 0–5)
EOSINOPHIL # BLD: 0.3 K/UL (ref 0.05–0.5)
EOSINOPHILS RELATIVE PERCENT: 4 % (ref 0–6)
ERYTHROCYTE [DISTWIDTH] IN BLOOD BY AUTOMATED COUNT: 31.1 % (ref 11.5–15)
ERYTHROCYTE [SEDIMENTATION RATE] IN BLOOD BY WESTERGREN METHOD: 8 MM/HR (ref 0–15)
GFR, ESTIMATED: >90 ML/MIN/1.73M2
GLUCOSE SERPL-MCNC: 104 MG/DL (ref 74–99)
HAV IGM SERPL QL IA: NONREACTIVE
HBV CORE IGM SERPL QL IA: NONREACTIVE
HBV SURFACE AG SERPL QL IA: NONREACTIVE
HCT VFR BLD AUTO: 28.4 % (ref 37–54)
HCV AB SERPL QL IA: NONREACTIVE
HGB BLD-MCNC: 8 G/DL (ref 12.5–16.5)
HIV 1+2 AB+HIV1 P24 AG SERPL QL IA: NONREACTIVE
LYMPHOCYTES NFR BLD: 0.6 K/UL (ref 1.5–4)
LYMPHOCYTES RELATIVE PERCENT: 7 % (ref 20–42)
MAGNESIUM SERPL-MCNC: 1.9 MG/DL (ref 1.6–2.4)
MCH RBC QN AUTO: 20.3 PG (ref 26–35)
MCHC RBC AUTO-ENTMCNC: 28.2 G/DL (ref 32–34.5)
MCV RBC AUTO: 71.9 FL (ref 80–99.9)
MONOCYTES NFR BLD: 0 % (ref 2–12)
MONOCYTES NFR BLD: 0 K/UL (ref 0.1–0.95)
NEUTROPHILS NFR BLD: 89 % (ref 43–80)
NEUTS SEG NFR BLD: 7.2 K/UL (ref 1.8–7.3)
PLATELET, FLUORESCENCE: 244 K/UL (ref 130–450)
PMV BLD AUTO: ABNORMAL FL (ref 7–12)
POTASSIUM SERPL-SCNC: 3.5 MMOL/L (ref 3.5–5.1)
PROCALCITONIN SERPL-MCNC: <0.02 NG/ML (ref 0–0.08)
PROT SERPL-MCNC: 5.5 G/DL (ref 6.4–8.3)
RBC # BLD AUTO: 3.95 M/UL (ref 3.8–5.8)
RBC # BLD: ABNORMAL 10*6/UL
SODIUM SERPL-SCNC: 141 MMOL/L (ref 136–145)
WBC OTHER # BLD: 8.1 K/UL (ref 4.5–11.5)

## 2025-08-22 PROCEDURE — 85652 RBC SED RATE AUTOMATED: CPT

## 2025-08-22 PROCEDURE — 71250 CT THORAX DX C-: CPT

## 2025-08-22 PROCEDURE — 80074 ACUTE HEPATITIS PANEL: CPT

## 2025-08-22 PROCEDURE — 87389 HIV-1 AG W/HIV-1&-2 AB AG IA: CPT

## 2025-08-22 PROCEDURE — 6360000002 HC RX W HCPCS: Performed by: SURGERY

## 2025-08-22 PROCEDURE — 99222 1ST HOSP IP/OBS MODERATE 55: CPT | Performed by: INTERNAL MEDICINE

## 2025-08-22 PROCEDURE — 6360000004 HC RX CONTRAST MEDICATION: Performed by: RADIOLOGY

## 2025-08-22 PROCEDURE — 1200000000 HC SEMI PRIVATE

## 2025-08-22 PROCEDURE — 84145 PROCALCITONIN (PCT): CPT

## 2025-08-22 PROCEDURE — 6370000000 HC RX 637 (ALT 250 FOR IP): Performed by: SURGERY

## 2025-08-22 PROCEDURE — 94640 AIRWAY INHALATION TREATMENT: CPT

## 2025-08-22 PROCEDURE — 86140 C-REACTIVE PROTEIN: CPT

## 2025-08-22 PROCEDURE — 99232 SBSQ HOSP IP/OBS MODERATE 35: CPT | Performed by: INTERNAL MEDICINE

## 2025-08-22 PROCEDURE — 2500000003 HC RX 250 WO HCPCS: Performed by: SURGERY

## 2025-08-22 PROCEDURE — 71260 CT THORAX DX C+: CPT

## 2025-08-22 PROCEDURE — 82542 COL CHROMOTOGRAPHY QUAL/QUAN: CPT

## 2025-08-22 RX ORDER — IOPAMIDOL 755 MG/ML
75 INJECTION, SOLUTION INTRAVASCULAR
Status: COMPLETED | OUTPATIENT
Start: 2025-08-22 | End: 2025-08-22

## 2025-08-22 RX ADMIN — IPRATROPIUM BROMIDE 0.5 MG: 0.5 SOLUTION RESPIRATORY (INHALATION) at 12:50

## 2025-08-22 RX ADMIN — TAMSULOSIN HYDROCHLORIDE 0.4 MG: 0.4 CAPSULE ORAL at 21:39

## 2025-08-22 RX ADMIN — SODIUM CHLORIDE, PRESERVATIVE FREE 10 ML: 5 INJECTION INTRAVENOUS at 08:30

## 2025-08-22 RX ADMIN — OXYCODONE HYDROCHLORIDE 5 MG: 5 TABLET ORAL at 21:39

## 2025-08-22 RX ADMIN — IOPAMIDOL 75 ML: 755 INJECTION, SOLUTION INTRAVENOUS at 13:19

## 2025-08-22 RX ADMIN — SODIUM CHLORIDE, PRESERVATIVE FREE 40 MG: 5 INJECTION INTRAVENOUS at 18:21

## 2025-08-22 RX ADMIN — VENLAFAXINE HYDROCHLORIDE 150 MG: 150 CAPSULE, EXTENDED RELEASE ORAL at 08:30

## 2025-08-22 RX ADMIN — SODIUM CHLORIDE, PRESERVATIVE FREE 10 ML: 5 INJECTION INTRAVENOUS at 21:31

## 2025-08-22 RX ADMIN — ARFORMOTEROL TARTRATE 15 MCG: 15 SOLUTION RESPIRATORY (INHALATION) at 08:36

## 2025-08-22 RX ADMIN — ARIPIPRAZOLE 10 MG: 10 TABLET ORAL at 18:21

## 2025-08-22 RX ADMIN — BUDESONIDE 500 MCG: 0.5 SUSPENSION RESPIRATORY (INHALATION) at 08:36

## 2025-08-22 RX ADMIN — TRAZODONE HYDROCHLORIDE 200 MG: 50 TABLET ORAL at 21:39

## 2025-08-22 RX ADMIN — SODIUM CHLORIDE, PRESERVATIVE FREE 40 MG: 5 INJECTION INTRAVENOUS at 06:00

## 2025-08-22 RX ADMIN — IPRATROPIUM BROMIDE 0.5 MG: 0.5 SOLUTION RESPIRATORY (INHALATION) at 15:54

## 2025-08-22 RX ADMIN — ATORVASTATIN CALCIUM 40 MG: 40 TABLET, FILM COATED ORAL at 08:30

## 2025-08-22 RX ADMIN — IPRATROPIUM BROMIDE 0.5 MG: 0.5 SOLUTION RESPIRATORY (INHALATION) at 08:36

## 2025-08-22 RX ADMIN — OXYCODONE HYDROCHLORIDE 5 MG: 5 TABLET ORAL at 09:53

## 2025-08-22 ASSESSMENT — PAIN DESCRIPTION - ONSET: ONSET: PROGRESSIVE

## 2025-08-22 ASSESSMENT — PAIN SCALES - GENERAL
PAINLEVEL_OUTOF10: 9
PAINLEVEL_OUTOF10: 5
PAINLEVEL_OUTOF10: 5
PAINLEVEL_OUTOF10: 1

## 2025-08-22 ASSESSMENT — PAIN DESCRIPTION - ORIENTATION
ORIENTATION: RIGHT;LEFT
ORIENTATION: RIGHT;LEFT

## 2025-08-22 ASSESSMENT — PAIN DESCRIPTION - FREQUENCY: FREQUENCY: INTERMITTENT

## 2025-08-22 ASSESSMENT — PAIN DESCRIPTION - PAIN TYPE: TYPE: CHRONIC PAIN

## 2025-08-22 ASSESSMENT — PAIN DESCRIPTION - LOCATION
LOCATION: LEG
LOCATION: LEG

## 2025-08-22 ASSESSMENT — PAIN SCALES - WONG BAKER
WONGBAKER_NUMERICALRESPONSE: HURTS EVEN MORE
WONGBAKER_NUMERICALRESPONSE: HURTS A LITTLE BIT

## 2025-08-22 ASSESSMENT — PAIN DESCRIPTION - DESCRIPTORS: DESCRIPTORS: ACHING;DISCOMFORT;SORE

## 2025-08-23 ENCOUNTER — APPOINTMENT (OUTPATIENT)
Dept: ULTRASOUND IMAGING | Age: 75
DRG: 812 | End: 2025-08-23
Payer: MEDICARE

## 2025-08-23 PROBLEM — R91.1 PULMONARY NODULE: Status: ACTIVE | Noted: 2025-08-23

## 2025-08-23 LAB
ANION GAP SERPL CALCULATED.3IONS-SCNC: 13 MMOL/L (ref 7–16)
BUN SERPL-MCNC: 7 MG/DL (ref 8–23)
CALCIUM SERPL-MCNC: 8.6 MG/DL (ref 8.8–10.2)
CHLORIDE SERPL-SCNC: 103 MMOL/L (ref 98–107)
CO2 SERPL-SCNC: 23 MMOL/L (ref 22–29)
CREAT SERPL-MCNC: 0.7 MG/DL (ref 0.7–1.2)
ERYTHROCYTE [DISTWIDTH] IN BLOOD BY AUTOMATED COUNT: 32.5 % (ref 11.5–15)
GFR, ESTIMATED: >90 ML/MIN/1.73M2
GLUCOSE SERPL-MCNC: 80 MG/DL (ref 74–99)
HCT VFR BLD AUTO: 31 % (ref 37–54)
HGB BLD-MCNC: 8.6 G/DL (ref 12.5–16.5)
MCH RBC QN AUTO: 20.1 PG (ref 26–35)
MCHC RBC AUTO-ENTMCNC: 27.7 G/DL (ref 32–34.5)
MCV RBC AUTO: 72.6 FL (ref 80–99.9)
PLATELET, FLUORESCENCE: 249 K/UL (ref 130–450)
PMV BLD AUTO: ABNORMAL FL (ref 7–12)
POTASSIUM SERPL-SCNC: 3.7 MMOL/L (ref 3.5–5.1)
RBC # BLD AUTO: 4.27 M/UL (ref 3.8–5.8)
SODIUM SERPL-SCNC: 139 MMOL/L (ref 136–145)
WBC OTHER # BLD: 9 K/UL (ref 4.5–11.5)

## 2025-08-23 PROCEDURE — 85027 COMPLETE CBC AUTOMATED: CPT

## 2025-08-23 PROCEDURE — 6360000002 HC RX W HCPCS: Performed by: SURGERY

## 2025-08-23 PROCEDURE — 1200000000 HC SEMI PRIVATE

## 2025-08-23 PROCEDURE — 99232 SBSQ HOSP IP/OBS MODERATE 35: CPT | Performed by: INTERNAL MEDICINE

## 2025-08-23 PROCEDURE — 6370000000 HC RX 637 (ALT 250 FOR IP): Performed by: SURGERY

## 2025-08-23 PROCEDURE — 2500000003 HC RX 250 WO HCPCS: Performed by: SURGERY

## 2025-08-23 PROCEDURE — 80048 BASIC METABOLIC PNL TOTAL CA: CPT

## 2025-08-23 PROCEDURE — 94640 AIRWAY INHALATION TREATMENT: CPT

## 2025-08-23 PROCEDURE — 93971 EXTREMITY STUDY: CPT

## 2025-08-23 RX ORDER — FERROUS SULFATE 325(65) MG
325 TABLET ORAL 2 TIMES DAILY
Qty: 180 TABLET | Refills: 1 | Status: SHIPPED | OUTPATIENT
Start: 2025-08-23

## 2025-08-23 RX ORDER — VENLAFAXINE HYDROCHLORIDE 150 MG/1
150 CAPSULE, EXTENDED RELEASE ORAL DAILY
Qty: 30 CAPSULE | Refills: 1
Start: 2025-08-24

## 2025-08-23 RX ORDER — PANTOPRAZOLE SODIUM 40 MG/1
40 TABLET, DELAYED RELEASE ORAL
Qty: 180 TABLET | Refills: 1 | Status: SHIPPED | OUTPATIENT
Start: 2025-08-23

## 2025-08-23 RX ADMIN — ARIPIPRAZOLE 10 MG: 10 TABLET ORAL at 19:03

## 2025-08-23 RX ADMIN — IPRATROPIUM BROMIDE 0.5 MG: 0.5 SOLUTION RESPIRATORY (INHALATION) at 13:35

## 2025-08-23 RX ADMIN — SODIUM CHLORIDE, PRESERVATIVE FREE 40 MG: 5 INJECTION INTRAVENOUS at 06:03

## 2025-08-23 RX ADMIN — AMLODIPINE BESYLATE 10 MG: 10 TABLET ORAL at 21:28

## 2025-08-23 RX ADMIN — OXYCODONE HYDROCHLORIDE 5 MG: 5 TABLET ORAL at 10:28

## 2025-08-23 RX ADMIN — BUDESONIDE 500 MCG: 0.5 SUSPENSION RESPIRATORY (INHALATION) at 09:03

## 2025-08-23 RX ADMIN — VENLAFAXINE HYDROCHLORIDE 150 MG: 150 CAPSULE, EXTENDED RELEASE ORAL at 10:28

## 2025-08-23 RX ADMIN — TAMSULOSIN HYDROCHLORIDE 0.4 MG: 0.4 CAPSULE ORAL at 21:28

## 2025-08-23 RX ADMIN — BUDESONIDE 500 MCG: 0.5 SUSPENSION RESPIRATORY (INHALATION) at 21:32

## 2025-08-23 RX ADMIN — SODIUM CHLORIDE, PRESERVATIVE FREE 10 ML: 5 INJECTION INTRAVENOUS at 21:31

## 2025-08-23 RX ADMIN — ATORVASTATIN CALCIUM 40 MG: 40 TABLET, FILM COATED ORAL at 10:28

## 2025-08-23 RX ADMIN — IPRATROPIUM BROMIDE 0.5 MG: 0.5 SOLUTION RESPIRATORY (INHALATION) at 09:03

## 2025-08-23 RX ADMIN — OXYCODONE HYDROCHLORIDE 5 MG: 5 TABLET ORAL at 21:28

## 2025-08-23 RX ADMIN — IPRATROPIUM BROMIDE 0.5 MG: 0.5 SOLUTION RESPIRATORY (INHALATION) at 21:32

## 2025-08-23 RX ADMIN — ARFORMOTEROL TARTRATE 15 MCG: 15 SOLUTION RESPIRATORY (INHALATION) at 09:03

## 2025-08-23 RX ADMIN — SODIUM CHLORIDE, PRESERVATIVE FREE 40 MG: 5 INJECTION INTRAVENOUS at 19:03

## 2025-08-23 RX ADMIN — TRAZODONE HYDROCHLORIDE 200 MG: 50 TABLET ORAL at 21:28

## 2025-08-23 RX ADMIN — ARFORMOTEROL TARTRATE 15 MCG: 15 SOLUTION RESPIRATORY (INHALATION) at 21:32

## 2025-08-23 ASSESSMENT — PAIN DESCRIPTION - LOCATION
LOCATION: ARM
LOCATION: LEG

## 2025-08-23 ASSESSMENT — PAIN DESCRIPTION - ORIENTATION: ORIENTATION: LEFT

## 2025-08-23 ASSESSMENT — PAIN SCALES - GENERAL
PAINLEVEL_OUTOF10: 9
PAINLEVEL_OUTOF10: 6

## 2025-08-23 ASSESSMENT — PAIN DESCRIPTION - DESCRIPTORS: DESCRIPTORS: ACHING;DISCOMFORT;TENDER

## 2025-08-24 VITALS
HEIGHT: 72 IN | SYSTOLIC BLOOD PRESSURE: 103 MMHG | WEIGHT: 175.4 LBS | TEMPERATURE: 98.1 F | RESPIRATION RATE: 20 BRPM | BODY MASS INDEX: 23.76 KG/M2 | OXYGEN SATURATION: 94 % | HEART RATE: 57 BPM | DIASTOLIC BLOOD PRESSURE: 61 MMHG

## 2025-08-24 PROCEDURE — 6370000000 HC RX 637 (ALT 250 FOR IP): Performed by: SURGERY

## 2025-08-24 PROCEDURE — 6360000002 HC RX W HCPCS

## 2025-08-24 PROCEDURE — 2500000003 HC RX 250 WO HCPCS

## 2025-08-24 PROCEDURE — 94640 AIRWAY INHALATION TREATMENT: CPT

## 2025-08-24 PROCEDURE — 99232 SBSQ HOSP IP/OBS MODERATE 35: CPT | Performed by: INTERNAL MEDICINE

## 2025-08-24 PROCEDURE — 6360000002 HC RX W HCPCS: Performed by: SURGERY

## 2025-08-24 PROCEDURE — 2500000003 HC RX 250 WO HCPCS: Performed by: SURGERY

## 2025-08-24 RX ORDER — DOXYCYCLINE HYCLATE 100 MG
100 TABLET ORAL 2 TIMES DAILY
Qty: 14 TABLET | Refills: 0 | Status: SHIPPED | OUTPATIENT
Start: 2025-08-24 | End: 2025-08-31

## 2025-08-24 RX ADMIN — IPRATROPIUM BROMIDE 0.5 MG: 0.5 SOLUTION RESPIRATORY (INHALATION) at 08:07

## 2025-08-24 RX ADMIN — OXYCODONE HYDROCHLORIDE 5 MG: 5 TABLET ORAL at 11:08

## 2025-08-24 RX ADMIN — ARFORMOTEROL TARTRATE 15 MCG: 15 SOLUTION RESPIRATORY (INHALATION) at 08:07

## 2025-08-24 RX ADMIN — SODIUM CHLORIDE, PRESERVATIVE FREE 10 ML: 5 INJECTION INTRAVENOUS at 06:37

## 2025-08-24 RX ADMIN — OXYCODONE HYDROCHLORIDE 5 MG: 5 TABLET ORAL at 02:22

## 2025-08-24 RX ADMIN — WATER 1000 MG: 1 INJECTION INTRAMUSCULAR; INTRAVENOUS; SUBCUTANEOUS at 06:43

## 2025-08-24 RX ADMIN — IPRATROPIUM BROMIDE 0.5 MG: 0.5 SOLUTION RESPIRATORY (INHALATION) at 12:33

## 2025-08-24 RX ADMIN — BUDESONIDE 500 MCG: 0.5 SUSPENSION RESPIRATORY (INHALATION) at 08:06

## 2025-08-24 RX ADMIN — OXYCODONE HYDROCHLORIDE 5 MG: 5 TABLET ORAL at 06:38

## 2025-08-24 RX ADMIN — WATER 1000 MG: 1 INJECTION INTRAMUSCULAR; INTRAVENOUS; SUBCUTANEOUS at 14:04

## 2025-08-24 RX ADMIN — ATORVASTATIN CALCIUM 40 MG: 40 TABLET, FILM COATED ORAL at 11:08

## 2025-08-24 RX ADMIN — SODIUM CHLORIDE, PRESERVATIVE FREE 40 MG: 5 INJECTION INTRAVENOUS at 06:39

## 2025-08-24 RX ADMIN — VENLAFAXINE HYDROCHLORIDE 150 MG: 150 CAPSULE, EXTENDED RELEASE ORAL at 11:09

## 2025-08-24 ASSESSMENT — PAIN SCALES - GENERAL
PAINLEVEL_OUTOF10: 7
PAINLEVEL_OUTOF10: 9
PAINLEVEL_OUTOF10: 6
PAINLEVEL_OUTOF10: 0

## 2025-08-24 ASSESSMENT — PAIN DESCRIPTION - LOCATION
LOCATION: ARM
LOCATION: LEG
LOCATION: ARM

## 2025-08-24 ASSESSMENT — PAIN - FUNCTIONAL ASSESSMENT
PAIN_FUNCTIONAL_ASSESSMENT: 0-10
PAIN_FUNCTIONAL_ASSESSMENT: 0-10
PAIN_FUNCTIONAL_ASSESSMENT: PREVENTS OR INTERFERES SOME ACTIVE ACTIVITIES AND ADLS
PAIN_FUNCTIONAL_ASSESSMENT: 0-10
PAIN_FUNCTIONAL_ASSESSMENT: 0-10

## 2025-08-24 ASSESSMENT — PAIN DESCRIPTION - DESCRIPTORS
DESCRIPTORS: ACHING;DISCOMFORT;TENDER
DESCRIPTORS: ACHING;DISCOMFORT

## 2025-08-24 ASSESSMENT — PAIN DESCRIPTION - ORIENTATION
ORIENTATION: LEFT
ORIENTATION: LEFT

## 2025-08-25 ENCOUNTER — TELEPHONE (OUTPATIENT)
Dept: FAMILY MEDICINE CLINIC | Age: 75
End: 2025-08-25

## 2025-08-25 DIAGNOSIS — J44.9 CHRONIC OBSTRUCTIVE PULMONARY DISEASE, UNSPECIFIED COPD TYPE (HCC): Primary | ICD-10-CM

## 2025-08-25 LAB — SURGICAL PATHOLOGY REPORT: NORMAL

## 2025-08-26 ENCOUNTER — OFFICE VISIT (OUTPATIENT)
Dept: FAMILY MEDICINE CLINIC | Age: 75
End: 2025-08-26

## 2025-08-26 VITALS
BODY MASS INDEX: 23.7 KG/M2 | HEART RATE: 70 BPM | HEIGHT: 72 IN | WEIGHT: 175 LBS | SYSTOLIC BLOOD PRESSURE: 99 MMHG | OXYGEN SATURATION: 95 % | DIASTOLIC BLOOD PRESSURE: 66 MMHG | RESPIRATION RATE: 18 BRPM | TEMPERATURE: 97.6 F

## 2025-08-26 DIAGNOSIS — D50.0 IRON DEFICIENCY ANEMIA DUE TO CHRONIC BLOOD LOSS: ICD-10-CM

## 2025-08-26 DIAGNOSIS — I95.9 HYPOTENSION, UNSPECIFIED HYPOTENSION TYPE: ICD-10-CM

## 2025-08-26 DIAGNOSIS — Z09 HOSPITAL DISCHARGE FOLLOW-UP: Primary | ICD-10-CM

## 2025-08-26 DIAGNOSIS — R93.2 ABNORMAL FINDING ON IMAGING OF LIVER: ICD-10-CM

## 2025-08-26 DIAGNOSIS — R91.8 LUNG MASS: ICD-10-CM

## 2025-08-26 LAB — SURGICAL PATHOLOGY REPORT: NORMAL

## 2025-08-28 ENCOUNTER — TELEPHONE (OUTPATIENT)
Dept: FAMILY MEDICINE CLINIC | Age: 75
End: 2025-08-28

## 2025-08-28 DIAGNOSIS — M79.604 PAIN IN BOTH LOWER EXTREMITIES: Primary | ICD-10-CM

## 2025-08-28 DIAGNOSIS — M79.605 PAIN IN BOTH LOWER EXTREMITIES: Primary | ICD-10-CM

## 2025-08-28 RX ORDER — MELOXICAM 15 MG/1
15 TABLET ORAL NIGHTLY PRN
Qty: 30 TABLET | Refills: 0 | Status: SHIPPED | OUTPATIENT
Start: 2025-08-28

## 2025-08-30 LAB — PTH RELATED PEPTIDE: <2 PMOL/L (ref 0–2.3)

## (undated) DEVICE — ELECTRODE PT RET AD L9FT HI MOIST COND ADH HYDRGEL CORDED

## (undated) DEVICE — GLOVE SURG L 291 MM CUF THK 0.17 MM FNGR THK 0.2 MM PALM THK

## (undated) DEVICE — BLOCK BITE 60FR RUBBER ADLT DENTAL

## (undated) DEVICE — GRADUATE TRIANG MEASURE 1000ML BLK PRNT

## (undated) DEVICE — SNARE POLYP SM W13MMXL240CM SHTH DIA2.4MM OVL FLX DISP

## (undated) DEVICE — SPONGE GZ W4XL4IN RAYON POLY CVR W/NONWOVEN FAB STRL 2/PK

## (undated) DEVICE — TRAP SPEC POLYP REM STRNR CLN DSGN MAGNIFYING WIND DISP

## (undated) DEVICE — SNARE ENDOSCP L240CM LOOP W13MM SHTH DIA2.4MM SM OVL FLX

## (undated) DEVICE — FORCEPS BX OVL CUP FEN DISPOSABLE CAP L 160CM RAD JAW 4